# Patient Record
Sex: FEMALE | Race: WHITE | NOT HISPANIC OR LATINO | Employment: FULL TIME | URBAN - METROPOLITAN AREA
[De-identification: names, ages, dates, MRNs, and addresses within clinical notes are randomized per-mention and may not be internally consistent; named-entity substitution may affect disease eponyms.]

---

## 2023-05-30 PROBLEM — E66.811 CLASS 1 OBESITY WITH SERIOUS COMORBIDITY AND BODY MASS INDEX (BMI) OF 31.0 TO 31.9 IN ADULT: Status: ACTIVE | Noted: 2023-05-30

## 2024-08-06 ENCOUNTER — LAB VISIT (OUTPATIENT)
Dept: LAB | Facility: HOSPITAL | Age: 64
End: 2024-08-06
Attending: STUDENT IN AN ORGANIZED HEALTH CARE EDUCATION/TRAINING PROGRAM
Payer: COMMERCIAL

## 2024-08-06 ENCOUNTER — OFFICE VISIT (OUTPATIENT)
Dept: FAMILY MEDICINE | Facility: CLINIC | Age: 64
End: 2024-08-06
Payer: COMMERCIAL

## 2024-08-06 VITALS
TEMPERATURE: 97 F | WEIGHT: 209.19 LBS | OXYGEN SATURATION: 98 % | HEIGHT: 68 IN | SYSTOLIC BLOOD PRESSURE: 110 MMHG | BODY MASS INDEX: 31.71 KG/M2 | HEART RATE: 80 BPM | DIASTOLIC BLOOD PRESSURE: 72 MMHG

## 2024-08-06 DIAGNOSIS — F51.04 PSYCHOPHYSIOLOGICAL INSOMNIA: ICD-10-CM

## 2024-08-06 DIAGNOSIS — Z12.4 CERVICAL CANCER SCREENING: ICD-10-CM

## 2024-08-06 DIAGNOSIS — R07.89 ATYPICAL CHEST PAIN: ICD-10-CM

## 2024-08-06 DIAGNOSIS — Z00.00 WELLNESS EXAMINATION: Primary | ICD-10-CM

## 2024-08-06 DIAGNOSIS — M62.838 MUSCLE SPASM: ICD-10-CM

## 2024-08-06 DIAGNOSIS — I10 PRIMARY HYPERTENSION: ICD-10-CM

## 2024-08-06 DIAGNOSIS — Z00.00 WELLNESS EXAMINATION: ICD-10-CM

## 2024-08-06 DIAGNOSIS — E66.09 CLASS 1 OBESITY DUE TO EXCESS CALORIES WITH SERIOUS COMORBIDITY AND BODY MASS INDEX (BMI) OF 31.0 TO 31.9 IN ADULT: ICD-10-CM

## 2024-08-06 PROBLEM — R94.39 CARDIOVASCULAR STRESS TEST ABNORMAL: Status: RESOLVED | Noted: 2023-05-30 | Resolved: 2024-08-06

## 2024-08-06 LAB
ALBUMIN SERPL BCP-MCNC: 4.2 G/DL (ref 3.5–5.2)
ALP SERPL-CCNC: 70 U/L (ref 55–135)
ALT SERPL W/O P-5'-P-CCNC: 43 U/L (ref 10–44)
ANION GAP SERPL CALC-SCNC: 7 MMOL/L (ref 8–16)
AST SERPL-CCNC: 26 U/L (ref 10–40)
BASOPHILS # BLD AUTO: 0.03 K/UL (ref 0–0.2)
BASOPHILS NFR BLD: 0.6 % (ref 0–1.9)
BILIRUB SERPL-MCNC: 0.6 MG/DL (ref 0.1–1)
BUN SERPL-MCNC: 22 MG/DL (ref 8–23)
CALCIUM SERPL-MCNC: 10 MG/DL (ref 8.7–10.5)
CHLORIDE SERPL-SCNC: 104 MMOL/L (ref 95–110)
CHOLEST SERPL-MCNC: 188 MG/DL (ref 120–199)
CHOLEST/HDLC SERPL: 3.4 {RATIO} (ref 2–5)
CO2 SERPL-SCNC: 27 MMOL/L (ref 23–29)
CREAT SERPL-MCNC: 0.8 MG/DL (ref 0.5–1.4)
DIFFERENTIAL METHOD BLD: ABNORMAL
EOSINOPHIL # BLD AUTO: 0.1 K/UL (ref 0–0.5)
EOSINOPHIL NFR BLD: 2.1 % (ref 0–8)
ERYTHROCYTE [DISTWIDTH] IN BLOOD BY AUTOMATED COUNT: 13.4 % (ref 11.5–14.5)
EST. GFR  (NO RACE VARIABLE): >60 ML/MIN/1.73 M^2
ESTIMATED AVG GLUCOSE: 105 MG/DL (ref 68–131)
GLUCOSE SERPL-MCNC: 94 MG/DL (ref 70–110)
HBA1C MFR BLD: 5.3 % (ref 4–5.6)
HCT VFR BLD AUTO: 45.4 % (ref 37–48.5)
HCV AB SERPL QL IA: NORMAL
HDLC SERPL-MCNC: 56 MG/DL (ref 40–75)
HDLC SERPL: 29.8 % (ref 20–50)
HGB BLD-MCNC: 14.4 G/DL (ref 12–16)
HIV 1+2 AB+HIV1 P24 AG SERPL QL IA: NORMAL
IMM GRANULOCYTES # BLD AUTO: 0.01 K/UL (ref 0–0.04)
IMM GRANULOCYTES NFR BLD AUTO: 0.2 % (ref 0–0.5)
LDLC SERPL CALC-MCNC: 112.2 MG/DL (ref 63–159)
LYMPHOCYTES # BLD AUTO: 2.1 K/UL (ref 1–4.8)
LYMPHOCYTES NFR BLD: 40.2 % (ref 18–48)
MCH RBC QN AUTO: 29.8 PG (ref 27–31)
MCHC RBC AUTO-ENTMCNC: 31.7 G/DL (ref 32–36)
MCV RBC AUTO: 94 FL (ref 82–98)
MONOCYTES # BLD AUTO: 0.5 K/UL (ref 0.3–1)
MONOCYTES NFR BLD: 10.2 % (ref 4–15)
NEUTROPHILS # BLD AUTO: 2.5 K/UL (ref 1.8–7.7)
NEUTROPHILS NFR BLD: 46.7 % (ref 38–73)
NONHDLC SERPL-MCNC: 132 MG/DL
NRBC BLD-RTO: 0 /100 WBC
PLATELET # BLD AUTO: 274 K/UL (ref 150–450)
PMV BLD AUTO: 9.4 FL (ref 9.2–12.9)
POTASSIUM SERPL-SCNC: 3.9 MMOL/L (ref 3.5–5.1)
PROT SERPL-MCNC: 7.5 G/DL (ref 6–8.4)
RBC # BLD AUTO: 4.83 M/UL (ref 4–5.4)
SODIUM SERPL-SCNC: 138 MMOL/L (ref 136–145)
TRIGL SERPL-MCNC: 99 MG/DL (ref 30–150)
TSH SERPL DL<=0.005 MIU/L-ACNC: 2.85 UIU/ML (ref 0.4–4)
WBC # BLD AUTO: 5.3 K/UL (ref 3.9–12.7)

## 2024-08-06 PROCEDURE — 85025 COMPLETE CBC W/AUTO DIFF WBC: CPT | Performed by: STUDENT IN AN ORGANIZED HEALTH CARE EDUCATION/TRAINING PROGRAM

## 2024-08-06 PROCEDURE — 1160F RVW MEDS BY RX/DR IN RCRD: CPT | Mod: CPTII,S$GLB,, | Performed by: STUDENT IN AN ORGANIZED HEALTH CARE EDUCATION/TRAINING PROGRAM

## 2024-08-06 PROCEDURE — 86803 HEPATITIS C AB TEST: CPT | Performed by: STUDENT IN AN ORGANIZED HEALTH CARE EDUCATION/TRAINING PROGRAM

## 2024-08-06 PROCEDURE — 80053 COMPREHEN METABOLIC PANEL: CPT | Performed by: STUDENT IN AN ORGANIZED HEALTH CARE EDUCATION/TRAINING PROGRAM

## 2024-08-06 PROCEDURE — 83036 HEMOGLOBIN GLYCOSYLATED A1C: CPT | Performed by: STUDENT IN AN ORGANIZED HEALTH CARE EDUCATION/TRAINING PROGRAM

## 2024-08-06 PROCEDURE — 1159F MED LIST DOCD IN RCRD: CPT | Mod: CPTII,S$GLB,, | Performed by: STUDENT IN AN ORGANIZED HEALTH CARE EDUCATION/TRAINING PROGRAM

## 2024-08-06 PROCEDURE — 3074F SYST BP LT 130 MM HG: CPT | Mod: CPTII,S$GLB,, | Performed by: STUDENT IN AN ORGANIZED HEALTH CARE EDUCATION/TRAINING PROGRAM

## 2024-08-06 PROCEDURE — 3008F BODY MASS INDEX DOCD: CPT | Mod: CPTII,S$GLB,, | Performed by: STUDENT IN AN ORGANIZED HEALTH CARE EDUCATION/TRAINING PROGRAM

## 2024-08-06 PROCEDURE — 99396 PREV VISIT EST AGE 40-64: CPT | Mod: S$GLB,,, | Performed by: STUDENT IN AN ORGANIZED HEALTH CARE EDUCATION/TRAINING PROGRAM

## 2024-08-06 PROCEDURE — 87624 HPV HI-RISK TYP POOLED RSLT: CPT | Performed by: STUDENT IN AN ORGANIZED HEALTH CARE EDUCATION/TRAINING PROGRAM

## 2024-08-06 PROCEDURE — 36415 COLL VENOUS BLD VENIPUNCTURE: CPT | Mod: PO | Performed by: STUDENT IN AN ORGANIZED HEALTH CARE EDUCATION/TRAINING PROGRAM

## 2024-08-06 PROCEDURE — 3078F DIAST BP <80 MM HG: CPT | Mod: CPTII,S$GLB,, | Performed by: STUDENT IN AN ORGANIZED HEALTH CARE EDUCATION/TRAINING PROGRAM

## 2024-08-06 PROCEDURE — 84443 ASSAY THYROID STIM HORMONE: CPT | Performed by: STUDENT IN AN ORGANIZED HEALTH CARE EDUCATION/TRAINING PROGRAM

## 2024-08-06 PROCEDURE — 80061 LIPID PANEL: CPT | Performed by: STUDENT IN AN ORGANIZED HEALTH CARE EDUCATION/TRAINING PROGRAM

## 2024-08-06 PROCEDURE — 87389 HIV-1 AG W/HIV-1&-2 AB AG IA: CPT | Performed by: STUDENT IN AN ORGANIZED HEALTH CARE EDUCATION/TRAINING PROGRAM

## 2024-08-06 PROCEDURE — 88175 CYTOPATH C/V AUTO FLUID REDO: CPT | Performed by: STUDENT IN AN ORGANIZED HEALTH CARE EDUCATION/TRAINING PROGRAM

## 2024-08-06 PROCEDURE — 99999 PR PBB SHADOW E&M-EST. PATIENT-LVL IV: CPT | Mod: PBBFAC,,, | Performed by: STUDENT IN AN ORGANIZED HEALTH CARE EDUCATION/TRAINING PROGRAM

## 2024-08-06 RX ORDER — TRAZODONE HYDROCHLORIDE 50 MG/1
50 TABLET ORAL NIGHTLY
Qty: 30 TABLET | Refills: 5 | Status: SHIPPED | OUTPATIENT
Start: 2024-08-06 | End: 2024-08-06 | Stop reason: SDUPTHER

## 2024-08-06 RX ORDER — CYCLOBENZAPRINE HCL 5 MG
5 TABLET ORAL 3 TIMES DAILY PRN
Qty: 60 TABLET | Refills: 2 | Status: SHIPPED | OUTPATIENT
Start: 2024-08-06

## 2024-08-06 RX ORDER — CYCLOBENZAPRINE HCL 5 MG
5 TABLET ORAL 3 TIMES DAILY PRN
Qty: 60 TABLET | Refills: 2 | Status: SHIPPED | OUTPATIENT
Start: 2024-08-06 | End: 2024-08-06 | Stop reason: SDUPTHER

## 2024-08-06 RX ORDER — TRAZODONE HYDROCHLORIDE 50 MG/1
50 TABLET ORAL NIGHTLY
Qty: 30 TABLET | Refills: 5 | Status: SHIPPED | OUTPATIENT
Start: 2024-08-06

## 2024-08-09 LAB
FINAL PATHOLOGIC DIAGNOSIS: NORMAL
Lab: NORMAL

## 2024-08-09 PROCEDURE — 82274 ASSAY TEST FOR BLOOD FECAL: CPT | Performed by: STUDENT IN AN ORGANIZED HEALTH CARE EDUCATION/TRAINING PROGRAM

## 2024-08-13 ENCOUNTER — TELEPHONE (OUTPATIENT)
Dept: FAMILY MEDICINE | Facility: CLINIC | Age: 64
End: 2024-08-13
Payer: COMMERCIAL

## 2024-08-13 DIAGNOSIS — R19.5 POSITIVE FECAL OCCULT BLOOD TEST: Primary | ICD-10-CM

## 2024-08-16 ENCOUNTER — HOSPITAL ENCOUNTER (OUTPATIENT)
Dept: PREADMISSION TESTING | Facility: HOSPITAL | Age: 64
Discharge: HOME | End: 2024-08-16
Attending: INTERNAL MEDICINE
Payer: COMMERCIAL

## 2024-08-16 DIAGNOSIS — R19.5 POSITIVE FECAL OCCULT BLOOD TEST: Primary | ICD-10-CM

## 2024-08-16 DIAGNOSIS — R19.5 POSITIVE FECAL OCCULT BLOOD TEST: ICD-10-CM

## 2024-08-22 ENCOUNTER — HOSPITAL ENCOUNTER (OUTPATIENT)
Facility: HOSPITAL | Age: 64
Discharge: HOME | End: 2024-08-22
Attending: INTERNAL MEDICINE | Admitting: INTERNAL MEDICINE
Payer: COMMERCIAL

## 2024-08-22 ENCOUNTER — ANESTHESIA EVENT (OUTPATIENT)
Dept: ENDOSCOPY | Facility: HOSPITAL | Age: 64
End: 2024-08-22
Payer: COMMERCIAL

## 2024-08-22 ENCOUNTER — ANESTHESIA (OUTPATIENT)
Dept: ENDOSCOPY | Facility: HOSPITAL | Age: 64
End: 2024-08-22
Payer: COMMERCIAL

## 2024-08-22 DIAGNOSIS — R19.5 POSITIVE FIT (FECAL IMMUNOCHEMICAL TEST): ICD-10-CM

## 2024-08-22 PROBLEM — K64.8 INTERNAL HEMORRHOIDS: Status: ACTIVE | Noted: 2024-08-22

## 2024-08-22 PROCEDURE — 63600175 PHARM REV CODE 636 W HCPCS: Performed by: FAMILY MEDICINE

## 2024-08-22 PROCEDURE — 37000008 HC ANESTHESIA 1ST 15 MINUTES: Performed by: INTERNAL MEDICINE

## 2024-08-22 PROCEDURE — 37000009 HC ANESTHESIA EA ADD 15 MINS: Performed by: INTERNAL MEDICINE

## 2024-08-22 PROCEDURE — G0121 COLON CA SCRN NOT HI RSK IND: HCPCS | Mod: KX | Performed by: INTERNAL MEDICINE

## 2024-08-22 PROCEDURE — 25000003 PHARM REV CODE 250: Performed by: FAMILY MEDICINE

## 2024-08-22 PROCEDURE — G0121 COLON CA SCRN NOT HI RSK IND: HCPCS | Mod: KX,,, | Performed by: INTERNAL MEDICINE

## 2024-08-22 RX ORDER — LIDOCAINE HYDROCHLORIDE 20 MG/ML
INJECTION, SOLUTION EPIDURAL; INFILTRATION; INTRACAUDAL; PERINEURAL
Status: DISCONTINUED | OUTPATIENT
Start: 2024-08-22 | End: 2024-08-22

## 2024-08-22 RX ORDER — PROPOFOL 10 MG/ML
VIAL (ML) INTRAVENOUS
Status: DISCONTINUED | OUTPATIENT
Start: 2024-08-22 | End: 2024-08-22

## 2024-08-22 RX ADMIN — PROPOFOL 100 MG: 10 INJECTION, EMULSION INTRAVENOUS at 01:08

## 2024-08-22 RX ADMIN — SODIUM CHLORIDE, SODIUM LACTATE, POTASSIUM CHLORIDE, AND CALCIUM CHLORIDE: .6; .31; .03; .02 INJECTION, SOLUTION INTRAVENOUS at 01:08

## 2024-08-22 RX ADMIN — LIDOCAINE HYDROCHLORIDE 50 MG: 20 INJECTION, SOLUTION EPIDURAL; INFILTRATION; INTRACAUDAL; PERINEURAL at 01:08

## 2024-08-22 RX ADMIN — PROPOFOL 50 MG: 10 INJECTION, EMULSION INTRAVENOUS at 01:08

## 2024-08-22 NOTE — ANESTHESIA PREPROCEDURE EVALUATION
08/22/2024  Chel Thompson is a 63 y.o., female.      Pre-op Assessment    I have reviewed the Patient Summary Reports.     I have reviewed the Nursing Notes. I have reviewed the NPO Status.   I have reviewed the Medications.     Review of Systems  Anesthesia Hx:  No problems with previous Anesthesia                Hematology/Oncology:  Hematology Normal   Oncology Normal                                   EENT/Dental:  EENT/Dental Normal           Cardiovascular:     Hypertension                                        Pulmonary:  Pulmonary Normal                       Renal/:  Renal/ Normal                 Hepatic/GI:  Hepatic/GI Normal                 Musculoskeletal:  Musculoskeletal Normal                Neurological:    Neuromuscular Disease,                                   Endocrine:  Endocrine Normal          Obesity / BMI > 30  Dermatological:  Skin Normal    Psych:  Psychiatric Normal                    Physical Exam  General: Well nourished, Cooperative, Alert and Oriented    Airway:  Mallampati: II   Mouth Opening: Normal  TM Distance: Normal  Tongue: Normal  Neck ROM: Normal ROM    Dental:  Intact    Chest/Lungs:  Clear to auscultation    Heart:  Rhythm: Regular Rhythm  Sounds: Normal    Abdomen:  Normal        Anesthesia Plan  Type of Anesthesia, risks & benefits discussed:    Anesthesia Type: MAC  Intra-op Monitoring Plan: Standard ASA Monitors  Induction:  IV  Informed Consent: Informed consent signed with the Patient and all parties understand the risks and agree with anesthesia plan.  All questions answered.   ASA Score: 2  Day of Surgery Review of History & Physical: I have interviewed and examined the patient. I have reviewed the patient's H&P dated:     Ready For Surgery From Anesthesia Perspective.     .

## 2024-08-22 NOTE — PLAN OF CARE
Dr. Garcia at  to discuss findings. VSS. No  Pain, no GI bleeding. Pt to be discharged from unit.

## 2024-08-22 NOTE — H&P
PRE PROCEDURE H&P    Patient Name: Chel Thompson  MRN: [id number]  : 1960  Date of Procedure:  2024  Referring Physician: Yesica Burk MD  Primary Physician: Yesica Burk MD  Procedure Physician: Kya Garcia MD       Planned Procedure: Colonoscopy  Diagnosis: positive FIT      Chief Complaint: Same as above    HPI: Patient is an 63 y.o. female is here for the above. No previous colonoscopy. Referred for positive FIT. No ovet GI bleeding. No Fhx of CRC, no blood thinners.     Last colonoscopy: none  Family history: none  Anticoagulation: none    Past Medical History:   Past Medical History:   Diagnosis Date    Hypertension     Hypertensive heart disease without heart failure 2021    Pancreatitis     4 times (,)    UTI (urinary tract infection)         Past Surgical History:  Past Surgical History:   Procedure Laterality Date    DILATION AND CURETTAGE OF UTERUS      MULTIPLE TOOTH EXTRACTIONS      vaginal delivery      varicose veins          Home Medications:  Prior to Admission medications    Medication Sig Start Date End Date Taking? Authorizing Provider   cyclobenzaprine (FLEXERIL) 5 MG tablet Take 1 tablet (5 mg total) by mouth 3 (three) times daily as needed for Muscle spasms. 24  Yes Yesica Burk MD   traZODone (DESYREL) 50 MG tablet Take 1 tablet (50 mg total) by mouth every evening. 24  Yes Yesica Burk MD   valsartan-hydrochlorothiazide (DIOVAN-HCT) 160-12.5 mg per tablet Take 1 tablet by mouth every morning. 23  Yes Provider, Historical        Allergies:  Review of patient's allergies indicates:   Allergen Reactions    Dilaudid [hydromorphone] Itching     From IV    Nitrofurantoin Other (See Comments)     Cramping of legs        Social History:   Social History     Socioeconomic History    Marital status:      Spouse name: Shen Thompson    Number of children: 1   Tobacco Use    Smoking status: Never    Smokeless tobacco: Never   Substance and  "Sexual Activity    Alcohol use: Yes     Comment: occasionally    Drug use: No    Sexual activity: Yes     Partners: Male     Social Determinants of Health     Financial Resource Strain: Low Risk  (8/6/2024)    Overall Financial Resource Strain (CARDIA)     Difficulty of Paying Living Expenses: Not hard at all   Food Insecurity: No Food Insecurity (8/6/2024)    Hunger Vital Sign     Worried About Running Out of Food in the Last Year: Never true     Ran Out of Food in the Last Year: Never true   Transportation Needs: No Transportation Needs (5/30/2023)    PRAPARE - Transportation     Lack of Transportation (Medical): No     Lack of Transportation (Non-Medical): No   Physical Activity: Sufficiently Active (8/6/2024)    Exercise Vital Sign     Days of Exercise per Week: 5 days     Minutes of Exercise per Session: 30 min   Stress: Stress Concern Present (8/6/2024)    [place] [employer] - Occupational Stress Questionnaire     Feeling of Stress : To some extent   Housing Stability: Unknown (5/30/2023)    Housing Stability Vital Sign     Unable to Pay for Housing in the Last Year: No     Unstable Housing in the Last Year: No       Family History:  Family History   Problem Relation Name Age of Onset    Asthma Mother      Hypertension Mother      COPD Brother 3     Cancer Maternal Grandmother      Cancer Maternal Grandfather         ROS: No acute cardiac events, no acute respiratory complaints.     Physical Exam (all patients):    /89 (BP Location: Left arm, Patient Position: Lying)   Pulse 97   Temp 98.6 °F (37 °C)   Resp 19   Ht 5' 8" (1.727 m)   Wt 94.8 kg (209 lb)   LMP 05/16/2015   SpO2 96%   Breastfeeding No   BMI 31.78 kg/m²   Lungs: Clear to auscultation bilaterally, respirations unlabored  Heart: Regular rate and rhythm, S1 and S2 normal, no obvious murmurs  Abdomen:         Soft, non-tender, bowel sounds normal, no masses, no organomegaly    Lab Results   Component Value Date    " WBC 5.30 08/06/2024    MCV 94 08/06/2024    RDW 13.4 08/06/2024     08/06/2024    GLU 94 08/06/2024    HGBA1C 5.3 08/06/2024    BUN 22 08/06/2024     08/06/2024    K 3.9 08/06/2024     08/06/2024        SEDATION PLAN: per anesthesia      History reviewed, vital signs satisfactory, cardiopulmonary status satisfactory, sedation options, risks and plans have been discussed with the patient  All their questions were answered and the patient agrees to the sedation procedures as planned and the patient is deemed an appropriate candidate for the sedation as planned.    The risks, benefits and alternatives of the procedure were discussed with the patient in detail. This discussion was had in the presence of endoscopy staff. The risks include, risks of adverse reaction to sedation requiring the use of reversal agents, bleeding requiring blood transfusion, perforation requiring surgical intervention and technical failure. Other risks include aspiration leading to respiratory distress and respiratory failure resulting in endotracheal intubation and mechanical ventilation including death. If anesthesia is being utilized for this procedure, it is up to the anesthesiologist to determine airway safety including elective endotracheal intubation. Questions were answered, they agree to proceed. There was no language barriers.      Procedure explained to patient, informed consent obtained and placed in chart.    Kya Garcia  8/22/2024  1:31 PM

## 2024-08-22 NOTE — ANESTHESIA POSTPROCEDURE EVALUATION
Anesthesia Post Evaluation    Patient: Chel Thompson    Procedure(s) Performed: Procedure(s) (LRB):  COLONOSCOPY (N/A)    Final Anesthesia Type: MAC      Patient location during evaluation: GI PACU  Patient participation: Yes- Able to Participate  Level of consciousness: awake and alert  Post-procedure vital signs: reviewed and stable  Pain management: adequate  Airway patency: patent    PONV status at discharge: No PONV  Anesthetic complications: no      Cardiovascular status: stable  Respiratory status: unassisted and spontaneous ventilation  Hydration status: euvolemic  Follow-up not needed.              Vitals Value Taken Time   BP  08/22/24 1400   Temp  08/22/24 1400   Pulse  08/22/24 1400   Resp  08/22/24 1400   SpO2  08/22/24 1400         No case tracking events are documented in the log.      Pain/Meghan Score: No data recorded

## 2024-08-22 NOTE — TRANSFER OF CARE
"Anesthesia Transfer of Care Note    Patient: Chel Thompson    Procedure(s) Performed: Procedure(s) (LRB):  COLONOSCOPY (N/A)    Patient location: GI    Anesthesia Type: MAC    Transport from OR: Transported from OR on room air with adequate spontaneous ventilation    Post pain: adequate analgesia    Post assessment: no apparent anesthetic complications    Post vital signs: stable    Level of consciousness: awake and responds to stimulation    Nausea/Vomiting: no nausea/vomiting    Complications: none    Transfer of care protocol was followed      Last vitals: Visit Vitals  /89 (BP Location: Left arm, Patient Position: Lying)   Pulse 97   Temp 37 °C (98.6 °F)   Resp 19   Ht 5' 8" (1.727 m)   Wt 94.8 kg (209 lb)   LMP 05/16/2015   SpO2 96%   Breastfeeding No   BMI 31.78 kg/m²     "

## 2024-08-22 NOTE — PR[employer]ATION PATIENT INSTRUCTIONS
Discharge Summary/Instructions after an Endoscopic Procedure  Patient Name: Chel Thompson  Patient MRN: [id number]  Patient YOB: 1960 Thursday, August 22, 2024 Kya Garcia MD  Dear patient,  As a result of recent federal legislation (The Federal Cures Act), you may   receive lab or pathology results from your procedure in your [facility]   account before your physician is able to contact you. Your physician or   their representative will relay the results to you with their   recommendations at their soonest availability.  Thank you,  RESTRICTIONS:  During your procedure today, you received medications for sedation.  These   medications may affect your judgment, balance and coordination.  Therefore,   for 24 hours, you have the following restrictions:   - DO NOT drive a car, operate machinery, make legal/financial decisions,   sign important papers or drink alcohol.    ACTIVITY:  Today: no heavy lifting, straining or running due to procedural   sedation/anesthesia.  The following day: return to full activity including work.  DIET:  Eat and drink normally unless instructed otherwise.     TREATMENT FOR COMMON SIDE EFFECTS:  - Mild abdominal pain, nausea, belching, bloating or excessive gas:  rest,   eat lightly and use a heating pad.  - Sore Throat: treat with throat lozenges and/or gargle with warm salt   water.  - Because air was used during the procedure, expelling large amounts of air   from your rectum or belching is normal.  - If a bowel prep was taken, you may not have a bowel movement for 1-3 days.    This is normal.  SYMPTOMS TO WATCH FOR AND REPORT TO YOUR PHYSICIAN:  1. Abdominal pain or bloating, other than gas cramps.  2. Chest pain.  3. Back pain.  4. Signs of infection such as: chills or fever occurring within 24 hours   after the procedure.  5. Rectal bleeding, which would show as bright red, maroon, or black stools.   (A tablespoon of blood from the rectum is not serious, especially if    hemorrhoids are present.)  6. Vomiting.  7. Weakness or dizziness.  GO DIRECTLY TO THE NEAREST EMERGENCY ROOM IF YOU HAVE ANY OF THE FOLLOWING:      Difficulty breathing              Chills and/or fever over 101 F   Persistent vomiting and/or vomiting blood   Severe abdominal pain   Severe chest pain   Black, tarry stools   Bleeding- more than one tablespoon   Any other symptom or condition that you feel may need urgent attention  Your doctor recommends these additional instructions:  If any biopsies were taken, your doctors clinic will contact you in 1 to 2   weeks with any results.  - Discharge patient to home (with escort).   - Resume previous diet.   - Continue present medications.   - Repeat colonoscopy in 10 years for screening purposes.   - Return to primary care physician.  For questions, problems or results please call your physician Kya Garcia MD at Work:  [phone]  If you have any questions about the above instructions, call the GI   department at [phone] or call the endoscopy unit at [phone]   from 7am until 3 pm.  [facility] [place], EMERGENCY ROOM PHONE NUMBER:   [phone]  IF A COMPLICATION OR EMERGENCY SITUATION ARISES AND YOU ARE UNABLE TO REACH   YOUR PHYSICIAN - GO DIRECTLY TO THE EMERGENCY ROOM.  I have read or have had read to me these discharge instructions for my   procedure and have received a written copy.  I understand these   instructions and will follow-up with my physician if I have any questions.     __________________________________       _____________________________________  Nurse Signature                                          Patient/Designated   Responsible Party Signature  MD Kya Lanier MD  8/22/2024 1:59:42 PM  This report has been verified and signed electronically.  Dear patient,  As a result of recent federal legislation (The Federal Cures Act), you may   receive lab or pathology results from your  procedure in your [facility]   account before your physician is able to contact you. Your physician or   their representative will relay the results to you with their   recommendations at their soonest availability.  Thank you,  [employer]ATION

## 2024-08-23 VITALS
DIASTOLIC BLOOD PRESSURE: 65 MMHG | WEIGHT: 209 LBS | TEMPERATURE: 98 F | RESPIRATION RATE: 18 BRPM | HEIGHT: 68 IN | HEART RATE: 70 BPM | SYSTOLIC BLOOD PRESSURE: 116 MMHG | OXYGEN SATURATION: 97 % | BODY MASS INDEX: 31.67 KG/M2

## 2024-09-12 ENCOUNTER — PATIENT MESSAGE (OUTPATIENT)
Dept: FAMILY MEDICINE | Facility: CLINIC | Age: 64
End: 2024-09-12
Payer: COMMERCIAL

## 2024-09-13 ENCOUNTER — TELEPHONE (OUTPATIENT)
Dept: FAMILY MEDICINE | Facility: CLINIC | Age: 64
End: 2024-09-13
Payer: COMMERCIAL

## 2024-09-13 ENCOUNTER — OFFICE VISIT (OUTPATIENT)
Dept: URGENT CARE | Facility: CLINIC | Age: 64
End: 2024-09-13
Payer: COMMERCIAL

## 2024-09-13 VITALS
RESPIRATION RATE: 20 BRPM | BODY MASS INDEX: 31.67 KG/M2 | OXYGEN SATURATION: 98 % | HEART RATE: 83 BPM | DIASTOLIC BLOOD PRESSURE: 86 MMHG | SYSTOLIC BLOOD PRESSURE: 136 MMHG | HEIGHT: 68 IN | WEIGHT: 209 LBS | TEMPERATURE: 98 F

## 2024-09-13 DIAGNOSIS — M54.41 ACUTE RIGHT-SIDED LOW BACK PAIN WITH RIGHT-SIDED SCIATICA: Primary | ICD-10-CM

## 2024-09-13 RX ORDER — METHYLPREDNISOLONE 4 MG/1
TABLET ORAL
Qty: 1 EACH | Refills: 0 | Status: SHIPPED | OUTPATIENT
Start: 2024-09-13

## 2024-09-13 RX ORDER — MELOXICAM 15 MG/1
15 TABLET ORAL DAILY
Qty: 30 TABLET | Refills: 0 | Status: SHIPPED | OUTPATIENT
Start: 2024-09-13 | End: 2024-10-13

## 2024-09-13 RX ORDER — KETOROLAC TROMETHAMINE 30 MG/ML
30 INJECTION, SOLUTION INTRAMUSCULAR; INTRAVENOUS
Status: COMPLETED | OUTPATIENT
Start: 2024-09-13 | End: 2024-09-13

## 2024-09-13 RX ADMIN — KETOROLAC TROMETHAMINE 30 MG: 30 INJECTION, SOLUTION INTRAMUSCULAR; INTRAVENOUS at 12:09

## 2024-09-13 NOTE — PATIENT INSTRUCTIONS
Referred to back and spine clinic for follow up. If you have not heard back within 2-3 business days, call  to check on status of your referral.    You were given TORADOL injection here. You can start MELOXICAM tomorrow. Do not combine with other NSAIDS (ibuprofen, aleve, naproxen). You can start MEDROL dose fidel (steroid) today. Closely monitor BP while taking as it can elevate. Discontinue if blood pressure consistently > 160/90, or severe side effects - may cause insomnia, anxiety, palpitations and fluid retention.      You need more Urgent re-evaluation in the ER if severe pain, weakness in legs, trauma to back, bowel/bladder incontinence, saddle area numbness, associated abdominal or chest pain, or shortness of breath.

## 2024-09-13 NOTE — TELEPHONE ENCOUNTER
Spoke with pt in regards to making a same day appointment. Pt informed that Dr. Ocasio is book and Dr. Burk is on Maturity leave. Pt said she will go to urgent care.    16 transfusions between DIC and heart surgery/none

## 2024-09-13 NOTE — TELEPHONE ENCOUNTER
----- Message from Gopal Delacruz sent at 9/13/2024  8:07 AM CDT -----  Contact: [phone]  Type:  Same Day Appointment Request    Caller is requesting a same day appointment.  Caller declined first available appointment listed below.    Name of Caller:ADRIA KIRK [[id number]]  When is the first available appointment?need to be seen today....nothing populated in the system  Symptoms:sciatic nerve  Best Call Back Number: [phone]  Additional Information:mrn  [id number]... DR LAMBERT

## 2024-09-19 ENCOUNTER — HOSPITAL ENCOUNTER (OUTPATIENT)
Dept: RADIOLOGY | Facility: HOSPITAL | Age: 64
Discharge: HOME | End: 2024-09-19
Attending: FAMILY MEDICINE
Payer: COMMERCIAL

## 2024-09-19 DIAGNOSIS — Z12.31 BREAST CANCER SCREENING BY MAMMOGRAM: ICD-10-CM

## 2024-09-19 PROCEDURE — 77067 SCR MAMMO BI INCL CAD: CPT | Mod: 26,,, | Performed by: RADIOLOGY

## 2024-09-19 PROCEDURE — 77067 SCR MAMMO BI INCL CAD: CPT | Mod: TC

## 2024-09-19 PROCEDURE — 77063 BREAST TOMOSYNTHESIS BI: CPT | Mod: 26,,, | Performed by: RADIOLOGY

## 2024-09-24 ENCOUNTER — OFFICE VISIT (OUTPATIENT)
Dept: PAIN MEDICINE | Facility: CLINIC | Age: 64
End: 2024-09-24
Payer: COMMERCIAL

## 2024-09-24 ENCOUNTER — HOSPITAL ENCOUNTER (OUTPATIENT)
Dept: RADIOLOGY | Facility: HOSPITAL | Age: 64
Discharge: HOME | End: 2024-09-24
Attending: PHYSICIAN ASSISTANT
Payer: COMMERCIAL

## 2024-09-24 VITALS
BODY MASS INDEX: 32.16 KG/M2 | SYSTOLIC BLOOD PRESSURE: 129 MMHG | WEIGHT: 211.56 LBS | DIASTOLIC BLOOD PRESSURE: 78 MMHG | HEART RATE: 79 BPM

## 2024-09-24 DIAGNOSIS — M54.16 LUMBAR RADICULOPATHY: ICD-10-CM

## 2024-09-24 DIAGNOSIS — M54.50 LUMBAR BACK PAIN: ICD-10-CM

## 2024-09-24 DIAGNOSIS — M54.50 LUMBAR BACK PAIN: Primary | ICD-10-CM

## 2024-09-24 PROCEDURE — 72114 X-RAY EXAM L-S SPINE BENDING: CPT | Mod: TC,PO

## 2024-09-24 PROCEDURE — 3044F HG A1C LEVEL LT 7.0%: CPT | Mod: CPTII,S$GLB,, | Performed by: PHYSICIAN ASSISTANT

## 2024-09-24 PROCEDURE — 99203 OFFICE O/P NEW LOW 30 MIN: CPT | Mod: S$GLB,,, | Performed by: PHYSICIAN ASSISTANT

## 2024-09-24 PROCEDURE — 1160F RVW MEDS BY RX/DR IN RCRD: CPT | Mod: CPTII,S$GLB,, | Performed by: PHYSICIAN ASSISTANT

## 2024-09-24 PROCEDURE — 3074F SYST BP LT 130 MM HG: CPT | Mod: CPTII,S$GLB,, | Performed by: PHYSICIAN ASSISTANT

## 2024-09-24 PROCEDURE — 99999 PR PBB SHADOW E&M-EST. PATIENT-LVL IV: CPT | Mod: PBBFAC,,, | Performed by: PHYSICIAN ASSISTANT

## 2024-09-24 PROCEDURE — 72114 X-RAY EXAM L-S SPINE BENDING: CPT | Mod: 26,,, | Performed by: RADIOLOGY

## 2024-09-24 PROCEDURE — 3008F BODY MASS INDEX DOCD: CPT | Mod: CPTII,S$GLB,, | Performed by: PHYSICIAN ASSISTANT

## 2024-09-24 PROCEDURE — 3078F DIAST BP <80 MM HG: CPT | Mod: CPTII,S$GLB,, | Performed by: PHYSICIAN ASSISTANT

## 2024-09-24 PROCEDURE — 1159F MED LIST DOCD IN RCRD: CPT | Mod: CPTII,S$GLB,, | Performed by: PHYSICIAN ASSISTANT

## 2024-09-24 NOTE — PROGRESS NOTES
[facility] New Patient Evaluation      Referred by: Candace Mohamud    PCP:  Yesica Burk MD    CC:   Chief Complaint   Patient presents with    Back Pain          HPI:   Chel Thompson is a 63 y.o. year old female patient who has a past medical history of Hypertension, Hypertensive heart disease without heart failure, Pancreatitis, and UTI (urinary tract infection). She presents in referral from Candace Mohamud for lower back pain.  She has had pain for approximately 1 year.  Pain is increased over the last few weeks.  Pain is felt in the right lower back with radiation to the right posterolateral thigh stopping at the knee.  A few nights ago she also experienced left buttock and superior thigh pain.  She describes pain as burning, numb, sharp, stabbing.  Pain increases at night, while walking, while standing.  Pain improves with rest and elevating the legs.  She was seen in urgent care 09/13/2024 at which time she was treated with IM Toradol and given prescriptions for Mobic and Medrol.  She has completed the steroid without improvement.  She has not had any other treatments.    Denies bowel/ bladder incontinence.    Past and current medications:  Antineuropathics:  NSAIDs: aleve, meloxicam  Antidepressants:  Muscle relaxers:  Opioids:  Antiplatelets/Anticoagulants:  Others:  tried and completed medrol taper    Physical Therapy/ Chiropractic care:  none    Pain Intervention History:  none    Past Spine Surgical History:  none        History:    Current Outpatient Medications:     cyclobenzaprine (FLEXERIL) 5 MG tablet, Take 1 tablet (5 mg total) by mouth 3 (three) times daily as needed for Muscle spasms., Disp: 60 tablet, Rfl: 2    meloxicam (MOBIC) 15 MG tablet, Take 1 tablet (15 mg total) by mouth once daily., Disp: 30 tablet, Rfl: 0    methylPREDNISolone (MEDROL DOSEPACK) 4 mg tablet, use as directed, Disp: 1 each, Rfl: 0    traZODone (DESYREL) 50 MG tablet, Take 1 tablet (50  mg total) by mouth every evening., Disp: 30 tablet, Rfl: 5    valsartan-hydrochlorothiazide (DIOVAN-HCT) 160-12.5 mg per tablet, Take 1 tablet by mouth every morning., Disp: , Rfl:     Past Medical History:   Diagnosis Date    Hypertension     Hypertensive heart disease without heart failure 02/04/2021    Pancreatitis     4 times (2009,2013)    UTI (urinary tract infection)        Past Surgical History:   Procedure Laterality Date    COLONOSCOPY N/A 8/22/2024    Procedure: COLONOSCOPY;  Surgeon: Kya Garcia MD;  Location: [facility];  Service: Endoscopy;  Laterality: N/A;    DILATION AND CURETTAGE OF UTERUS      MULTIPLE TOOTH EXTRACTIONS      vaginal delivery      varicose veins         Family History   Problem Relation Name Age of Onset    Asthma Mother      Hypertension Mother      COPD Brother 3     Cancer Maternal Grandmother      Cancer Maternal Grandfather         Social History     Socioeconomic History    Marital status:      Spouse name: Shen Thompson    Number of children: 1   Tobacco Use    Smoking status: Never    Smokeless tobacco: Never   Substance and Sexual Activity    Alcohol use: Yes     Comment: occasionally    Drug use: No    Sexual activity: Yes     Partners: Male     Social Determinants of Health     Financial Resource Strain: Low Risk  (8/6/2024)    Overall Financial Resource Strain (CARDIA)     Difficulty of Paying Living Expenses: Not hard at all   Food Insecurity: No Food Insecurity (8/6/2024)    Hunger Vital Sign     Worried About Running Out of Food in the Last Year: Never true     Ran Out of Food in the Last Year: Never true   Transportation Needs: No Transportation Needs (5/30/2023)    PRAPARE - Transportation     Lack of Transportation (Medical): No     Lack of Transportation (Non-Medical): No   Physical Activity: Sufficiently Active (8/6/2024)    Exercise Vital Sign     Days of Exercise per Week: 5 days     Minutes of Exercise per Session: 30 min   Stress: Stress Concern  Present (8/6/2024)    Citizen of [place] [employer] - Occupational Stress Questionnaire     Feeling of Stress : To some extent   Housing Stability: Unknown (5/30/2023)    Housing Stability Vital Sign     Unable to Pay for Housing in the Last Year: No     Unstable Housing in the Last Year: No       Review of patient's allergies indicates:   Allergen Reactions    Dilaudid [hydromorphone] Itching     From IV    Nitrofurantoin Other (See Comments)     Cramping of legs       Labs:  Lab Results   Component Value Date    HGBA1C 5.3 08/06/2024       Lab Results   Component Value Date    WBC 5.30 08/06/2024    HGB 14.4 08/06/2024    HCT 45.4 08/06/2024    MCV 94 08/06/2024     08/06/2024           Review of Systems:  Low back pain.  Right thigh pain..  Balance of review of systems is negative.    Physical Exam:  Vitals:    09/24/24 1311   BP: 129/78   Pulse: 79   Weight: 95.9 kg (211 lb 8.5 oz)   PainSc:   5   PainLoc: Back     Body mass index is 32.16 kg/m².    Pain disability index:      9/24/2024     1:10 PM   Last 3 PDI Scores   Pain Disability Index (PDI) 48       Gen: NAD  Psych: mood appropriate for given condition  HEENT: eyes anicteric   CV: RRR, 2+ radial pulse  Respiratory: non-labored, no signs of respiratory distress  Abd: non-distended  Skin: warm, dry and intact.  Gait: Able to heel walk, toe walk. No antalgic gait.     Coordination:   Tandem walking coordination: normal    Cervical spine: ROM is full in flexion, extension and lateral rotation without increased pain.  Spurling's maneuver causes no neck pain to either side.  Myofascial exam: No Tenderness to palpation across cervical paraspinous region bilaterally.    Lumbar spine:  Lumbar spine: ROM is full with flexion extension and oblique extension with no increased pain.    Riley's test causes no increased pain on either side.    Supine straight leg raise is negative bilaterally.    Internal and external rotation of the hip causes no  increased pain on either side.  Myofascial exam: No tenderness to palpation across lumbar paraspinous muscles. No tenderness to palpation over the bilateral greater trochanters and bilateral SI joint    Sensory:  Intact and symmetrical to light touch in C4-T1 dermatomes bilaterally. Intact and symmetrical to light touch in L1-S1 dermatomes bilaterally.    Motor:    Right Left   C4 Shoulder Abduction  5  5   C5 Elbow Flexion    5  5   C6 Wrist Extension  5  5   C7 Elbow Extension   5  5   C8/T1 Hand Intrinsics   5  5        Right Left   L2/3 Iliacus Hip flexion  5  5   L3/4 Qudratus Femoris Knee Extension  5  5   L4/5 Tib Anterior Ankle Dorsiflexion   5  5   L5/S1 Extensor Hallicus Longus Great toe extension  5  5   S1/S2 Gastroc/Soleus Plantar Flexion  5  5      Right Left   Triceps DTR 2+ 2+   Biceps DTR 2+ 2+   Brachioradialis DTR 2+ 2+   Patellar DTR 2+ 2+   Achilles DTR 2+ 2+   Marroquin Absent  Absent   Clonus Absent Absent   Babinski Absent Absent       Imaging:  No spine imaging.       Assessment:   Chel Thompson is a 63 y.o. year old female patient who has a past medical history of Hypertension, Hypertensive heart disease without heart failure, Pancreatitis, and UTI (urinary tract infection). She presents in referral from Candace Mohamud for lower back and right thigh pain.    Right lower back and right posterior lateral thigh pain.  Differential includes myofascial/mechanical pain, versus early lumbar radiculopathy due to degenerative changes and/or disc hernia.  No focal neurological deficits.    Plan:  - to help with pain, we discussed role of home exercise, physical therapy, further medications, and obtaining imaging.  - she will try physical therapy.  - order placed for x-ray lumbar spine.  If no improvement with physical therapy or if indicated by x-ray, we will proceed with MRI to further assess    Problem List Items Addressed This Visit    None  Visit Diagnoses       Lumbar back pain     -  Primary    Relevant Orders    X-Ray Lumbar Complete Including Flex And Ext    Ambulatory referral/consult to Physical/Occupational Therapy    Lumbar radiculopathy        Relevant Orders    X-Ray Lumbar Complete Including Flex And Ext    Ambulatory referral/consult to Physical/Occupational Therapy            Follow Up: RTC in 6 weeks to monitor progress with PT    : Reviewed and consistent with medication use as prescribed.    Thank you for referring this interesting patient, and I look forward to continuing to collaborate in her care.        Shannan Tellez PA-C  [facility]

## 2024-09-27 ENCOUNTER — TELEPHONE (OUTPATIENT)
Dept: PAIN MEDICINE | Facility: CLINIC | Age: 64
End: 2024-09-27
Payer: COMMERCIAL

## 2024-09-27 NOTE — TELEPHONE ENCOUNTER
----- Message from Shannan Tellez PA-C sent at 9/26/2024  3:57 PM CDT -----  Results Reviewed.  Please call with below:    Xray of the lower back reviewed.  There is good alignment of the spine with no instability of the lower back spine bones.  There are some age appropriate degenerative changes throughout the lower back pain.  If PT worsens pain or does not help, we can get an MRI for further evaluate.

## 2024-09-30 ENCOUNTER — CLINICAL SUPPORT (OUTPATIENT)
Dept: REHABILITATION | Facility: HOSPITAL | Age: 64
End: 2024-09-30
Payer: COMMERCIAL

## 2024-09-30 DIAGNOSIS — R53.1 WEAKNESS: Primary | ICD-10-CM

## 2024-09-30 DIAGNOSIS — M54.50 LUMBAR BACK PAIN: ICD-10-CM

## 2024-09-30 DIAGNOSIS — M54.16 LUMBAR RADICULOPATHY: ICD-10-CM

## 2024-09-30 DIAGNOSIS — M25.69 DECREASED RANGE OF MOTION OF TRUNK AND BACK: ICD-10-CM

## 2024-09-30 PROCEDURE — 97530 THERAPEUTIC ACTIVITIES: CPT | Mod: PN

## 2024-09-30 PROCEDURE — 97161 PT EVAL LOW COMPLEX 20 MIN: CPT | Mod: PN

## 2024-09-30 NOTE — PLAN OF CARE
"[facility]   Physical Therapy Initial Evaluation      Name: Chel Thomas [place]  Clinic Number: [id number]    Therapy Diagnosis:   Encounter Diagnoses   Name Primary?    Lumbar back pain     Lumbar radiculopathy     Weakness Yes    Decreased range of motion of trunk and back         Physician: Shannan Tellez PA-C    Physician Orders: PT Eval and Treat   Medical Diagnosis from Referral:  Lumbar back pain [M54.50], Lumbar radiculopathy [M54.16]   Evaluation Date: 9/30/2024  Authorization Period Expiration: 12/31/2024  Plan of Care Expiration: 11/11/2024  Progress Note Due: 10/30/2024  Visit # / Visits authorized: 1/ 1   FOTO: 1/3    Precautions: Standard     Time In: 8:00 am   Time Out: 8:45 am   Total Appointment Time (timed & untimed codes): 45 minutes    Subjective     Date of onset: about a year ago    History of current condition - Chel reports: bilateral low back and R lateral thigh pain. Pt notes pain started about a year ago without known cause, as mainly right-sided hip and thigh pain. Pt reportedly works long hours on her feet daily and has been for some time; she also notes she has gained weight in the last year which may relate to pain onset. Patient states more recently pain is on both sides of the back and into the R thigh. Pt reports numbness and tingling type feelings are also present in the R lateral thigh and worsen significantly at night. Pt states pain is now affecting her sleep.     Imaging: Lumbar x-rays 9/24/2024: "Multilevel mild degenerative changes of the thoracolumbar spine without evidence of an acute abnormality."    Prior Therapy: none  Social History: lives with their spouse  Occupation: works the floor at [employer]   Prior Level of Function: Independent and pain free with all activities of daily living   Current Level of Function: Independent though pain limited with tolerance to work-related and household activities of daily living,as well as " driving    Pain:  Current 6/10, worst 9/10, best 6/10   Location: B low back and R thigh    Description: Aching and Sharp  Aggravating Factors: Standing, Bending, Walking, Night Time, Lifting, Getting out of bed/chair, and stairs/ladders   Easing Factors:  OTC medication     Patients goals: to decrease pain levels     Medical History:   Past Medical History:   Diagnosis Date    Hypertension     Hypertensive heart disease without heart failure 02/04/2021    Pancreatitis     4 times (2009,2013)    UTI (urinary tract infection)        Surgical History:   Chel Thompson  has a past surgical history that includes varicose veins; Multiple tooth extractions; vaginal delivery; Dilation and curettage of uterus; and Colonoscopy (N/A, 8/22/2024).    Medications:   Chel has a current medication list which includes the following prescription(s): cyclobenzaprine, meloxicam, methylprednisolone, trazodone, and valsartan-hydrochlorothiazide.    Allergies:   Review of patient's allergies indicates:   Allergen Reactions    Dilaudid [hydromorphone] Itching     From IV    Nitrofurantoin Other (See Comments)     Cramping of legs        Objective      RANGE OF MOTION:    Lumbar AROM   (% of norm ROM present)  Right  (spine) Left   Pain/Dysfunction with Movement Goal   Lumbar Flexion  75% --- Pain reproduction of R side 100%   Lumbar Extension  75% --- Pain reproduction in R side 100%   Lumbar Side Bending  75% 75% Pulling in L side with RSB,pain increase in R thigh with L SB Min to no pain B    Lumbar Rotation 75% 75% Relieving pop on R side with L rotation ---     STRENGTH:    L/E MMT Right Left Pain/Dysfunction with Movement Goal   Hip Flexion  4/5 4/5 --- 5/5 B   Hip Extension  4-/5 4-/5 Pain reproduction B  4+/5 B   Hip Abduction  4-/5 4-/5 Pain reproduction B  4+/5 B   Knee Extension 4+/5 4+/5 --- ---   Knee Flexion 4/5 4/5 Pain reproduction in lateral thighs B 5/5 B   Hip IR 4+/5 4+/5 --- ---   Hip ER 4/5 4/5 --- 5/5  B     MUSCLE LENGTH:     Muscle Tested  Right Left    Hamstrings  decreased decreased   Piriformis  decreased decreased     SPECIAL TESTS:     Right  (spine) Left    SLR Test Positive Negative     Palpation: Increased tone and tenderness noted with palpation of right PSIS, B glutes, hamstrings, and upper lumbar spinous processes    Posture:  Pt presents with postural abnormalities which include: forward head, rounded shoulders , and increased lumbar lordosis    Gait Analysis: The patient ambulated with the following gait abnormalities:  antalgic     FOTO Lumbar Survey    Therapist reviewed FOTO scores for Chel Thompson on 9/30/2024.   FOTO documents entered into [facility] - see Media section.    Score: 63       Treatment     Total Treatment time (time-based codes) separate from Evaluation: 15 minutes     Chel received the treatments listed below:        therapeutic activities to improve functional performance for 15  minutes, including:    HEP and plan of care education     Patient Education and Home Exercises     Education provided:   - justification and explanation of HEP and treatment   - plan of care   - anatomical and biomechanical mechanisms in involved regions     Written Home Exercises Provided: yes. Exercises were reviewed and Chel was able to demonstrate them prior to the end of the session.  Chel demonstrated good  understanding of the education provided. See EMR under Patient Instructions for exercises provided during therapy sessions.    Assessment     Chel is a 63 y.o. female referred to outpatient Physical Therapy with a medical diagnosis of lumbar back pain and lumbar radiculopathy. Patient presents with deficits in strength, range of motion, posture, flexibility, and gait. Pt is being limited with her tolerance to demands of her daily tasks with work and around home.     Patient prognosis is Good.   Patient will benefit from skilled outpatient Physical Therapy to address the deficits  stated above and in the chart below, provide patient /family education, and to maximize patientt's level of independence.     Plan of care discussed with patient: Yes  Patient's spiritual, cultural and educational needs considered and patient is agreeable to the plan of care and goals as stated below:     Anticipated Barriers for therapy: chronicity of condition     Medical Necessity is demonstrated by the following  History  Co-morbidities and personal factors that may impact the plan of care [x] LOW: no personal factors / co-morbidities  [] MODERATE: 1-2 personal factors / co-morbidities  [] HIGH: 3+ personal factors / co-morbidities    Moderate / High Support Documentation: n/a     Examination  Body Structures and Functions, activity limitations and participation restrictions that may impact the plan of care [x] LOW: addressing 1-2 elements  [] MODERATE: 3+ elements  [] HIGH: 4+ elements (please support below)    Moderate / High Support Documentation:   [] Head / Neck  [] Spine  [] Upper Quarter  [] Lower Quarter  [] Range of motion Deficits  [] Gross Symmetry Deficits  [] Strength Deficits  [] Balance Deficits  [] Gait Deficits  [] Unable to participate in daily activities  [] Unable to perform functional tasks  [] Unable to Care for Self or others  [] Community/ Social Life changes due to impairments     Clinical Presentation [x] LOW: stable  [] MODERATE: Evolving  [] HIGH: Unstable     Decision Making/ Complexity Score: low       Goals:    Short Term Goals:  3 weeks Progress   9/30/2024   Pain: Pt will demonstrate improved pain by reports of less than or equal to 6/10 worst pain on the verbal rating scale in order to progress toward maximal functional ability and improve QOL. PC   HEP: Patient will demonstrate independence with HEP in order to progress toward functional independence. PC     Long Term Goals:  6 weeks Progress  9/30/2024   Pain: Pt will demonstrate improved pain by reports of less than or equal  to 3/10 worst pain on the verbal rating scale in order to progress toward maximal functional ability and improve QOL.   PC   Function: Patient will demonstrate improved function as indicated by a functional score of greater than or equal to 75 out of 100 on FOTO. PC   Mobility: Patient will improve AROM to stated goals, listed in objective measures above, in order to return to maximal functional potential and improve quality of life. PC   Strength: Patient will improve strength to stated goals, listed in objective measures above, in order to improve functional independence and quality of life. PC   GOALS KEY:   PC= progressing/continue; PM= partially met;        DC= discontinue  Plan     Plan of care Certification: 9/30/2024 to 11/11/2024.    Outpatient Physical Therapy 2 times weekly for 6 weeks to include the following interventions: Cervical/Lumbar Traction, Electrical Stimulation with or without FDN, Gait Training, Manual Therapy, Moist Heat/ Ice, Neuromuscular Re-ed, Orthotic Management and Training, Patient Education, Self Care, Therapeutic Activities, Therapeutic Exercise, and Dry Needling .     Lainey Alonso, PT

## 2024-10-02 ENCOUNTER — CLINICAL SUPPORT (OUTPATIENT)
Dept: REHABILITATION | Facility: HOSPITAL | Age: 64
End: 2024-10-02
Payer: COMMERCIAL

## 2024-10-02 DIAGNOSIS — M25.69 DECREASED RANGE OF MOTION OF TRUNK AND BACK: ICD-10-CM

## 2024-10-02 DIAGNOSIS — R53.1 WEAKNESS: Primary | ICD-10-CM

## 2024-10-02 PROCEDURE — 97140 MANUAL THERAPY 1/> REGIONS: CPT | Mod: PN

## 2024-10-02 PROCEDURE — 97110 THERAPEUTIC EXERCISES: CPT | Mod: PN

## 2024-10-02 PROCEDURE — 97112 NEUROMUSCULAR REEDUCATION: CPT | Mod: PN

## 2024-10-02 NOTE — PROGRESS NOTES
"[facility]   Physical Therapy Treatment Note      Name: Chel Thomas [place]  Clinic Number: [id number]    Therapy Diagnosis:   Encounter Diagnoses   Name Primary?    Weakness Yes    Decreased range of motion of trunk and back      Physician: Shannan Tellez PA-C    Visit Date: 10/2/2024    Physician Orders: PT Eval and Treat   Medical Diagnosis from Referral:  Lumbar back pain [M54.50], Lumbar radiculopathy [M54.16]   Evaluation Date: 9/30/2024  Authorization Period Expiration: 12/31/2024  Plan of Care Expiration: 11/11/2024  Progress Note Due: 10/30/2024  Visit # / Visits authorized: 1/ 20  FOTO: 1/3     Precautions: Standard     PTA Visit #: -/5     Time In: 9:00 am   Time Out: 9:56 am   Total Billable Time: 56 minutes    Subjective     Pt reports: she is doing a little better feeling like the home exercises as starting to help.  She was compliant with home exercise program.  Response to previous treatment: n/a today  Functional change: n/a today     Pain: 5/10  Location: R hip/ low back       Objective      Objective Measures updated at progress report unless specified.     Treatment     Chel received the following treatments:    CPT Intervention  JointFocus Duration / Intensity  10/2/2024   TE Nustep  6 minutes    TE LTR X 10 5" holds B   TE Hamstring stretch long sit   5x 10" holds    TE  Piriformis stretch   5 x 10" holds    NMR  TA activation with diaphragmatic breathing   3 minutes    NMR  PPT  2 x 10     TE  DKTC c swiss ball   2 x 10    NMR  Bridges over swiss ball  X 10     NMR SL clamshells  X 10 5" holds    NMR SLR  2 x 10    TE Cat/ cow  X 8   NMR Mod plank to prayer stretch  5x   MT Release techniques to B glute medius and piriformis   Long axis distraction B LE  8'   PLAN         CPT Codes available for Billing:   (8) minutes of Manual therapy (MT) to improve pain and ROM.  (24) minutes of Therapeutic Exercise (TE) to develop strength, endurance, range of motion, " and flexibility.  (24) minutes of Neuromuscular Re-Education (NMR)  to improve: Balance, Coordination, Kinesthetic, Sense, Proprioception, and Posture.  (-) minutes of Therapeutic Activities (TA) to improve functional performance.  (-) minutes of Gait Training (GT) to improve functional mobility and safety  Unattended Electrical Stimulation (ES) for muscle performance or pain modulation.  Vasopneumatic Device Therapy () for management of swelling/edema. (51504)  BFR: Blood flow restriction applied during exercise  NP or (-): Not Performed  Patient Education and Home Exercises       Education provided:   - explanation and justification of treatments performed     Written Home Exercises Provided: Patient instructed to cont prior HEP. Exercises were reviewed and Chel was able to demonstrate them prior to the end of the session.  Chel demonstrated good  understanding of the education provided. See EMR under Patient Instructions for exercises provided during therapy sessions    Assessment   Treatment started focusing on neuromotor control redevelopment at trunk and lower extremities, along with stretching/ mobility work for the lower back and hips. Pt tolerates this well though she does note more symptoms felt in the L hip with activities, despite symptoms being more right sided normally. Manual was done at end of session to further address tissue tension that remains noteable at the hips, especially. Will continue to progress per tolerance.     Chel Is progressing well towards her goals.   Pt prognosis is Good.     Pt will continue to benefit from skilled outpatient physical therapy to address the deficits listed in the problem list box on initial evaluation, provide pt/family education and to maximize pt's level of independence in the home and community environment.     Pt's spiritual, cultural and educational needs considered and pt agreeable to plan of care and goals.     Anticipated barriers to physical  therapy:  chronicity of condition     Goals:   Short Term Goals:  3 weeks Progress   9/30/2024   Pain: Pt will demonstrate improved pain by reports of less than or equal to 6/10 worst pain on the verbal rating scale in order to progress toward maximal functional ability and improve QOL. PC   HEP: Patient will demonstrate independence with HEP in order to progress toward functional independence. PC      Long Term Goals:  6 weeks Progress  9/30/2024   Pain: Pt will demonstrate improved pain by reports of less than or equal to 3/10 worst pain on the verbal rating scale in order to progress toward maximal functional ability and improve QOL.   PC   Function: Patient will demonstrate improved function as indicated by a functional score of greater than or equal to 75 out of 100 on FOTO. PC   Mobility: Patient will improve AROM to stated goals, listed in objective measures above, in order to return to maximal functional potential and improve quality of life. PC   Strength: Patient will improve strength to stated goals, listed in objective measures above, in order to improve functional independence and quality of life. PC   GOALS KEY:   PC= progressing/continue;   PM= partially met;             DC= discontinue    Plan     Plan of care Certification: 9/30/2024 to 11/11/2024.     Outpatient Physical Therapy 2 times weekly for 6 weeks to include the following interventions: Cervical/Lumbar Traction, Electrical Stimulation with or without FDN, Gait Training, Manual Therapy, Moist Heat/ Ice, Neuromuscular Re-ed, Orthotic Management and Training, Patient Education, Self Care, Therapeutic Activities, Therapeutic Exercise, and Dry Needling .     Lainey Alonso, PT

## 2024-10-07 ENCOUNTER — CLINICAL SUPPORT (OUTPATIENT)
Dept: REHABILITATION | Facility: HOSPITAL | Age: 64
End: 2024-10-07
Payer: COMMERCIAL

## 2024-10-07 DIAGNOSIS — M25.69 DECREASED RANGE OF MOTION OF TRUNK AND BACK: ICD-10-CM

## 2024-10-07 DIAGNOSIS — R53.1 WEAKNESS: Primary | ICD-10-CM

## 2024-10-07 PROCEDURE — 97140 MANUAL THERAPY 1/> REGIONS: CPT | Mod: PN

## 2024-10-07 PROCEDURE — 97110 THERAPEUTIC EXERCISES: CPT | Mod: PN

## 2024-10-07 PROCEDURE — 97112 NEUROMUSCULAR REEDUCATION: CPT | Mod: PN

## 2024-10-07 NOTE — PROGRESS NOTES
"[facility]   Physical Therapy Treatment Note      Name: Chel Thomas [place]  Clinic Number: [id number]    Therapy Diagnosis:   Encounter Diagnoses   Name Primary?    Weakness Yes    Decreased range of motion of trunk and back      Physician: Shannan Tellez PA-C    Visit Date: 10/7/2024    Physician Orders: PT Eval and Treat   Medical Diagnosis from Referral:  Lumbar back pain [M54.50], Lumbar radiculopathy [M54.16]   Evaluation Date: 9/30/2024  Authorization Period Expiration: 12/31/2024  Plan of Care Expiration: 11/11/2024  Progress Note Due: 10/30/2024  Visit # / Visits authorized: 2/ 20  FOTO: 1/3     Precautions: Standard     PTA Visit #: -/5     Time In: 8:00 am   Time Out: 9:00 am   Total Billable Time: 60 minutes    Subjective     Pt reports: she was hurting significantly night after working and last session. She feels alright today with just some minor burning in the R thigh   She was compliant with home exercise program.  Response to previous treatment: increased pain night after   Functional change: n/a today     Pain: 3/10  Location: R thigh       Objective      Objective Measures updated at progress report unless specified.     Treatment     Chel received the following treatments:    CPT Intervention  JointFocus Duration / Intensity  10/7/2024 Duration / Intensity  10/2/2024   TE Nustep -  6 minutes    TE LTR X 15 5" holds B  X 10 5" holds B   TE Hamstring stretch long sit  5 x 10" holds   5x 10" holds    TE  Piriformis stretch  5 x 10" holds  5 x 10" holds    NMR  TA activation with diaphragmatic breathing  3 minutes + ball press   3 minutes    NMR  PPT X 25 5" holds   2 x 10     TE  DKTC c swiss ball  2  x 10   2 x 10    NMR  Bridges over swiss ball  X 20  X 10     NMR SL clamshells  X 15 5" holds B  X 10 5" holds    NMR SLR  2 x 10 B  2 x 10    TE Cat/ cow  X 10  X 8   NMR Mod plank to prayer stretch  X 6 8" plank holds  5x   TE Lumbar Extension Machine  44# 2 x 10 "      MT Release techniques to B glute medius and piriformis   Long axis distraction B LE  10' 10'   PLAN              CPT Codes available for Billing:   (10) minutes of Manual therapy (MT) to improve pain and ROM.  (26) minutes of Therapeutic Exercise (TE) to develop strength, endurance, range of motion, and flexibility.  (24) minutes of Neuromuscular Re-Education (NMR)  to improve: Balance, Coordination, Kinesthetic, Sense, Proprioception, and Posture.  (-) minutes of Therapeutic Activities (TA) to improve functional performance.  (-) minutes of Gait Training (GT) to improve functional mobility and safety  Unattended Electrical Stimulation (ES) for muscle performance or pain modulation.  Vasopneumatic Device Therapy () for management of swelling/edema. (24305)  BFR: Blood flow restriction applied during exercise  NP or (-): Not Performed  Patient Education and Home Exercises       Education provided:   - explanation and justification of treatments performed     Written Home Exercises Provided: Patient instructed to cont prior HEP. Exercises were reviewed and Chel was able to demonstrate them prior to the end of the session.  Chel demonstrated good  understanding of the education provided. See EMR under Patient Instructions for exercises provided during therapy sessions    Assessment   Treatment continued focusing on neuromotor control redevelopment at trunk and lower extremities, along with stretching/ mobility work for the lower back and hips. Pt has improved tolerance and response to exercises today, denying any cramping in B thighs onset or L sided hip symptoms. Manual was continued with patient reporting relief of burning pain with long axis distraction. Will continue to progress per tolerance.     Chel Is progressing well towards her goals.   Pt prognosis is Good.     Pt will continue to benefit from skilled outpatient physical therapy to address the deficits listed in the problem list box on initial  evaluation, provide pt/family education and to maximize pt's level of independence in the home and community environment.     Pt's spiritual, cultural and educational needs considered and pt agreeable to plan of care and goals.     Anticipated barriers to physical therapy:  chronicity of condition     Goals:   Short Term Goals:  3 weeks Progress   9/30/2024   Pain: Pt will demonstrate improved pain by reports of less than or equal to 6/10 worst pain on the verbal rating scale in order to progress toward maximal functional ability and improve QOL. PC   HEP: Patient will demonstrate independence with HEP in order to progress toward functional independence. PC      Long Term Goals:  6 weeks Progress  9/30/2024   Pain: Pt will demonstrate improved pain by reports of less than or equal to 3/10 worst pain on the verbal rating scale in order to progress toward maximal functional ability and improve QOL.   PC   Function: Patient will demonstrate improved function as indicated by a functional score of greater than or equal to 75 out of 100 on FOTO. PC   Mobility: Patient will improve AROM to stated goals, listed in objective measures above, in order to return to maximal functional potential and improve quality of life. PC   Strength: Patient will improve strength to stated goals, listed in objective measures above, in order to improve functional independence and quality of life. PC   GOALS KEY:   PC= progressing/continue;   PM= partially met;             DC= discontinue    Plan     Plan of care Certification: 9/30/2024 to 11/11/2024.     Outpatient Physical Therapy 2 times weekly for 6 weeks to include the following interventions: Cervical/Lumbar Traction, Electrical Stimulation with or without FDN, Gait Training, Manual Therapy, Moist Heat/ Ice, Neuromuscular Re-ed, Orthotic Management and Training, Patient Education, Self Care, Therapeutic Activities, Therapeutic Exercise, and Dry Needling .     Lainey Alonso, PT

## 2024-10-10 ENCOUNTER — CLINICAL SUPPORT (OUTPATIENT)
Dept: REHABILITATION | Facility: HOSPITAL | Age: 64
End: 2024-10-10
Payer: COMMERCIAL

## 2024-10-10 DIAGNOSIS — R53.1 WEAKNESS: Primary | ICD-10-CM

## 2024-10-10 DIAGNOSIS — M25.69 DECREASED RANGE OF MOTION OF TRUNK AND BACK: ICD-10-CM

## 2024-10-10 PROCEDURE — 97110 THERAPEUTIC EXERCISES: CPT | Mod: PN

## 2024-10-10 PROCEDURE — 97112 NEUROMUSCULAR REEDUCATION: CPT | Mod: PN

## 2024-10-10 PROCEDURE — 97140 MANUAL THERAPY 1/> REGIONS: CPT | Mod: PN

## 2024-10-10 NOTE — PROGRESS NOTES
"[facility]   Physical Therapy Treatment Note      Name: Chel Thomas [place]  Clinic Number: [id number]    Therapy Diagnosis:   Encounter Diagnoses   Name Primary?    Weakness Yes    Decreased range of motion of trunk and back      Physician: Shannan Tellez PA-C    Visit Date: 10/10/2024    Physician Orders: PT Eval and Treat   Medical Diagnosis from Referral:  Lumbar back pain [M54.50], Lumbar radiculopathy [M54.16]   Evaluation Date: 9/30/2024  Authorization Period Expiration: 12/31/2024  Plan of Care Expiration: 11/11/2024  Progress Note Due: 10/30/2024  Visit # / Visits authorized: 3/ 20  FOTO: 1/3     Precautions: Standard     PTA Visit #: -/5     Time In: 8:00 am   Time Out: 9:00 am   Total Billable Time: 60 minutes    Subjective     Pt reports: she is still having increased pain at night up to higher levels, but is left with just a minor burning in the R thigh this morning.   She was compliant with home exercise program.  Response to previous treatment: increased pain night after   Functional change: n/a today     Pain: 3/10  Location: R thigh       Objective      Objective Measures updated at progress report unless specified.     Treatment     Chel received the following treatments:    CPT Intervention  JointFocus Duration / Intensity  10/10/2024 Duration / Intensity  10/7/2024 Duration / Intensity  10/2/2024   TE Nustep 6 minutes  -  6 minutes    TE LTR X 15 5" holds  X 15 5" holds B  X 10 5" holds B   TE Hamstring stretch long sit  3 x 20"  5 x 10" holds   5x 10" holds    TE  Piriformis stretch  5 x 10" holds 5 x 10" holds  5 x 10" holds    NMR  TA activation with diaphragmatic breathing  3 minutes + ball press  3 minutes + ball press   3 minutes    NMR  PPT X 25 5" holds X 25 5" holds   2 x 10     TE  DKTC c swiss ball  X 25  2  x 10   2 x 10    NMR  Bridges over swiss ball  X 20  X 20  X 10     NMR SL clamshells  2 x 10 5" holds  X 15 5" holds B  X 10 5" holds    NMR " "SLR  2 x 10 B 2 x 10 B  2 x 10    TE Cat/ cow  X 10  X 10  X 8   NMR Mod plank to prayer stretch  X 6 8" plank holds  X 6 8" plank holds  5x   TE Lumbar Extension Machine  - 44# 2 x 10      MT STM to lumbar paraspinals, R glute medius and piriformis   Long axis distraction R LE  10' 10' 10'   PLAN              After exs., pt rec'd functional dry needling today as follows: FDN to R glute medius and piriformis using 100 mm fusiform needles and to lumbar paraspinals using 75 mm fusiform needles.  After placement of needles, needles were left in-situ for 8 minutes without manipulation.      Palpation Assessment to determine the necessity for Functional Dry Needling  .   Patient provided written and verbal consent to Functional Dry Needling   Written Handout on response to FDN provided: Yes    Chel demonstrated good  understanding of the education provided.   Patient demonstrated appropriate response to Functional Dry Needling.     CPT Codes available for Billing:   (10) minutes of Manual therapy (MT) to improve pain and ROM.  (26) minutes of Therapeutic Exercise (TE) to develop strength, endurance, range of motion, and flexibility.  (24) minutes of Neuromuscular Re-Education (NMR)  to improve: Balance, Coordination, Kinesthetic, Sense, Proprioception, and Posture.  (-) minutes of Therapeutic Activities (TA) to improve functional performance.  (-) minutes of Gait Training (GT) to improve functional mobility and safety  Unattended Electrical Stimulation (ES) for muscle performance or pain modulation.  Vasopneumatic Device Therapy () for management of swelling/edema. (09125)  BFR: Blood flow restriction applied during exercise  NP or (-): Not Performed  Patient Education and Home Exercises       Education provided:   - explanation and justification of treatments performed     Written Home Exercises Provided: Patient instructed to cont prior HEP. Exercises were reviewed and Chel was able to demonstrate them prior to the " end of the session.  Chel demonstrated good  understanding of the education provided. See EMR under Patient Instructions for exercises provided during therapy sessions    Assessment   Treatment continued focusing on neuromotor control redevelopment at trunk and lower extremities, along with stretching/ mobility work for the lower back and hips.  Some minor progressions were done, but added manual with FDN was also done. Pt tolerates this well without adverse effect. Pain is reported to be lower by end of session. Will continue to progress per tolerance.     Chel Is progressing well towards her goals.   Pt prognosis is Good.     Pt will continue to benefit from skilled outpatient physical therapy to address the deficits listed in the problem list box on initial evaluation, provide pt/family education and to maximize pt's level of independence in the home and community environment.     Pt's spiritual, cultural and educational needs considered and pt agreeable to plan of care and goals.     Anticipated barriers to physical therapy:  chronicity of condition     Goals:   Short Term Goals:  3 weeks Progress   9/30/2024   Pain: Pt will demonstrate improved pain by reports of less than or equal to 6/10 worst pain on the verbal rating scale in order to progress toward maximal functional ability and improve QOL. PC   HEP: Patient will demonstrate independence with HEP in order to progress toward functional independence. PC      Long Term Goals:  6 weeks Progress  9/30/2024   Pain: Pt will demonstrate improved pain by reports of less than or equal to 3/10 worst pain on the verbal rating scale in order to progress toward maximal functional ability and improve QOL.   PC   Function: Patient will demonstrate improved function as indicated by a functional score of greater than or equal to 75 out of 100 on FOTO. PC   Mobility: Patient will improve AROM to stated goals, listed in objective measures above, in order to return to  maximal functional potential and improve quality of life. PC   Strength: Patient will improve strength to stated goals, listed in objective measures above, in order to improve functional independence and quality of life. PC   GOALS KEY:   PC= progressing/continue;   PM= partially met;             DC= discontinue    Plan     Plan of care Certification: 9/30/2024 to 11/11/2024.     Outpatient Physical Therapy 2 times weekly for 6 weeks to include the following interventions: Cervical/Lumbar Traction, Electrical Stimulation with or without FDN, Gait Training, Manual Therapy, Moist Heat/ Ice, Neuromuscular Re-ed, Orthotic Management and Training, Patient Education, Self Care, Therapeutic Activities, Therapeutic Exercise, and Dry Needling .     Lainey Alonso, PT

## 2024-10-14 ENCOUNTER — CLINICAL SUPPORT (OUTPATIENT)
Dept: REHABILITATION | Facility: HOSPITAL | Age: 64
End: 2024-10-14
Payer: COMMERCIAL

## 2024-10-14 DIAGNOSIS — R53.1 WEAKNESS: Primary | ICD-10-CM

## 2024-10-14 DIAGNOSIS — M25.69 DECREASED RANGE OF MOTION OF TRUNK AND BACK: ICD-10-CM

## 2024-10-14 PROCEDURE — 97140 MANUAL THERAPY 1/> REGIONS: CPT | Mod: PN

## 2024-10-14 PROCEDURE — 97112 NEUROMUSCULAR REEDUCATION: CPT | Mod: PN

## 2024-10-14 PROCEDURE — 97110 THERAPEUTIC EXERCISES: CPT | Mod: PN

## 2024-10-14 NOTE — PROGRESS NOTES
"[facility]   Physical Therapy Treatment Note      Name: Chel Thomas [place]  Clinic Number: [id number]    Therapy Diagnosis:   Encounter Diagnoses   Name Primary?    Weakness Yes    Decreased range of motion of trunk and back        Physician: Shannan Tellez PA-C    Visit Date: 10/14/2024    Physician Orders: PT Eval and Treat   Medical Diagnosis from Referral:  Lumbar back pain [M54.50], Lumbar radiculopathy [M54.16]   Evaluation Date: 9/30/2024  Authorization Period Expiration: 12/31/2024  Plan of Care Expiration: 11/11/2024  Progress Note Due: 10/30/2024  Visit # / Visits authorized: 4/ 20  FOTO: 1/3 (FOTO NV)     Precautions: Standard     PTA Visit #: -/5     Time In: 2:30 pm   Time Out: 3:30 pm   Total Billable Time: 60 minutes (partially 1:1 with trained technician)     Subjective     Pt reports: she has had better nights. She did have about 5-6/10 pain this morning, but as she was working pain lowered a bit.   She was compliant with home exercise program.  Response to previous treatment: increased pain night after   Functional change: n/a today     Pain: 2/10  Location: R thigh/ hip     Objective      Objective Measures updated at progress report unless specified.     Treatment     Chel received the following treatments:     CPT Intervention  JointFocus Duration / Intensity  10/14/2024 Duration / Intensity  10/10/2024 Duration / Intensity  10/7/2024   TE Nustep 6 minutes 6 minutes  -   TE LTR X 15 5" holds  X 15 5" holds  X 15 5" holds B    TE Hamstring stretch long sit  3 x20" 3 x 20"  5 x 10" holds    TE  Piriformis stretch  5 x 10" holds  5 x 10" holds 5 x 10" holds   NMR  TA activation with diaphragmatic breathing  3 minutes + ball press  3 minutes + ball press  3 minutes + ball press    NMR  PPT X 25 5" holds X 25 5" holds X 25 5" holds     TE  DKTC c swiss ball  X 25  X 25  2  x 10    NMR  Bridges over swiss ball  X20 X 20  X 20    NMR SL clamshells  2 x 10 5" holds  " "2 x 10 5" holds  X 15 5" holds B    NMR SLR  2 x 10 B 2 x 10 B 2 x 10 B    TE Cat/ cow  X10 X 10  X 10    NMR Mod plank to prayer stretch  X 8 8" plank holds  X 6 8" plank holds  X 6 8" plank 4holds    NMR Pallof Press  X 15 30# B        TE Lumbar Extension Machine  44# 2 x 10  - 44# 2 x 10    MT STM to lumbar paraspinals, R glute medius and piriformis  8' 10' 10'   PLAN                After exs., pt rec'd functional dry needling today as follows: FDN to R glute medius and piriformis using 100 mm fusiform needles and to lumbar paraspinals using 75 mm fusiform needles.  After placement of needles, needles were left in-situ for 8 minutes without manipulation.       CPT Codes available for Billing:   (8) minutes of Manual therapy (MT) to improve pain and ROM.  (26) minutes of Therapeutic Exercise (TE) to develop strength, endurance, range of motion, and flexibility.  (26) minutes of Neuromuscular Re-Education (NMR)  to improve: Balance, Coordination, Kinesthetic, Sense, Proprioception, and Posture.  (-) minutes of Therapeutic Activities (TA) to improve functional performance.  (-) minutes of Gait Training (GT) to improve functional mobility and safety  Unattended Electrical Stimulation (ES) for muscle performance or pain modulation.  Vasopneumatic Device Therapy () for management of swelling/edema. (06166)  BFR: Blood flow restriction applied during exercise  NP or (-): Not Performed  Patient Education and Home Exercises       Education provided:   - explanation and justification of treatments performed     Written Home Exercises Provided: Patient instructed to cont prior HEP. Exercises were reviewed and Chel was able to demonstrate them prior to the end of the session.  Chel demonstrated good  understanding of the education provided. See EMR under Patient Instructions for exercises provided during therapy sessions    Assessment   Treatment continued focusing on neuromotor control redevelopment at trunk and lower " extremities, along with stretching/ mobility work for the lower back and hips. Progressions were continued, as symptoms improve. Pt tolerates this well with only some minor R hip discomfort by end of session. Continued with manual therapies to address this per patient request. Will continue to progress per tolerance.     Chel Is progressing well towards her goals.   Pt prognosis is Good.     Pt will continue to benefit from skilled outpatient physical therapy to address the deficits listed in the problem list box on initial evaluation, provide pt/family education and to maximize pt's level of independence in the home and community environment.     Pt's spiritual, cultural and educational needs considered and pt agreeable to plan of care and goals.     Anticipated barriers to physical therapy:  chronicity of condition     Goals:   Short Term Goals:  3 weeks Progress   9/30/2024   Pain: Pt will demonstrate improved pain by reports of less than or equal to 6/10 worst pain on the verbal rating scale in order to progress toward maximal functional ability and improve QOL. PC   HEP: Patient will demonstrate independence with HEP in order to progress toward functional independence. PC      Long Term Goals:  6 weeks Progress  9/30/2024   Pain: Pt will demonstrate improved pain by reports of less than or equal to 3/10 worst pain on the verbal rating scale in order to progress toward maximal functional ability and improve QOL.   PC   Function: Patient will demonstrate improved function as indicated by a functional score of greater than or equal to 75 out of 100 on FOTO. PC   Mobility: Patient will improve AROM to stated goals, listed in objective measures above, in order to return to maximal functional potential and improve quality of life. PC   Strength: Patient will improve strength to stated goals, listed in objective measures above, in order to improve functional independence and quality of life. PC   GOALS KEY:   PC=  progressing/continue;   PM= partially met;             DC= discontinue    Plan     Plan of care Certification: 9/30/2024 to 11/11/2024.     Outpatient Physical Therapy 2 times weekly for 6 weeks to include the following interventions: Cervical/Lumbar Traction, Electrical Stimulation with or without FDN, Gait Training, Manual Therapy, Moist Heat/ Ice, Neuromuscular Re-ed, Orthotic Management and Training, Patient Education, Self Care, Therapeutic Activities, Therapeutic Exercise, and Dry Needling .     Lainey Alonso, PT

## 2024-10-17 ENCOUNTER — CLINICAL SUPPORT (OUTPATIENT)
Dept: REHABILITATION | Facility: HOSPITAL | Age: 64
End: 2024-10-17
Payer: COMMERCIAL

## 2024-10-17 DIAGNOSIS — R53.1 WEAKNESS: Primary | ICD-10-CM

## 2024-10-17 DIAGNOSIS — M25.69 DECREASED RANGE OF MOTION OF TRUNK AND BACK: ICD-10-CM

## 2024-10-17 PROCEDURE — 97112 NEUROMUSCULAR REEDUCATION: CPT | Mod: PN

## 2024-10-17 PROCEDURE — 97110 THERAPEUTIC EXERCISES: CPT | Mod: PN

## 2024-10-17 PROCEDURE — 97140 MANUAL THERAPY 1/> REGIONS: CPT | Mod: PN

## 2024-10-17 NOTE — PROGRESS NOTES
"[facility]   Physical Therapy Treatment Note      Name: Chel Thomas [place]  Clinic Number: [id number]    Therapy Diagnosis:   Encounter Diagnoses   Name Primary?    Weakness Yes    Decreased range of motion of trunk and back        Physician: Shannan Tellez PA-C    Visit Date: 10/17/2024    Physician Orders: PT Eval and Treat   Medical Diagnosis from Referral:  Lumbar back pain [M54.50], Lumbar radiculopathy [M54.16]   Evaluation Date: 9/30/2024  Authorization Period Expiration: 12/31/2024  Plan of Care Expiration: 11/11/2024  Progress Note Due: 10/30/2024  Visit # / Visits authorized: 5/ 20  FOTO: 1/3 (FOTO NV)     Precautions: Standard     PTA Visit #: -/5     Time In: 9:00 am   Time Out: 10:00 am   Total Billable Time: 60 minutes (partially 1:1 with trained technician)     Subjective     Pt reports: she is still having some burning in the R hip/ thigh   She was compliant with home exercise program.  Response to previous treatment: increased pain night after   Functional change: n/a today     Pain: 3/10  Location: R thigh/ hip     Objective      Objective Measures updated at progress report unless specified.     Treatment     Chel received the following treatments:       CPT Intervention  JointFocus Duration / Intensity  10/17/2024 Duration / Intensity  10/14/2024 Duration / Intensity  10/10/2024   TE Nustep 6 minutes 6 minutes 6 minutes    TE LTR X15 5" holds X 15 5" holds  X 15 5" holds    TE Hamstring stretch long sit  3x 20" 3 x20" 3 x 20"    TE  Piriformis stretch  5 x 10" holds 5 x 10" holds  5 x 10" holds   NMR  TA activation with diaphragmatic breathing  3 minutes + ball press 3 minutes + ball press  3 minutes + ball press    NMR  PPT X25 5" holds X 25 5" holds X 25 5" holds    TE  DKTC c swiss ball  X25 X 25  X 25    NMR  Bridges over swiss ball  X20 X20 X 20    NMR SL clamshells  2 x 10 5" holds 2 x 10 5" holds  2 x 10 5" holds    NMR SLR  2 x 10 B 2 x 10 B 2 x 10 B " "  TE Cat/ cow  X 10 X10 X 10    NMR Mod plank to prayer stretch  X8 8" plak holds X8 8" plank holds  X 6 8" plank holds    NMR Pallof Press  X15 30# B X 15 30# B      TE Lumbar Extension Machine  46# 2x10 44# 2 x 10  -   MT STM and release techniques to lumbar paraspinals, R glute medius and piriformis   8' 8' 10'   PLAN                CPT Codes available for Billing:   (8) minutes of Manual therapy (MT) to improve pain and ROM.  (26) minutes of Therapeutic Exercise (TE) to develop strength, endurance, range of motion, and flexibility.  (26) minutes of Neuromuscular Re-Education (NMR)  to improve: Balance, Coordination, Kinesthetic, Sense, Proprioception, and Posture.  (-) minutes of Therapeutic Activities (TA) to improve functional performance.  (-) minutes of Gait Training (GT) to improve functional mobility and safety  Unattended Electrical Stimulation (ES) for muscle performance or pain modulation.  Vasopneumatic Device Therapy () for management of swelling/edema. (39934)  BFR: Blood flow restriction applied during exercise  NP or (-): Not Performed  Patient Education and Home Exercises       Education provided:   - explanation and justification of treatments performed     Written Home Exercises Provided: Patient instructed to cont prior HEP. Exercises were reviewed and Chel was able to demonstrate them prior to the end of the session.  Chel demonstrated good  understanding of the education provided. See EMR under Patient Instructions for exercises provided during therapy sessions    Assessment   Treatment continued focusing on neuromotor control redevelopment at trunk and lower extremities, along with stretching/ mobility work for the lower back and hips. Only minimal progressions were made as pain in thigh persists. Manual therapies were done at end of session to further address tissue tension developed in the R hip. Pt reports immediate relief with this. Will continue to progress per tolerance.     Chel" Is progressing well towards her goals.   Pt prognosis is Good.     Pt will continue to benefit from skilled outpatient physical therapy to address the deficits listed in the problem list box on initial evaluation, provide pt/family education and to maximize pt's level of independence in the home and community environment.     Pt's spiritual, cultural and educational needs considered and pt agreeable to plan of care and goals.     Anticipated barriers to physical therapy:  chronicity of condition     Goals:   Short Term Goals:  3 weeks Progress   9/30/2024   Pain: Pt will demonstrate improved pain by reports of less than or equal to 6/10 worst pain on the verbal rating scale in order to progress toward maximal functional ability and improve QOL. PC   HEP: Patient will demonstrate independence with HEP in order to progress toward functional independence. PC      Long Term Goals:  6 weeks Progress  9/30/2024   Pain: Pt will demonstrate improved pain by reports of less than or equal to 3/10 worst pain on the verbal rating scale in order to progress toward maximal functional ability and improve QOL.   PC   Function: Patient will demonstrate improved function as indicated by a functional score of greater than or equal to 75 out of 100 on FOTO. PC   Mobility: Patient will improve AROM to stated goals, listed in objective measures above, in order to return to maximal functional potential and improve quality of life. PC   Strength: Patient will improve strength to stated goals, listed in objective measures above, in order to improve functional independence and quality of life. PC   GOALS KEY:   PC= progressing/continue;   PM= partially met;             DC= discontinue    Plan     Plan of care Certification: 9/30/2024 to 11/11/2024.     Outpatient Physical Therapy 2 times weekly for 6 weeks to include the following interventions: Cervical/Lumbar Traction, Electrical Stimulation with or without FDN, Gait Training, Manual  Therapy, Moist Heat/ Ice, Neuromuscular Re-ed, Orthotic Management and Training, Patient Education, Self Care, Therapeutic Activities, Therapeutic Exercise, and Dry Needling .     Lainey Alonso, PT

## 2024-10-21 ENCOUNTER — CLINICAL SUPPORT (OUTPATIENT)
Dept: REHABILITATION | Facility: HOSPITAL | Age: 64
End: 2024-10-21
Payer: COMMERCIAL

## 2024-10-21 DIAGNOSIS — R53.1 WEAKNESS: Primary | ICD-10-CM

## 2024-10-21 DIAGNOSIS — M25.69 DECREASED RANGE OF MOTION OF TRUNK AND BACK: ICD-10-CM

## 2024-10-21 PROCEDURE — 97110 THERAPEUTIC EXERCISES: CPT | Mod: PN

## 2024-10-21 PROCEDURE — 97112 NEUROMUSCULAR REEDUCATION: CPT | Mod: PN

## 2024-10-21 NOTE — PROGRESS NOTES
"[facility]   Physical Therapy Treatment Note      Name: Chel [place]  Clinic Number: [id number]    Therapy Diagnosis:   Encounter Diagnoses   Name Primary?    Weakness Yes    Decreased range of motion of trunk and back        Physician: Shannan Tellez PA-C    Visit Date: 10/21/2024    Physician Orders: PT Eval and Treat   Medical Diagnosis from Referral:  Lumbar back pain [M54.50], Lumbar radiculopathy [M54.16]   Evaluation Date: 9/30/2024  Authorization Period Expiration: 12/31/2024  Plan of Care Expiration: 11/11/2024  Progress Note Due: 10/30/2024  Visit # / Visits authorized: 5/ 20  FOTO: 1/3 (FOTO NV)     Precautions: Standard     PTA Visit #: -/5     Time In: 3:30 pm   Time Out: 4:25 pm   Total Billable Time: 55 minutes (1:1 with trained technician)     Subjective     Pt reports: her burning pain in the R thigh has noticeably lessened up in the last couple days   She was compliant with home exercise program.  Response to previous treatment: increased pain night after   Functional change: n/a today     Pain: 2/10  Location: R thigh/ hip     Objective      Objective Measures updated at progress report unless specified.     Treatment     Chel received the following treatments:     CPT Intervention  JointFocus Duration / Intensity  10/21/2024 Duration / Intensity  10/17/2024 Duration / Intensity  10/14/2024   TE Nustep - 6 minutes 6 minutes   TE LTR X15 5" holds X15 5" holds X 15 5" holds    TE Hamstring stretch long sit  3x20" 3x 20" 3 x20"   TE  Piriformis stretch  5 x 10" holds 5 x 10" holds 5 x 10" holds    NMR  TA activation with diaphragmatic breathing  3 minutes + ball press 3 minutes + ball press 3 minutes + ball press    NMR  PPT X25 5"  X25 5" holds X 25 5" holds    TE  DKTC c swiss ball  X25 X25 X 25    NMR  Bridges over swiss ball  X20 X20 X20   NMR SL clamshells  2x10 5" RTB 2 x 10 5" holds 2 x 10 5" holds    NMR SLR  2x10 B 2 x 10 B 2 x 10 B   TE Cat/ cow  " "X10 X 10 X10   NMR Mod plank to prayer stretch  X8 10" plank hold X8 8" plak holds X8 8" plank holds    NMR Pallof Press  X15 30# B X15 30# B X 15 30# B    TE Lumbar Extension Machine  48# 2x10 46# 2x10 44# 2 x 10    MT STM and release techniques to lumbar paraspinals, R glute medius and piriformis   NP 8' 8'   PLAN              CPT Codes available for Billing:   (-) minutes of Manual therapy (MT) to improve pain and ROM.  (27) minutes of Therapeutic Exercise (TE) to develop strength, endurance, range of motion, and flexibility.  (28) minutes of Neuromuscular Re-Education (NMR)  to improve: Balance, Coordination, Kinesthetic, Sense, Proprioception, and Posture.  (-) minutes of Therapeutic Activities (TA) to improve functional performance.  (-) minutes of Gait Training (GT) to improve functional mobility and safety  Unattended Electrical Stimulation (ES) for muscle performance or pain modulation.  Vasopneumatic Device Therapy () for management of swelling/edema. (55264)  BFR: Blood flow restriction applied during exercise  NP or (-): Not Performed  Patient Education and Home Exercises       Education provided:   - explanation and justification of treatments performed     Written Home Exercises Provided: Patient instructed to cont prior HEP. Exercises were reviewed and Chel was able to demonstrate them prior to the end of the session.  Chel demonstrated good  understanding of the education provided. See EMR under Patient Instructions for exercises provided during therapy sessions    Assessment   Treatment continued focusing on neuromotor control redevelopment at trunk and lower extremities, along with stretching/ mobility work for the lower back and hips. Progressions in exercises were made to further challenge these aspects of her lower extremity and trunk musculature, as symptoms improve. Manual therapies held today to focus more on this. Pt continues to have increased difficulty with motor patterning for PPT " with need for max cues. Good independent PPT is only noted when patient is placed in quadruped, but she still struggles to isolate movement. Pt reports very minimal discomfort by end of session. Will continue to progress per tolerance.     Chel Is progressing well towards her goals.   Pt prognosis is Good.     Pt will continue to benefit from skilled outpatient physical therapy to address the deficits listed in the problem list box on initial evaluation, provide pt/family education and to maximize pt's level of independence in the home and community environment.     Pt's spiritual, cultural and educational needs considered and pt agreeable to plan of care and goals.     Anticipated barriers to physical therapy:  chronicity of condition     Goals:   Short Term Goals:  3 weeks Progress   9/30/2024   Pain: Pt will demonstrate improved pain by reports of less than or equal to 6/10 worst pain on the verbal rating scale in order to progress toward maximal functional ability and improve QOL. PC   HEP: Patient will demonstrate independence with HEP in order to progress toward functional independence. PC      Long Term Goals:  6 weeks Progress  9/30/2024   Pain: Pt will demonstrate improved pain by reports of less than or equal to 3/10 worst pain on the verbal rating scale in order to progress toward maximal functional ability and improve QOL.   PC   Function: Patient will demonstrate improved function as indicated by a functional score of greater than or equal to 75 out of 100 on FOTO. PC   Mobility: Patient will improve AROM to stated goals, listed in objective measures above, in order to return to maximal functional potential and improve quality of life. PC   Strength: Patient will improve strength to stated goals, listed in objective measures above, in order to improve functional independence and quality of life. PC   GOALS KEY:   PC= progressing/continue;   PM= partially met;             DC= discontinue    Plan      Plan of care Certification: 9/30/2024 to 11/11/2024.     Outpatient Physical Therapy 2 times weekly for 6 weeks to include the following interventions: Cervical/Lumbar Traction, Electrical Stimulation with or without FDN, Gait Training, Manual Therapy, Moist Heat/ Ice, Neuromuscular Re-ed, Orthotic Management and Training, Patient Education, Self Care, Therapeutic Activities, Therapeutic Exercise, and Dry Needling .     Lainey Alonso, PT

## 2024-10-29 ENCOUNTER — CLINICAL SUPPORT (OUTPATIENT)
Dept: REHABILITATION | Facility: HOSPITAL | Age: 64
End: 2024-10-29
Payer: COMMERCIAL

## 2024-10-29 DIAGNOSIS — R53.1 WEAKNESS: Primary | ICD-10-CM

## 2024-10-29 DIAGNOSIS — M25.69 DECREASED RANGE OF MOTION OF TRUNK AND BACK: ICD-10-CM

## 2024-10-29 PROCEDURE — 97140 MANUAL THERAPY 1/> REGIONS: CPT | Mod: PN

## 2024-10-29 PROCEDURE — 97110 THERAPEUTIC EXERCISES: CPT | Mod: PN

## 2024-10-29 PROCEDURE — 97112 NEUROMUSCULAR REEDUCATION: CPT | Mod: PN

## 2024-10-31 ENCOUNTER — CLINICAL SUPPORT (OUTPATIENT)
Dept: REHABILITATION | Facility: HOSPITAL | Age: 64
End: 2024-10-31
Payer: COMMERCIAL

## 2024-10-31 DIAGNOSIS — R53.1 WEAKNESS: Primary | ICD-10-CM

## 2024-10-31 DIAGNOSIS — M25.69 DECREASED RANGE OF MOTION OF TRUNK AND BACK: ICD-10-CM

## 2024-10-31 PROCEDURE — 97110 THERAPEUTIC EXERCISES: CPT | Mod: PN

## 2024-10-31 PROCEDURE — 97112 NEUROMUSCULAR REEDUCATION: CPT | Mod: PN

## 2024-11-04 ENCOUNTER — CLINICAL SUPPORT (OUTPATIENT)
Dept: REHABILITATION | Facility: HOSPITAL | Age: 64
End: 2024-11-04
Payer: COMMERCIAL

## 2024-11-04 DIAGNOSIS — M25.69 DECREASED RANGE OF MOTION OF TRUNK AND BACK: ICD-10-CM

## 2024-11-04 DIAGNOSIS — R53.1 WEAKNESS: Primary | ICD-10-CM

## 2024-11-04 PROCEDURE — 97110 THERAPEUTIC EXERCISES: CPT | Mod: PN

## 2024-11-04 PROCEDURE — 97112 NEUROMUSCULAR REEDUCATION: CPT | Mod: PN

## 2024-11-04 NOTE — PROGRESS NOTES
"[facility]   Physical Therapy Treatment Note      Name: Chel Thomas [place]  Clinic Number: [id number]    Therapy Diagnosis:   Encounter Diagnoses   Name Primary?    Weakness Yes    Decreased range of motion of trunk and back          Physician: Shannan Tellez PA-C    Visit Date: 11/4/2024    Physician Orders: PT Eval and Treat   Medical Diagnosis from Referral:  Lumbar back pain [M54.50], Lumbar radiculopathy [M54.16]   Evaluation Date: 9/30/2024  Authorization Period Expiration: 12/31/2024  Plan of Care Expiration: 11/11/2024  Progress Note Due: 10/30/2024  Visit # / Visits authorized: 9/ 20  FOTO: 2/3     Precautions: Standard     PTA Visit #: 1/5     Time In: 8:30 am   Time Out: 9:30 am   Total Billable Time: 60 minutes (partially 1:1 with trained technician)     Subjective     Pt reports:pain has become more come and go with the burning at night. She is just having some minor numbness/tingling type sensations on the R lateral thigh today.   She was compliant with home exercise program.  Response to previous treatment: improved symptoms   Functional change: n/a today     Pain: 1/10  Location: R thigh/ hip     Objective      Objective Measures updated at progress report unless specified.     Treatment     Chel received the following treatments:          CPT Intervention  JointFocus Duration / Intensity  11/4/2024 Duration / Intensity  10/31/2024 Duration / Intensity  10/29/2024   TE Nustep 6 minutes  6 minutes  6 minutes    NMR Sciatic Nerve glides  X 15        TE Hamstring stretch long sit  - 3 x 20"  -   TE  Piriformis stretch  rotation 5 x 10" holds + rotation 5 x 10" holds 5 x 10" holds   NMR Bird Dog  10 B        NMR Dead Bugs 3 x 20 3 x 20 -   NMR Table Top Holds  X5 10" holds  X5 10" holds       TE  DKTC c swiss ball  X 30 X 30  X 25   NMR  Bridges over swiss ball  X 30 X30 X 25   NMR SL clamshells  2 x 15 5" holds GTB 2 x 15 5" holds GTB 2 x 10 5" holds RTB   NMR SLR  " "Hip ABCs x 2 sets R  2 x 12 2x 10   TE Cat/ cow  20 X 15  X 10   NMR Mod plank to prayer stretch  - X10 10" plank holds  X 10 10" plank hold   NMR Pallof Press  X 15 30# B X 15 30# B  -   TE Lumbar Extension Machine  62# 2 x 10  62# 2 x 10  48# 2 x 10   TA Weighted fwd carry  10# ball 2 laps turf       MT STM to R glute medius and piriformis   NP NP 8'   PLAN                 CPT Codes available for Billing:   (-) minutes of Manual therapy (MT) to improve pain and ROM.  (24) minutes of Therapeutic Exercise (TE) to develop strength, endurance, range of motion, and flexibility.  (36) minutes of Neuromuscular Re-Education (NMR)  to improve: Balance, Coordination, Kinesthetic, Sense, Proprioception, and Posture.  (-) minutes of Therapeutic Activities (TA) to improve functional performance.  (-) minutes of Gait Training (GT) to improve functional mobility and safety  Unattended Electrical Stimulation (ES) for muscle performance or pain modulation.  Vasopneumatic Device Therapy () for management of swelling/edema. (20118)  BFR: Blood flow restriction applied during exercise  NP or (-): Not Performed  Patient Education and Home Exercises       Education provided:   - explanation and justification of treatments performed     Written Home Exercises Provided: Patient instructed to cont prior HEP. Exercises were reviewed and Chel was able to demonstrate them prior to the end of the session.  Chel demonstrated good  understanding of the education provided. See EMR under Patient Instructions for exercises provided during therapy sessions.    Assessment   Treatment continued focusing on neuromotor control redevelopment at trunk and lower extremities with some added nerve mobility work. Pt tolerates this well with no pain noted by midway through session. Pt did have some dizziness onset with attempt at mod planks today, so dead bugs were done instead. Dizziness was relieved once pt was settled with supine positioning during " following exercises. Pt notes she has noticed bad nights are after long days at work, so we will continue to focus on developing more functional core stability with dynamic tasks.     Chel Is progressing well towards her goals.   Pt prognosis is Good.     Pt will continue to benefit from skilled outpatient physical therapy to address the deficits listed in the problem list box on initial evaluation, provide pt/family education and to maximize pt's level of independence in the home and community environment.     Pt's spiritual, cultural and educational needs considered and pt agreeable to plan of care and goals.     Anticipated barriers to physical therapy:  chronicity of condition     Goals:   Short Term Goals:  3 weeks Progress   9/30/2024   Pain: Pt will demonstrate improved pain by reports of less than or equal to 6/10 worst pain on the verbal rating scale in order to progress toward maximal functional ability and improve QOL. PC   HEP: Patient will demonstrate independence with HEP in order to progress toward functional independence. PC      Long Term Goals:  6 weeks Progress  9/30/2024   Pain: Pt will demonstrate improved pain by reports of less than or equal to 3/10 worst pain on the verbal rating scale in order to progress toward maximal functional ability and improve QOL.   PC   Function: Patient will demonstrate improved function as indicated by a functional score of greater than or equal to 75 out of 100 on FOTO. PC   Mobility: Patient will improve AROM to stated goals, listed in objective measures above, in order to return to maximal functional potential and improve quality of life. PC   Strength: Patient will improve strength to stated goals, listed in objective measures above, in order to improve functional independence and quality of life. PC   GOALS KEY:   PC= progressing/continue;   PM= partially met;             DC= discontinue    Plan     Plan of care Certification: 9/30/2024 to 11/11/2024.      Outpatient Physical Therapy 2 times weekly for 6 weeks to include the following interventions: Cervical/Lumbar Traction, Electrical Stimulation with or without FDN, Gait Training, Manual Therapy, Moist Heat/ Ice, Neuromuscular Re-ed, Orthotic Management and Training, Patient Education, Self Care, Therapeutic Activities, Therapeutic Exercise, and Dry Needling .     Lainey Alonso, PT

## 2024-11-05 ENCOUNTER — OFFICE VISIT (OUTPATIENT)
Dept: PAIN MEDICINE | Facility: CLINIC | Age: 64
End: 2024-11-05
Payer: COMMERCIAL

## 2024-11-05 VITALS
WEIGHT: 213.31 LBS | SYSTOLIC BLOOD PRESSURE: 132 MMHG | HEART RATE: 94 BPM | BODY MASS INDEX: 32.43 KG/M2 | DIASTOLIC BLOOD PRESSURE: 75 MMHG

## 2024-11-05 DIAGNOSIS — M54.50 LUMBAR BACK PAIN: ICD-10-CM

## 2024-11-05 DIAGNOSIS — M54.16 LUMBAR RADICULOPATHY, CHRONIC: Primary | ICD-10-CM

## 2024-11-05 PROCEDURE — 3078F DIAST BP <80 MM HG: CPT | Mod: CPTII,S$GLB,, | Performed by: PHYSICIAN ASSISTANT

## 2024-11-05 PROCEDURE — 1160F RVW MEDS BY RX/DR IN RCRD: CPT | Mod: CPTII,S$GLB,, | Performed by: PHYSICIAN ASSISTANT

## 2024-11-05 PROCEDURE — 3044F HG A1C LEVEL LT 7.0%: CPT | Mod: CPTII,S$GLB,, | Performed by: PHYSICIAN ASSISTANT

## 2024-11-05 PROCEDURE — 3075F SYST BP GE 130 - 139MM HG: CPT | Mod: CPTII,S$GLB,, | Performed by: PHYSICIAN ASSISTANT

## 2024-11-05 PROCEDURE — 1159F MED LIST DOCD IN RCRD: CPT | Mod: CPTII,S$GLB,, | Performed by: PHYSICIAN ASSISTANT

## 2024-11-05 PROCEDURE — 99999 PR PBB SHADOW E&M-EST. PATIENT-LVL III: CPT | Mod: PBBFAC,,, | Performed by: PHYSICIAN ASSISTANT

## 2024-11-05 PROCEDURE — 99213 OFFICE O/P EST LOW 20 MIN: CPT | Mod: S$GLB,,, | Performed by: PHYSICIAN ASSISTANT

## 2024-11-05 PROCEDURE — 3008F BODY MASS INDEX DOCD: CPT | Mod: CPTII,S$GLB,, | Performed by: PHYSICIAN ASSISTANT

## 2024-11-05 NOTE — PROGRESS NOTES
[facility] Established Patient        PCP:  Yesica Burk MD    CC:   Chief Complaint   Patient presents with    Leg Pain          HPI:   Chel Thompson is a 63 y.o. year old female patient who has a past medical history of Hypertension, Hypertensive heart disease without heart failure, Pancreatitis, and UTI (urinary tract infection). She presents for follow up of lower back and right thigh pain.  She has been going to PT.  With PT and home exercise, lower back pain has resolved.  However, she has no improvement in and continues to experienced a burning numbness in the right right posterior/ lateral thigh.  Thigh sensation is mainly felt at night with layiing down to rest.  She did notice when she did work a second job one day, she did not have the pain that night. Pain is still bothersome enough that she wants to further evaluation.       HPI 9/24/24  She presents for lower back pain.  She has had pain for approximately 1 year.  Pain is increased over the last few weeks.  Pain is felt in the right lower back with radiation to the right posterolateral thigh stopping at the knee.  A few nights ago she also experienced left buttock and superior thigh pain.  She describes pain as burning, numb, sharp, stabbing.  Pain increases at night, while walking, while standing.  Pain improves with rest and elevating the legs.  She was seen in urgent care 09/13/2024 at which time she was treated with IM Toradol and given prescriptions for Mobic and Medrol.  She has completed the steroid without improvement.  She has not had any other treatments.    Denies bowel/ bladder incontinence.    Past and current medications:  Antineuropathics:  NSAIDs: aleve, meloxicam  Antidepressants:  Muscle relaxers:  Opioids:  Antiplatelets/Anticoagulants:  Others:  tried and completed medrol taper    Physical Therapy/ Chiropractic care:  PT  - undergoing; resolution of back pain.  Continued right thigh pain.    Pain  Intervention History:  none    Past Spine Surgical History:  none        History:    Current Outpatient Medications:     cyclobenzaprine (FLEXERIL) 5 MG tablet, Take 1 tablet (5 mg total) by mouth 3 (three) times daily as needed for Muscle spasms., Disp: 60 tablet, Rfl: 2    traZODone (DESYREL) 50 MG tablet, Take 1 tablet (50 mg total) by mouth every evening., Disp: 30 tablet, Rfl: 5    valsartan-hydrochlorothiazide (DIOVAN-HCT) 160-12.5 mg per tablet, Take 1 tablet by mouth every morning., Disp: , Rfl:     Past Medical History:   Diagnosis Date    Hypertension     Hypertensive heart disease without heart failure 02/04/2021    Pancreatitis     4 times (2009,2013)    UTI (urinary tract infection)        Past Surgical History:   Procedure Laterality Date    COLONOSCOPY N/A 8/22/2024    Procedure: COLONOSCOPY;  Surgeon: Kya Garcia MD;  Location: [facility];  Service: Endoscopy;  Laterality: N/A;    DILATION AND CURETTAGE OF UTERUS      MULTIPLE TOOTH EXTRACTIONS      vaginal delivery      varicose veins         Family History   Problem Relation Name Age of Onset    Asthma Mother      Hypertension Mother      COPD Brother 3     Cancer Maternal Grandmother      Cancer Maternal Grandfather         Social History     Socioeconomic History    Marital status:      Spouse name: Shen Thompson    Number of children: 1   Tobacco Use    Smoking status: Never    Smokeless tobacco: Never   Substance and Sexual Activity    Alcohol use: Yes     Comment: occasionally    Drug use: No    Sexual activity: Yes     Partners: Male     Social Drivers of Health     Financial Resource Strain: Low Risk  (8/6/2024)    Overall Financial Resource Strain (CARDIA)     Difficulty of Paying Living Expenses: Not hard at all   Food Insecurity: No Food Insecurity (8/6/2024)    Hunger Vital Sign     Worried About Running Out of Food in the Last Year: Never true     Ran Out of Food in the Last Year: Never true   Transportation Needs: No  Transportation Needs (5/30/2023)    PRAPARE - Transportation     Lack of Transportation (Medical): No     Lack of Transportation (Non-Medical): No   Physical Activity: Sufficiently Active (8/6/2024)    Exercise Vital Sign     Days of Exercise per Week: 5 days     Minutes of Exercise per Session: 30 min   Stress: Stress Concern Present (8/6/2024)    [employer] - Occupational Stress Questionnaire     Feeling of Stress : To some extent   Housing Stability: Unknown (5/30/2023)    Housing Stability Vital Sign     Unable to Pay for Housing in the Last Year: No     Unstable Housing in the Last Year: No       Review of patient's allergies indicates:   Allergen Reactions    Dilaudid [hydromorphone] Itching     From IV    Nitrofurantoin Other (See Comments)     Cramping of legs       Labs:  Lab Results   Component Value Date    HGBA1C 5.3 08/06/2024       Lab Results   Component Value Date    WBC 5.30 08/06/2024    HGB 14.4 08/06/2024    HCT 45.4 08/06/2024    MCV 94 08/06/2024     08/06/2024           Review of Systems:  Low back pain.  Right thigh pain..  Balance of review of systems is negative.    Physical Exam:  Vitals:    11/05/24 0819   BP: 132/75   Pulse: 94   Weight: 96.7 kg (213 lb 4.7 oz)   PainSc:   1   PainLoc: Leg     Body mass index is 32.43 kg/m².    Pain disability index:      11/5/2024     8:17 AM 9/24/2024     1:10 PM   Last 3 PDI Scores   Pain Disability Index (PDI) 39 48       Gen: NAD  Psych: mood appropriate for given condition  HEENT: eyes anicteric   CV: RRR, 2+ radial pulse  Respiratory: non-labored, no signs of respiratory distress  Abd: non-distended  Skin: warm, dry and intact.  Gait: Able to heel walk, toe walk. No antalgic gait.     Coordination:   Tandem walking coordination: normal    Cervical spine: ROM is full in flexion, extension and lateral rotation without increased pain.  Spurling's maneuver causes no neck pain to either side.  Myofascial exam: No  Tenderness to palpation across cervical paraspinous region bilaterally.    Lumbar spine:  Lumbar spine: ROM is full with flexion extension and oblique extension with no increased pain.    Riley's test causes no increased pain on either side.    Supine straight leg raise is negative bilaterally.    Internal and external rotation of the hip causes no increased pain on either side.  Myofascial exam: No tenderness to palpation across lumbar paraspinous muscles. No tenderness to palpation over the bilateral greater trochanters and bilateral SI joint    Sensory:  Intact and symmetrical to light touch in C4-T1 dermatomes bilaterally. Intact and symmetrical to light touch in L1-S1 dermatomes bilaterally.    Motor:    Right Left   C4 Shoulder Abduction  5  5   C5 Elbow Flexion    5  5   C6 Wrist Extension  5  5   C7 Elbow Extension   5  5   C8/T1 Hand Intrinsics   5  5        Right Left   L2/3 Iliacus Hip flexion  5  5   L3/4 Qudratus Femoris Knee Extension  5  5   L4/5 Tib Anterior Ankle Dorsiflexion   5  5   L5/S1 Extensor Hallicus Longus Great toe extension  5  5   S1/S2 Gastroc/Soleus Plantar Flexion  5  5      Right Left   Triceps DTR 2+ 2+   Biceps DTR 2+ 2+   Brachioradialis DTR 2+ 2+   Patellar DTR 2+ 2+   Achilles DTR 2+ 2+   Marroquin Absent  Absent   Clonus Absent Absent   Babinski Absent Absent       Imaging:    Xray lumbar spine 9-24-24:  Vertebral bodies are normal in height without evidence of fracture.  Normal sagittal alignment is preserved.  No spondylolisthesis.  No abnormal translation with flexion and extension.  Multilevel disc degeneration with mild intervertebral disc space narrowing degenerative marginal osteophyte formation, most pronounced at T11-12, T12-L1 and L3-4 through L5-S1.Multilevel facet arthropathy.  Impression:  Multilevel mild degenerative changes of the thoracolumbar spine without evidence of an acute abnormality.      Assessment:   Chel Thompson is a 63 y.o. year old  female patient who has a past medical history of Hypertension, Hypertensive heart disease without heart failure, Pancreatitis, and UTI (urinary tract infection). She presents for lower back and right thigh pain.    Right lower back resolved with PT.  Right posterior lateral thigh pain- differential includes myofascial/mechanical pain, versus early lumbar radiculopathy due to degenerative changes and/or disc hernia.  Degenerative changes noted on lumbar xray throughout. No focal neurological deficits.    Plan:  - to help with pain, continue with PT and home exercise  - to further assess, we will obtain MRI lumbar spine and follow up after  - can consider interventional procedures after review of MRI.     Problem List Items Addressed This Visit    None  Visit Diagnoses       Lumbar radiculopathy, chronic    -  Primary    Relevant Orders    MRI Lumbar Spine Without Contrast    Lumbar back pain                Follow Up: RTC after imaging    : Reviewed and consistent with medication use as prescribed.          Shannan Tellez PA-C  [facility]

## 2024-11-07 ENCOUNTER — CLINICAL SUPPORT (OUTPATIENT)
Dept: REHABILITATION | Facility: HOSPITAL | Age: 64
End: 2024-11-07
Payer: COMMERCIAL

## 2024-11-07 DIAGNOSIS — M25.69 DECREASED RANGE OF MOTION OF TRUNK AND BACK: ICD-10-CM

## 2024-11-07 DIAGNOSIS — R53.1 WEAKNESS: Primary | ICD-10-CM

## 2024-11-07 PROCEDURE — 97112 NEUROMUSCULAR REEDUCATION: CPT | Mod: PN

## 2024-11-07 PROCEDURE — 97140 MANUAL THERAPY 1/> REGIONS: CPT | Mod: PN

## 2024-11-07 PROCEDURE — 97110 THERAPEUTIC EXERCISES: CPT | Mod: PN

## 2024-11-07 NOTE — PROGRESS NOTES
"[facility]   Physical Therapy Treatment Note      Name: Chel Thomas [place]  Clinic Number: [id number]    Therapy Diagnosis:   Encounter Diagnoses   Name Primary?    Weakness Yes    Decreased range of motion of trunk and back          Physician: Shannan Tellez PA-C    Visit Date: 11/7/2024    Physician Orders: PT Eval and Treat   Medical Diagnosis from Referral:  Lumbar back pain [M54.50], Lumbar radiculopathy [M54.16]   Evaluation Date: 9/30/2024  Authorization Period Expiration: 12/31/2024  Plan of Care Expiration: 11/11/2024  Progress Note Due: 10/30/2024  Visit # / Visits authorized: 10/ 20  FOTO: 2/3     Precautions: Standard     PTA Visit #: 1/5     Time In: 9:00 am   Time Out: 10:00 am   Total Billable Time: 60 minutes (partially 1:1 with trained technician)     Subjective     Pt reports:she did have burning pain in R thigh last night after work and is planning to have MRI done.  She was compliant with home exercise program.  Response to previous treatment: improved symptoms   Functional change: n/a today     Pain: 1/10  Location: R thigh/ hip     Objective      Objective Measures updated at progress report unless specified.     Treatment     Chel received the following treatments:      CPT Intervention  JointFocus Duration / Intensity  11/7/2024 Duration / Intensity  11/4/2024 Duration / Intensity  10/31/2024   TE Nustep 6 minutes  6 minutes  6 minutes    NMR Sciatic Nerve glides  X 15  X 15      TE Hamstring stretch long sit  Supine into IT band stretch 5 x 10"  - 3 x 20"    TE  Piriformis stretch  5 x 10" holds  rotation 5 x 10" holds + rotation 5 x 10" holds   NMR BKFO with TA activation  X 20 R LE BTB       NMR Bird Dog  X 10 B  10 B      NMR Dead Bugs 3 x 20 3 x 20 3 x 20   NMR Table Top Holds  X 5 15" holds  X5 10" holds  X5 10" holds    NMR  Bridges over swiss ball  X 30  X 30 X30   NMR SL clamshells  - 2 x 15 5" holds GTB 2 x 15 5" holds GTB   NMR Mod side planks  " "4 x 10" holds       NMR SLR  Hip ABCs x 2 sets R  Hip ABCs x 2 sets R  2 x 12   TE Cat/ cow  X 20 20 X 15    NMR Mod planks 5 x 10" holds - X10 10" plank holds    NMR Pallof Press  X 15 30# B 8"  X 15 30# B X 15 30# B    TE Lumbar Extension Machine  - 62# 2 x 10  62# 2 x 10    TA Weighted fwd carry 15# ball 2 laps turf   10# ball 2 laps turf     MT STM to B lumbar paraspinals, R glute medius and piriformis and IT band 8' NP NP   PLAN              After exs., pt rec'd functional dry needling today as follows: FDN to R glute medius and piriformis using 100 mm fusiform needles.  After placement of needles, needles were left in-situ for 8 minutes without manipulation.     CPT Codes available for Billing:   (8) minutes of Manual therapy (MT) to improve pain and ROM.  (12) minutes of Therapeutic Exercise (TE) to develop strength, endurance, range of motion, and flexibility.  (40) minutes of Neuromuscular Re-Education (NMR)  to improve: Balance, Coordination, Kinesthetic, Sense, Proprioception, and Posture.  (-) minutes of Therapeutic Activities (TA) to improve functional performance.  (-) minutes of Gait Training (GT) to improve functional mobility and safety  Unattended Electrical Stimulation (ES) for muscle performance or pain modulation.  Vasopneumatic Device Therapy () for management of swelling/edema. (07712)  BFR: Blood flow restriction applied during exercise  NP or (-): Not Performed  Patient Education and Home Exercises       Education provided:   - explanation and justification of treatments performed     Written Home Exercises Provided: Patient instructed to cont prior HEP. Exercises were reviewed and Chel was able to demonstrate them prior to the end of the session.  Chel demonstrated good  understanding of the education provided. See EMR under Patient Instructions for exercises provided during therapy sessions.    Assessment   Treatment continued focusing on neuromotor control redevelopment at trunk and " lower extremities with progressions of stability work with both static and dynamic challenges. FDN needling and manual was done at start of session as reset prior to exercises. Pt tolerates treatments well following this. Dizziness did not onset at all during today session. Pt notes she has noticed bad nights are after long days at work, so we will continue to focus on developing more functional core stability with dynamic tasks.     Chel Is progressing well towards her goals.   Pt prognosis is Good.     Pt will continue to benefit from skilled outpatient physical therapy to address the deficits listed in the problem list box on initial evaluation, provide pt/family education and to maximize pt's level of independence in the home and community environment.     Pt's spiritual, cultural and educational needs considered and pt agreeable to plan of care and goals.     Anticipated barriers to physical therapy:  chronicity of condition     Goals:   Short Term Goals:  3 weeks Progress   9/30/2024   Pain: Pt will demonstrate improved pain by reports of less than or equal to 6/10 worst pain on the verbal rating scale in order to progress toward maximal functional ability and improve QOL. PC   HEP: Patient will demonstrate independence with HEP in order to progress toward functional independence. PC      Long Term Goals:  6 weeks Progress  9/30/2024   Pain: Pt will demonstrate improved pain by reports of less than or equal to 3/10 worst pain on the verbal rating scale in order to progress toward maximal functional ability and improve QOL.   PC   Function: Patient will demonstrate improved function as indicated by a functional score of greater than or equal to 75 out of 100 on FOTO. PC   Mobility: Patient will improve AROM to stated goals, listed in objective measures above, in order to return to maximal functional potential and improve quality of life. PC   Strength: Patient will improve strength to stated goals, listed in  objective measures above, in order to improve functional independence and quality of life. PC   GOALS KEY:   PC= progressing/continue;   PM= partially met;             DC= discontinue    Plan     Plan of care Certification: 9/30/2024 to 11/11/2024.     Outpatient Physical Therapy 2 times weekly for 6 weeks to include the following interventions: Cervical/Lumbar Traction, Electrical Stimulation with or without FDN, Gait Training, Manual Therapy, Moist Heat/ Ice, Neuromuscular Re-ed, Orthotic Management and Training, Patient Education, Self Care, Therapeutic Activities, Therapeutic Exercise, and Dry Needling .     Lainey Alonso, PT

## 2024-11-11 ENCOUNTER — CLINICAL SUPPORT (OUTPATIENT)
Dept: REHABILITATION | Facility: HOSPITAL | Age: 64
End: 2024-11-11
Payer: COMMERCIAL

## 2024-11-11 DIAGNOSIS — R53.1 WEAKNESS: Primary | ICD-10-CM

## 2024-11-11 DIAGNOSIS — M25.69 DECREASED RANGE OF MOTION OF TRUNK AND BACK: ICD-10-CM

## 2024-11-11 PROCEDURE — 97110 THERAPEUTIC EXERCISES: CPT | Mod: PN

## 2024-11-11 PROCEDURE — 97112 NEUROMUSCULAR REEDUCATION: CPT | Mod: PN

## 2024-11-11 NOTE — PROGRESS NOTES
"[facility]   Physical Therapy Treatment Note      Name: Chel Thomas [place]  Clinic Number: [id number]    Therapy Diagnosis:   Encounter Diagnoses   Name Primary?    Weakness Yes    Decreased range of motion of trunk and back          Physician: Shannan Tellez PA-C    Visit Date: 11/11/2024    Physician Orders: PT Eval and Treat   Medical Diagnosis from Referral:  Lumbar back pain [M54.50], Lumbar radiculopathy [M54.16]   Evaluation Date: 9/30/2024  Authorization Period Expiration: 12/31/2024  Plan of Care Expiration: 11/11/2024  Visit # / Visits authorized: 11/ 20  FOTO: 2/3     Precautions: Standard     PTA Visit #: 1/5     Time In: 9:00 am   Time Out: 10:00 am   Total Billable Time: 45 minutes billed of 1:1 treatment     Subjective     Pt reports:she continues to have significant pain up to 7/10 at night after working all day. She is feeling okay now though, but she remains with diminished sensation in the R lateral thigh.   She was compliant with home exercise program.  Response to previous treatment: improved symptoms   Functional change: n/a today     Pain: 2/10  Location: R thigh/ low back      Objective      Objective Measures updated at progress report unless specified.     Treatment     Chel received the following treatments:      CPT Intervention  JointFocus Duration / Intensity  11/11/2024 Duration / Intensity  11/7/2024 Duration / Intensity  11/4/2024   TE Nustep 6 minutes  6 minutes  6 minutes    NMR Sciatic Nerve glides  X 15  X 15  X 15    TE  Piriformis stretch  5 x 10" holds  5 x 10" holds  rotation 5 x 10" holds   NMR BKFO with TA activation  X 20 R LE BTB  X 20 R LE BTB     NMR Bird Dog  X 10 B  X 10 B  10 B    NMR Dead Bugs 3 x 30 3 x 20 3 x 20   NMR Table Top Holds  3 x 30" holds  X 5 15" holds  X5 10" holds    NMR  Bridges over swiss ball  X30 X 30  X 30   NMR Mod side planks  4 x 10" holds  4 x 10" holds     NMR SLR  Hip ABCs x 2 sets R  Hip ABCs x 2 sets " "R  Hip ABCs x 2 sets R    TE Cat/ cow  X 20 X 20 20   NMR SL hip abd holds  X 8 10" holds B        NMR Mod planks 5 x 10" holds 5 x 10" holds -   NMR Pallof Press  X 15 30# B 8"  X 15 30# B 8"  X 15 30# B   TE Lumbar Extension Machine  62# 2 x 10  - 62# 2 x 10    TA Weighted fwd carry 15# ball 2 laps turf  15# ball 2 laps turf   10# ball 2 laps turf   MT STM to B lumbar paraspinals, R glute medius and piriformis and IT band NP 8' NP   PLAN             CPT Codes available for Billing:   (-) minutes of Manual therapy (MT) to improve pain and ROM.  (15) minutes of Therapeutic Exercise (TE) to develop strength, endurance, range of motion, and flexibility.  (30) minutes of Neuromuscular Re-Education (NMR)  to improve: Balance, Coordination, Kinesthetic, Sense, Proprioception, and Posture.  (-) minutes of Therapeutic Activities (TA) to improve functional performance.  (-) minutes of Gait Training (GT) to improve functional mobility and safety  Unattended Electrical Stimulation (ES) for muscle performance or pain modulation.  Vasopneumatic Device Therapy () for management of swelling/edema. (37492)  BFR: Blood flow restriction applied during exercise  NP or (-): Not Performed  Patient Education and Home Exercises       Education provided:   - explanation and justification of treatments performed     Written Home Exercises Provided: Patient instructed to cont prior HEP. Exercises were reviewed and Chel was able to demonstrate them prior to the end of the session.  Chel demonstrated good  understanding of the education provided. See EMR under Patient Instructions for exercises provided during therapy sessions.    Assessment   Treatment continued focusing on neuromotor control redevelopment at trunk and lower extremities with progressions of stability work with both static and dynamic challenges. Pt tolerates treatments well, but remains with noted increased difficulty with prolonged holds causing shaking and fatigue. " Pain was not reproduced during session today. Plan to reassess next session.     Chel Is progressing well towards her goals.   Pt prognosis is Good.     Pt will continue to benefit from skilled outpatient physical therapy to address the deficits listed in the problem list box on initial evaluation, provide pt/family education and to maximize pt's level of independence in the home and community environment.     Pt's spiritual, cultural and educational needs considered and pt agreeable to plan of care and goals.     Anticipated barriers to physical therapy:  chronicity of condition     Goals:   Short Term Goals:  3 weeks Progress   9/30/2024   Pain: Pt will demonstrate improved pain by reports of less than or equal to 6/10 worst pain on the verbal rating scale in order to progress toward maximal functional ability and improve QOL. PC   HEP: Patient will demonstrate independence with HEP in order to progress toward functional independence. PC      Long Term Goals:  6 weeks Progress  9/30/2024   Pain: Pt will demonstrate improved pain by reports of less than or equal to 3/10 worst pain on the verbal rating scale in order to progress toward maximal functional ability and improve QOL.   PC   Function: Patient will demonstrate improved function as indicated by a functional score of greater than or equal to 75 out of 100 on FOTO. PC   Mobility: Patient will improve AROM to stated goals, listed in objective measures above, in order to return to maximal functional potential and improve quality of life. PC   Strength: Patient will improve strength to stated goals, listed in objective measures above, in order to improve functional independence and quality of life. PC   GOALS KEY:   PC= progressing/continue;   PM= partially met;             DC= discontinue    Plan     Plan of care Certification: 9/30/2024 to 11/11/2024.     Outpatient Physical Therapy 2 times weekly for 6 weeks to include the following interventions:  Cervical/Lumbar Traction, Electrical Stimulation with or without FDN, Gait Training, Manual Therapy, Moist Heat/ Ice, Neuromuscular Re-ed, Orthotic Management and Training, Patient Education, Self Care, Therapeutic Activities, Therapeutic Exercise, and Dry Needling .     Lainey Alonso, PT

## 2024-11-14 ENCOUNTER — CLINICAL SUPPORT (OUTPATIENT)
Dept: REHABILITATION | Facility: HOSPITAL | Age: 64
End: 2024-11-14
Payer: COMMERCIAL

## 2024-11-14 DIAGNOSIS — R53.1 WEAKNESS: Primary | ICD-10-CM

## 2024-11-14 DIAGNOSIS — M25.69 DECREASED RANGE OF MOTION OF TRUNK AND BACK: ICD-10-CM

## 2024-11-14 PROCEDURE — 97530 THERAPEUTIC ACTIVITIES: CPT | Mod: PN

## 2024-11-14 PROCEDURE — 97110 THERAPEUTIC EXERCISES: CPT | Mod: PN

## 2024-11-14 PROCEDURE — 97112 NEUROMUSCULAR REEDUCATION: CPT | Mod: PN

## 2024-11-14 NOTE — PLAN OF CARE
[facility]  Physical Therapy Discharge Note    Name: Chel Thomas [place]  Clinic Number: [id number]    Therapy Diagnosis:   Encounter Diagnoses   Name Primary?    Weakness Yes    Decreased range of motion of trunk and back      Physician: Shannan Tellez PA-C    Physician Orders: PT Eval and Treat   Medical Diagnosis from Referral:  Lumbar back pain [M54.50], Lumbar radiculopathy [M54.16]   Evaluation Date: 9/30/2024  Authorization Period Expiration: 12/31/2024  Plan of Care Expiration: 11/11/2024  Visit # / Visits authorized: 11/ 20  FOTO: 2/3      Precautions: Standard      PTA Visit #: 1/5      Time In: 9:00 am   Time Out: 9:55 am   Total Billable Time: 55 minutes  partially 1:1 with technician     Subjective      Pt reports: overall pt has noticed increased strength and stamina since starting therapy with more come and go nature of pain. However, patient still does get pain reaching up to 8/10 after a long day of work.   She was partially compliant with home exercise program.  Response to previous treatment: improved symptoms   Functional change: better stamina at work, no pain on days not working      Pain: 1/10  Location: R thigh/ low back       Objective       RANGE OF MOTION:     Lumbar AROM   (% of norm ROM present)  Right  (spine) Left    Pain/Dysfunction with Movement 11/14 Goal   Lumbar Flexion  75% --- Pain reproduction of R side 100% no p! 100%   Lumbar Extension  75% --- Pain reproduction in R side 7%% no pain 100%   Lumbar Side Bending  75% 75% Pulling in L side with RSB,pain increase in R thigh with L % no pain B  Min to no pain B       STRENGTH:     L/E MMT Right Left Pain/Dysfunction with Movement 11/14 Goal   Hip Flexion  4/5 4/5 --- 5/5 B 5/5 B   Hip Extension  4-/5 4-/5 Pain reproduction B  4+/5 B 4+/5 B   Hip Abduction  4-/5 4-/5 Pain reproduction B  5/5 B 4+/5 B   Knee Flexion 4/5 4/5 Pain reproduction in lateral thighs B 5/5 B  5/5 B   Hip ER 4/5 4/5  "--- 5/5 B 5/5 B      Treatment      Chel received the following treatments:      CPT Intervention  JointFocus Duration / Intensity  11/14/2024 Duration / Intensity  11/11/2024 Duration / Intensity  11/7/2024   TE Nustep 6 minutes 6 minutes  6 minutes    NMR Sciatic Nerve glides  X15 R X 15  X 15    NMR Dead Bugs 3 x 30 3 x 30 3 x 20   NMR Table Top Holds  3 x 30" holds  3 x 30" holds  X 5 15" holds    NMR  Bridges over swiss ball  X 30 X30 X 30    NMR Mod side planks  4  x 10" holds 4 x 10" holds  4 x 10" holds   NMR SLR  Hip ABCs x 2 sets R  Hip ABCs x 2 sets R  Hip ABCs x 2 sets R    TE Cat/ cow  X 20 X 20 X 20   NMR SL hip abd holds  X 8 10" holds B  X 8 10" holds B      NMR Mod planks 5x 10" holds 5 x 10" holds 5 x 10" holds   NMR Pallof Press  X15 40# B 8" X 15 30# B 8"  X 15 30# B 8"    TE Lumbar Extension Machine  64# 2x10 62# 2 x 10  -   TA Weighted fwd carry 15# ball 2lap 15# ball 2 laps turf  15# ball 2 laps turf    TA HEP education for self progression and modification at home 5'     PLAN              CPT Codes available for Billing:   (-) minutes of Manual therapy (MT) to improve pain and ROM.  (15) minutes of Therapeutic Exercise (TE) to develop strength, endurance, range of motion, and flexibility.  (32) minutes of Neuromuscular Re-Education (NMR)  to improve: Balance, Coordination, Kinesthetic, Sense, Proprioception, and Posture.  (8) minutes of Therapeutic Activities (TA) to improve functional performance.  (-) minutes of Gait Training (GT) to improve functional mobility and safety  Unattended Electrical Stimulation (ES) for muscle performance or pain modulation.  Vasopneumatic Device Therapy () for management of swelling/edema. (08920)  BFR: Blood flow restriction applied during exercise  NP or (-): Not Performed  Patient Education and Home Exercises        Education provided:   - explanation and justification of treatments performed      Written Home Exercises Provided: Patient instructed to " cont prior HEP. Exercises were reviewed and Chel was able to demonstrate them prior to the end of the session.  Chel demonstrated good  understanding of the education provided. See EMR under Patient Instructions for exercises provided during therapy sessions.    ASSESSMENT    Chel Thompson is a 63 y.o. female who has completed 12 sessions of formal outpatient physical therapy for treatment of Lumbar back pain and radiculopathy. Patient has since shown significant improvement in lower extremity strength and trunk mobility, allowing for better function with standing, walking, bending and twisting for activities of daily living.  FOTO scores assessed today show Chel Thompson's overall functional status has remained the same with physical therapy treatment. Despite minor improvements patient verbally noted in consistent presence of pain, strength and stamina. Residual limitations will continue to be addressed with home exercise program. Patient demonstrates independence with home program prior to discharge for safe transition to independent phase of care. Patient educated to follow up with MD and currently has MRI scheduled to address symptoms that remain.     Discharge reason: Patient has reached the maximum rehab potential for the present time    Discharge FOTO Score:     FOTO Lumbar Survey    Therapist reviewed FOTO scores for Chel Thompson on 11/14/2024.   FOTO documents entered into [employer] - see Media section.    Score: 60          Goals:   Short Term Goals:  3 weeks Progress   11/14/2024   Pain: Pt will demonstrate improved pain by reports of less than or equal to 6/10 worst pain on the verbal rating scale in order to progress toward maximal functional ability and improve QOL. Not Met   HEP: Patient will demonstrate independence with HEP in order to progress toward functional independence. MET      Long Term Goals:  6 weeks Progress  11/14/2024   Pain: Pt will demonstrate  improved pain by reports of less than or equal to 3/10 worst pain on the verbal rating scale in order to progress toward maximal functional ability and improve QOL.   Not Met   Function: Patient will demonstrate improved function as indicated by a functional score of greater than or equal to 75 out of 100 on FOTO. Not Met   Mobility: Patient will improve AROM to stated goals, listed in objective measures above, in order to return to maximal functional potential and improve quality of life. MET   Strength: Patient will improve strength to stated goals, listed in objective measures above, in order to improve functional independence and quality of life. MET   GOALS KEY:   PC= progressing/continue;   PM= partially met;             DC= discontinue    PLAN   This patient is discharged from Physical Therapy      Lainey Alonso, PT

## 2024-11-14 NOTE — PROGRESS NOTES
[facility]  Physical Therapy Discharge Note    Name: Chel Thomas [place]  Clinic Number: [id number]    Therapy Diagnosis:   Encounter Diagnoses   Name Primary?    Weakness Yes    Decreased range of motion of trunk and back      Physician: Shannan Tellez PA-C    Physician Orders: PT Eval and Treat   Medical Diagnosis from Referral:  Lumbar back pain [M54.50], Lumbar radiculopathy [M54.16]   Evaluation Date: 9/30/2024  Authorization Period Expiration: 12/31/2024  Plan of Care Expiration: 11/11/2024  Visit # / Visits authorized: 11/ 20  FOTO: 2/3      Precautions: Standard      PTA Visit #: 1/5      Time In: 9:00 am   Time Out: 9:55 am   Total Billable Time: 55 minutes  partially 1:1 with technician     Subjective      Pt reports: overall pt has noticed increased strength and stamina since starting therapy with more come and go nature of pain. However, patient still does get pain reaching up to 8/10 after a long day of work.   She was partially compliant with home exercise program.  Response to previous treatment: improved symptoms   Functional change: better stamina at work, no pain on days not working      Pain: 1/10  Location: R thigh/ low back       Objective       RANGE OF MOTION:     Lumbar AROM   (% of norm ROM present)  Right  (spine) Left    Pain/Dysfunction with Movement 11/14 Goal   Lumbar Flexion  75% --- Pain reproduction of R side 100% no p! 100%   Lumbar Extension  75% --- Pain reproduction in R side 7%% no pain 100%   Lumbar Side Bending  75% 75% Pulling in L side with RSB,pain increase in R thigh with L % no pain B  Min to no pain B       STRENGTH:     L/E MMT Right Left Pain/Dysfunction with Movement 11/14 Goal   Hip Flexion  4/5 4/5 --- 5/5 B 5/5 B   Hip Extension  4-/5 4-/5 Pain reproduction B  4+/5 B 4+/5 B   Hip Abduction  4-/5 4-/5 Pain reproduction B  5/5 B 4+/5 B   Knee Flexion 4/5 4/5 Pain reproduction in lateral thighs B 5/5 B  5/5 B   Hip ER 4/5 4/5  "--- 5/5 B 5/5 B      Treatment      Chel received the following treatments:      CPT Intervention  [employer] Duration / Intensity  11/14/2024 Duration / Intensity  11/11/2024 Duration / Intensity  11/7/2024   TE Nustep 6 minutes 6 minutes  6 minutes    NMR Sciatic Nerve glides  X15 R X 15  X 15    NMR Dead Bugs 3 x 30 3 x 30 3 x 20   NMR Table Top Holds  3 x 30" holds  3 x 30" holds  X 5 15" holds    NMR  Bridges over swiss ball  X 30 X30 X 30    NMR Mod side planks  4  x 10" holds 4 x 10" holds  4 x 10" holds   NMR SLR  Hip ABCs x 2 sets R  Hip ABCs x 2 sets R  Hip ABCs x 2 sets R    TE Cat/ cow  X 20 X 20 X 20   NMR SL hip abd holds  X 8 10" holds B  X 8 10" holds B      NMR Mod planks 5x 10" holds 5 x 10" holds 5 x 10" holds   NMR Pallof Press  X15 40# B 8" X 15 30# B 8"  X 15 30# B 8"    TE Lumbar Extension Machine  64# 2x10 62# 2 x 10  -   TA Weighted fwd carry 15# ball 2lap 15# ball 2 laps turf  15# ball 2 laps turf    TA HEP education for self progression and modification at home 5'     PLAN              CPT Codes available for Billing:   (-) minutes of Manual therapy (MT) to improve pain and ROM.  (15) minutes of Therapeutic Exercise (TE) to develop strength, endurance, range of motion, and flexibility.  (32) minutes of Neuromuscular Re-Education (NMR)  to improve: Balance, Coordination, Kinesthetic, Sense, Proprioception, and Posture.  (8) minutes of Therapeutic Activities (TA) to improve functional performance.  (-) minutes of Gait Training (GT) to improve functional mobility and safety  Unattended Electrical Stimulation (ES) for muscle performance or pain modulation.  Vasopneumatic Device Therapy () for management of swelling/edema. (68263)  BFR: Blood flow restriction applied during exercise  NP or (-): Not Performed  Patient Education and Home Exercises        Education provided:   - explanation and justification of treatments performed      Written Home Exercises Provided: Patient instructed to " cont prior HEP. Exercises were reviewed and Chel was able to demonstrate them prior to the end of the session.  Chel demonstrated good  understanding of the education provided. See EMR under Patient Instructions for exercises provided during therapy sessions.    ASSESSMENT    Chel Thompson is a 63 y.o. female who has completed 12 sessions of formal outpatient physical therapy for treatment of Lumbar back pain and radiculopathy. Patient has since shown significant improvement in lower extremity strength and trunk mobility, allowing for better function with standing, walking, bending and twisting for activities of daily living.  FOTO scores assessed today show Chel Thompson's overall functional status has remained the same with physical therapy treatment. Despite minor improvements patient verbally noted in consistent presence of pain, strength and stamina. Residual limitations will continue to be addressed with home exercise program. Patient demonstrates independence with home program prior to discharge for safe transition to independent phase of care. Patient educated to follow up with MD and currently has MRI scheduled to address symptoms that remain.     Discharge reason: Patient has reached the maximum rehab potential for the present time    Discharge FOTO Score:     FOTO Lumbar Survey    Therapist reviewed FOTO scores for Chel Thompson on 11/14/2024.   FOTO documents entered into [employer] - see Media section.    Score: 60          Goals:   Short Term Goals:  3 weeks Progress   11/14/2024   Pain: Pt will demonstrate improved pain by reports of less than or equal to 6/10 worst pain on the verbal rating scale in order to progress toward maximal functional ability and improve QOL. Not Met   HEP: Patient will demonstrate independence with HEP in order to progress toward functional independence. MET      Long Term Goals:  6 weeks Progress  11/14/2024   Pain: Pt will demonstrate  improved pain by reports of less than or equal to 3/10 worst pain on the verbal rating scale in order to progress toward maximal functional ability and improve QOL.   Not Met   Function: Patient will demonstrate improved function as indicated by a functional score of greater than or equal to 75 out of 100 on FOTO. Not Met   Mobility: Patient will improve AROM to stated goals, listed in objective measures above, in order to return to maximal functional potential and improve quality of life. MET   Strength: Patient will improve strength to stated goals, listed in objective measures above, in order to improve functional independence and quality of life. MET   GOALS KEY:   PC= progressing/continue;   PM= partially met;             DC= discontinue    PLAN   This patient is discharged from Physical Therapy      Lainey Alonso, PT

## 2024-11-14 NOTE — PROGRESS NOTES
[facility]  Physical Therapy Discharge Note    Name: Chel Thomas [place]  Clinic Number: [id number]    Therapy Diagnosis:   Encounter Diagnoses   Name Primary?    Weakness Yes    Decreased range of motion of trunk and back      Physician: Shannan Tellez PA-C    Physician Orders: PT Eval and Treat   Medical Diagnosis from Referral:  Lumbar back pain [M54.50], Lumbar radiculopathy [M54.16]   Evaluation Date: 9/30/2024  Authorization Period Expiration: 12/31/2024  Plan of Care Expiration: 11/11/2024  Visit # / Visits authorized: 11/ 20  FOTO: 2/3      Precautions: Standard      PTA Visit #: 1/5      Time In: 9:00 am   Time Out: 9:55 am   Total Billable Time: 55 minutes  partially 1:1 with technician     Subjective      Pt reports: overall pt has noticed increased strength and stamina since starting therapy with more come and go nature of pain. However, patient still does get pain reaching up to 8/10 after a long day of work.   She was partially compliant with home exercise program.  Response to previous treatment: improved symptoms   Functional change: better stamina at work, no pain on days not working      Pain: 1/10  Location: R thigh/ low back       Objective       RANGE OF MOTION:     Lumbar AROM   (% of norm ROM present)  Right  (spine) Left    Pain/Dysfunction with Movement 11/14 Goal   Lumbar Flexion  75% --- Pain reproduction of R side 100% no p! 100%   Lumbar Extension  75% --- Pain reproduction in R side 7%% no pain 100%   Lumbar Side Bending  75% 75% Pulling in L side with RSB,pain increase in R thigh with L % no pain B  Min to no pain B       STRENGTH:     L/E MMT Right Left Pain/Dysfunction with Movement 11/14 Goal   Hip Flexion  4/5 4/5 --- 5/5 B 5/5 B   Hip Extension  4-/5 4-/5 Pain reproduction B  4+/5 B 4+/5 B   Hip Abduction  4-/5 4-/5 Pain reproduction B  5/5 B 4+/5 B   Knee Flexion 4/5 4/5 Pain reproduction in lateral thighs B 5/5 B  5/5 B   Hip ER 4/5 4/5  "--- 5/5 B 5/5 B      Treatment      Chel received the following treatments:      CPT Intervention  JointFocus Duration / Intensity  11/14/2024 Duration / Intensity  11/11/2024 Duration / Intensity  11/7/2024   TE Nustep 6 minutes 6 minutes  6 minutes    NMR Sciatic Nerve glides  X15 R X 15  X 15    NMR Dead Bugs 3 x 30 3 x 30 3 x 20   NMR Table Top Holds  3 x 30" holds  3 x 30" holds  X 5 15" holds    NMR  Bridges over swiss ball  X 30 X30 X 30    NMR Mod side planks  4  x 10" holds 4 x 10" holds  4 x 10" holds   NMR SLR  Hip ABCs x 2 sets R  Hip ABCs x 2 sets R  Hip ABCs x 2 sets R    TE Cat/ cow  X 20 X 20 X 20   NMR SL hip abd holds  X 8 10" holds B  X 8 10" holds B      NMR Mod planks 5x 10" holds 5 x 10" holds 5 x 10" holds   NMR Pallof Press  X15 40# B 8" X 15 30# B 8"  X 15 30# B 8"    TE Lumbar Extension Machine  64# 2x10 62# 2 x 10  -   TA Weighted fwd carry 15# ball 2lap 15# ball 2 laps turf  15# ball 2 laps turf    TA HEP education for self progression and modification at home 5'     PLAN              CPT Codes available for Billing:   (-) minutes of Manual therapy (MT) to improve pain and ROM.  (15) minutes of Therapeutic Exercise (TE) to develop strength, endurance, range of motion, and flexibility.  (32) minutes of Neuromuscular Re-Education (NMR)  to improve: Balance, Coordination, Kinesthetic, Sense, Proprioception, and Posture.  (8) minutes of Therapeutic Activities (TA) to improve functional performance.  (-) minutes of Gait Training (GT) to improve functional mobility and safety  Unattended Electrical Stimulation (ES) for muscle performance or pain modulation.  Vasopneumatic Device Therapy () for management of swelling/edema. (26417)  BFR: Blood flow restriction applied during exercise  NP or (-): Not Performed  Patient Education and Home Exercises        Education provided:   - explanation and justification of treatments performed      Written Home Exercises Provided: Patient instructed to " cont prior HEP. Exercises were reviewed and Chel was able to demonstrate them prior to the end of the session.  Chel demonstrated good  understanding of the education provided. See EMR under Patient Instructions for exercises provided during therapy sessions.    ASSESSMENT    Chel Thompson is a 63 y.o. female who has completed 12 sessions of formal outpatient physical therapy for treatment of Lumbar back pain and radiculopathy. Patient has since shown significant improvement in lower extremity strength and trunk mobility, allowing for better function with standing, walking, bending and twisting for activities of daily living.  FOTO scores assessed today show Chel Thompson's overall functional status has remained the same with physical therapy treatment. Despite minor improvements patient verbally noted in consistent presence of pain, strength and stamina. Residual limitations will continue to be addressed with home exercise program. Patient demonstrates independence with home program prior to discharge for safe transition to independent phase of care. Patient educated to follow up with MD and currently has MRI scheduled to address symptoms that remain.     Discharge reason: Patient has reached the maximum rehab potential for the present time    Discharge FOTO Score:     FOTO Lumbar Survey    Therapist reviewed FOTO scores for Chel Thompson on 11/14/2024.   FOTO documents entered into [employer] - see Media section.    Score: 60          Goals:   Short Term Goals:  3 weeks Progress   11/14/2024   Pain: Pt will demonstrate improved pain by reports of less than or equal to 6/10 worst pain on the verbal rating scale in order to progress toward maximal functional ability and improve QOL. Not Met   HEP: Patient will demonstrate independence with HEP in order to progress toward functional independence. MET      Long Term Goals:  6 weeks Progress  11/14/2024   Pain: Pt will demonstrate  improved pain by reports of less than or equal to 3/10 worst pain on the verbal rating scale in order to progress toward maximal functional ability and improve QOL.   Not Met   Function: Patient will demonstrate improved function as indicated by a functional score of greater than or equal to 75 out of 100 on FOTO. Not Met   Mobility: Patient will improve AROM to stated goals, listed in objective measures above, in order to return to maximal functional potential and improve quality of life. MET   Strength: Patient will improve strength to stated goals, listed in objective measures above, in order to improve functional independence and quality of life. MET   GOALS KEY:   PC= progressing/continue;   PM= partially met;             DC= discontinue    PLAN   This patient is discharged from Physical Therapy      Lainey Alonso, PT

## 2024-11-18 ENCOUNTER — PATIENT MESSAGE (OUTPATIENT)
Dept: RADIOLOGY | Facility: HOSPITAL | Age: 64
End: 2024-11-18
Payer: COMMERCIAL

## 2024-11-19 ENCOUNTER — PATIENT MESSAGE (OUTPATIENT)
Dept: NEUROLOGY | Facility: CLINIC | Age: 64
End: 2024-11-19
Payer: COMMERCIAL

## 2024-11-19 ENCOUNTER — HOSPITAL ENCOUNTER (OUTPATIENT)
Dept: RADIOLOGY | Facility: HOSPITAL | Age: 64
Discharge: HOME | End: 2024-11-19
Attending: PHYSICIAN ASSISTANT
Payer: COMMERCIAL

## 2024-11-19 DIAGNOSIS — M54.16 LUMBAR RADICULOPATHY, CHRONIC: ICD-10-CM

## 2024-11-19 PROCEDURE — 72148 MRI LUMBAR SPINE W/O DYE: CPT | Mod: 26,,, | Performed by: RADIOLOGY

## 2024-11-19 PROCEDURE — 72148 MRI LUMBAR SPINE W/O DYE: CPT | Mod: TC,PO

## 2024-11-22 ENCOUNTER — OFFICE VISIT (OUTPATIENT)
Dept: PAIN MEDICINE | Facility: CLINIC | Age: 64
End: 2024-11-22
Payer: COMMERCIAL

## 2024-11-22 ENCOUNTER — TELEPHONE (OUTPATIENT)
Dept: FAMILY MEDICINE | Facility: CLINIC | Age: 64
End: 2024-11-22
Payer: COMMERCIAL

## 2024-11-22 VITALS
DIASTOLIC BLOOD PRESSURE: 105 MMHG | WEIGHT: 209.19 LBS | HEART RATE: 88 BPM | BODY MASS INDEX: 31.71 KG/M2 | HEIGHT: 68 IN | SYSTOLIC BLOOD PRESSURE: 147 MMHG

## 2024-11-22 DIAGNOSIS — K86.89 PANCREATIC MASS: Primary | ICD-10-CM

## 2024-11-22 DIAGNOSIS — M54.16 LUMBAR RADICULOPATHY, CHRONIC: Primary | ICD-10-CM

## 2024-11-22 DIAGNOSIS — M47.816 LUMBAR SPONDYLOSIS: ICD-10-CM

## 2024-11-22 PROCEDURE — 99999 PR PBB SHADOW E&M-EST. PATIENT-LVL III: CPT | Mod: PBBFAC,,, | Performed by: PHYSICIAN ASSISTANT

## 2024-11-22 NOTE — TELEPHONE ENCOUNTER
Dr. 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  Radiology rec MRI-please order or let me know what exactly to order

## 2024-11-22 NOTE — PROGRESS NOTES
[facility] Established Patient        PCP:  Yesica Burk MD    CC:   Chief Complaint   Patient presents with    Low-back Pain          HPI:   Chel Thompson is a 64 y.o. year old female patient who has a past medical history of Hypertension, Hypertensive heart disease without heart failure, Pancreatitis, and UTI (urinary tract infection). Ms. Milligan returns for follow up of lower back and right leg pain.  She has had recent MRI.  Recall, PT has helped lower back pain.  She continues with a burning numbness in the right right posterior/ lateral thigh.  Thigh sensation is mainly felt at night with layiing down to rest.  No changes from prior visit.    HPI 11/5/24:  She presents for follow up of lower back and right thigh pain.  She has been going to PT.  With PT and home exercise, lower back pain has resolved.  However, she has no improvement in and continues to experienced a burning numbness in the right right posterior/ lateral thigh.  Thigh sensation is mainly felt at night with layiing down to rest.  She did notice when she did work a second job one day, she did not have the pain that night. Pain is still bothersome enough that she wants to further evaluation.       HPI 9/24/24  She presents for lower back pain.  She has had pain for approximately 1 year.  Pain is increased over the last few weeks.  Pain is felt in the right lower back with radiation to the right posterolateral thigh stopping at the knee.  A few nights ago she also experienced left buttock and superior thigh pain.  She describes pain as burning, numb, sharp, stabbing.  Pain increases at night, while walking, while standing.  Pain improves with rest and elevating the legs.  She was seen in urgent care 09/13/2024 at which time she was treated with IM Toradol and given prescriptions for Mobic and Medrol.  She has completed the steroid without improvement.  She has not had any other treatments.    Denies bowel/ bladder  incontinence.    Past and current medications:  Antineuropathics:  NSAIDs: aleve, meloxicam  Antidepressants:  Muscle relaxers:  Opioids:  Antiplatelets/Anticoagulants:  Others:  tried and completed medrol taper    Physical Therapy/ Chiropractic care:  PT  - undergoing; resolution of back pain.  Continued right thigh pain.    Pain Intervention History:  none    Past Spine Surgical History:  none        History:    Current Outpatient Medications:     cyclobenzaprine (FLEXERIL) 5 MG tablet, Take 1 tablet (5 mg total) by mouth 3 (three) times daily as needed for Muscle spasms., Disp: 60 tablet, Rfl: 2    traZODone (DESYREL) 50 MG tablet, Take 1 tablet (50 mg total) by mouth every evening., Disp: 30 tablet, Rfl: 5    valsartan-hydrochlorothiazide (DIOVAN-HCT) 160-12.5 mg per tablet, Take 1 tablet by mouth every morning., Disp: , Rfl:     Past Medical History:   Diagnosis Date    Hypertension     Hypertensive heart disease without heart failure 02/04/2021    Pancreatitis     4 times (2009,2013)    UTI (urinary tract infection)        Past Surgical History:   Procedure Laterality Date    COLONOSCOPY N/A 8/22/2024    Procedure: COLONOSCOPY;  Surgeon: Kya Garcia MD;  Location: [facility];  Service: Endoscopy;  Laterality: N/A;    DILATION AND CURETTAGE OF UTERUS      MULTIPLE TOOTH EXTRACTIONS      vaginal delivery      varicose veins         Family History   Problem Relation Name Age of Onset    Asthma Mother      Hypertension Mother      COPD Brother 3     Cancer Maternal Grandmother      Cancer Maternal Grandfather         Social History     Socioeconomic History    Marital status:      Spouse name: Shen Thompson    Number of children: 1   Tobacco Use    Smoking status: Never    Smokeless tobacco: Never   Substance and Sexual Activity    Alcohol use: Yes     Comment: occasionally    Drug use: No    Sexual activity: Yes     Partners: Male     Social Drivers of Health     Financial Resource Strain: Low Risk   "(8/6/2024)    Overall Financial Resource Strain (CARDIA)     Difficulty of Paying Living Expenses: Not hard at all   Food Insecurity: No Food Insecurity (8/6/2024)    Hunger Vital Sign     Worried About Running Out of Food in the Last Year: Never true     Ran Out of Food in the Last Year: Never true   Transportation Needs: No Transportation Needs (5/30/2023)    PRAPARE - Transportation     Lack of Transportation (Medical): No     Lack of Transportation (Non-Medical): No   Physical Activity: Sufficiently Active (8/6/2024)    Exercise Vital Sign     Days of Exercise per Week: 5 days     Minutes of Exercise per Session: 30 min   Stress: Stress Concern Present (8/6/2024)    [employer] - Occupational Stress Questionnaire     Feeling of Stress : To some extent   Housing Stability: Unknown (5/30/2023)    Housing Stability Vital Sign     Unable to Pay for Housing in the Last Year: No     Unstable Housing in the Last Year: No       Review of patient's allergies indicates:   Allergen Reactions    Dilaudid [hydromorphone] Itching     From IV    Nitrofurantoin Other (See Comments)     Cramping of legs       Labs:  Lab Results   Component Value Date    HGBA1C 5.3 08/06/2024       Lab Results   Component Value Date    WBC 5.30 08/06/2024    HGB 14.4 08/06/2024    HCT 45.4 08/06/2024    MCV 94 08/06/2024     08/06/2024           Review of Systems:  Low back pain.  Right thigh pain..  Balance of review of systems is negative.    Physical Exam:  Vitals:    11/22/24 0913   BP: (!) 147/105   Pulse: 88   Weight: 94.9 kg (209 lb 3.5 oz)   Height: 5' 8" (1.727 m)   PainSc: 10-Worst pain ever   PainLoc: Back     Body mass index is 31.81 kg/m².    Pain disability index:      11/22/2024     9:12 AM 11/5/2024     8:17 AM 9/24/2024     1:10 PM   Last 3 PDI Scores   Pain Disability Index (PDI) 20 39 48       Gen: NAD  Psych: mood appropriate for given condition  HEENT: eyes anicteric   CV: RRR, 2+ radial " pulse  Respiratory: non-labored, no signs of respiratory distress  Abd: non-distended  Skin: warm, dry and intact.  Gait: Able to heel walk, toe walk. No antalgic gait.     Coordination:   Tandem walking coordination: normal    Cervical spine: ROM is full in flexion, extension and lateral rotation without increased pain.  Spurling's maneuver causes no neck pain to either side.  Myofascial exam: No Tenderness to palpation across cervical paraspinous region bilaterally.    Lumbar spine:  Lumbar spine: ROM is full with flexion extension and oblique extension with no increased pain.    Riley's test causes no increased pain on either side.    Supine straight leg raise is negative bilaterally.    Internal and external rotation of the hip causes no increased pain on either side.  Myofascial exam: No tenderness to palpation across lumbar paraspinous muscles. No tenderness to palpation over the bilateral greater trochanters and bilateral SI joint    Sensory:  Intact and symmetrical to light touch in C4-T1 dermatomes bilaterally. Intact and symmetrical to light touch in L1-S1 dermatomes bilaterally.    Motor:    Right Left   C4 Shoulder Abduction  5  5   C5 Elbow Flexion    5  5   C6 Wrist Extension  5  5   C7 Elbow Extension   5  5   C8/T1 Hand Intrinsics   5  5        Right Left   L2/3 Iliacus Hip flexion  5  5   L3/4 Qudratus Femoris Knee Extension  5  5   L4/5 Tib Anterior Ankle Dorsiflexion   5  5   L5/S1 Extensor Hallicus Longus Great toe extension  5  5   S1/S2 Gastroc/Soleus Plantar Flexion  5  5      Right Left   Triceps DTR 2+ 2+   Biceps DTR 2+ 2+   Brachioradialis DTR 2+ 2+   Patellar DTR 2+ 2+   Achilles DTR 2+ 2+   Marroquin Absent  Absent   Clonus Absent Absent   Babinski Absent Absent       Imaging:    Xray lumbar spine 9-24-24:  Vertebral bodies are normal in height without evidence of fracture.  Normal sagittal alignment is preserved.  No spondylolisthesis.  No abnormal translation with flexion and  extension.  Multilevel disc degeneration with mild intervertebral disc space narrowing degenerative marginal osteophyte formation, most pronounced at T11-12, T12-L1 and L3-4 through L5-S1.Multilevel facet arthropathy.  Impression:  Multilevel mild degenerative changes of the thoracolumbar spine without evidence of an acute abnormality.    MRI lumbar spine 11-19-24:  COMPARISON:  Lumbar spine radiographs 09/24/2024, CT abdomen and pelvis 09/24/2013     FINDINGS:  Alignment: Normal.  Vertebral column: Vertebral body heights are maintained. No acute fracture is identified; however, if trauma is suspected, a CT scan would be a more sensitive examination for fractures. Diffusely heterogeneous bone marrow signal, nonspecific but may be seen with red marrow reconversion associated with chronic anemic states, osteoporosis, hypoxia, or smoking.  Multilevel mild-to-moderate intervertebral disc space narrowing, disc desiccation, mixed degenerative endplate change and anterior marginal osteophyte formation at every thoracolumbar level.  Moderate disc space narrowing most pronounced anteriorly with Modic type 2 endplate change and anterior marginal osteophyte formation at T11-12 and T12-L1.  Mild Modic type 1 endplate changes at L4-5.     Cord: Normal.  Conus terminates at L2.  Small incidental sacral Tarlov cysts at the S2-3 level  Degenerative findings:  T12-L1: Mild right asymmetric circumferential disc bulge.  Mild bilateral facet arthropathy and ligamentum flavum thickening.  No significant neural foraminal or spinal canal stenosis.  L1-L2: Right asymmetric mild circumferential disc bulge with superimposed right foraminal/extraforaminal disc protrusion.  Mild bilateral facet arthropathy and ligamentum flavum thickening.  Mild right neural foraminal stenosis.  No spinal canal stenosis.  L2-L3: Circumferential disc bulge.  Mild bilateral facet arthropathy and ligamentum flavum thickening.  Mild bilateral neural foraminal  stenosis.  Mild spinal canal stenosis.  L3-L4: Circumferential disc bulge.  Mild bilateral facet arthropathy and ligamentum flavum thickening.  Mild right neural foraminal stenosis.  Mild spinal canal stenosis.  L4-L5: Circumferential disc bulge.  Moderate bilateral facet arthropathy.  Ligamentum flavum thickening.  Mild bilateral neural foraminal stenosis.  Mild spinal canal stenosis.  L5-S1: Mild circumferential disc bulge with central annular fissure.  Severe right and moderate left facet arthropathy.  Mild left neural foraminal stenosis.  No spinal canal stenosis.     Paraspinal muscles & soft tissues: Multilocular cystic mass at the head of the pancreas measuring on the order of 4.4 cm, incompletely characterized.     Impression:  1. Multilevel degenerative changes of the lumbar spine, as detailed above, resulting in mild neural foraminal and spinal canal stenosis at L2-3 through L4-5.  2. Multilocular cystic mass at the head of the pancreas, measuring on the order of 4.1 cm, incompletely characterized.  Recommend further evaluation with MRI/MRCP with and without contrast.  This report was flagged in Epic as abnormal.    Assessment:   Chel Thompson is a 64 y.o. year old female patient who has a past medical history of Hypertension, Hypertensive heart disease without heart failure, Pancreatitis, and UTI (urinary tract infection). She presents for lower back and right thigh pain.    Right lower back resolved with PT.  Right posterior lateral thigh pain- differential includes myofascial/mechanical pain, versus early lumbar radiculopathy due to degenerative changes and/or disc hernia.  MRI shows lower lumbar degenerative chagne at L3/4, L4/5, L5/S1 with foraminal narrowing.  The greatest chagne was L5/S1 with disk bulge and facet arthropathy causing bilateral foraminlal narrowing.  Possible right L4, L5 atypical radiculopathy from degenerative chagnes.  No improvement in leg pain with medications or  PT.  There was a finding of a pancreatic cyst needing further evaluation per radiology as well.      Plan:  - recommend follow with pcp regarding pancreas cyst and further testing as that is not my area of expertise.  I have sent a message to PCP as well with imaging results and recommendation by radiologist to have MRI and MRCP with and without contrast to further evaluate.  After imaging is complete, she can be referred to appropriate specialist if needed.  - for right leg pain - with medications and PT not helping, we discussed trial of L5/S1 IL ELIANA to help with thigh pain.  She will consider and call back if she wants to proceed.  For now, she wants further testing for the pancreas. She can call me if she is having any difficulty reaching out to PCP.    Problem List Items Addressed This Visit    None  Visit Diagnoses       Lumbar radiculopathy, chronic    -  Primary    Lumbar spondylosis                  Follow Up: RTC as needed.     : Reviewed and consistent with medication use as prescribed.          Shannan Tellez PA-C  [facility]

## 2024-11-22 NOTE — TELEPHONE ENCOUNTER
----- Message from Carmelo sent at 11/22/2024  9:41 AM CST -----  Regarding: Patient Callback - Referral Request for CTScan  Contact: Pt [phone]  Patient would like to get a referral.  Referral to what specialty:  CT Scan  Does the patient want the referral with a specific physician: Lab   [facility] the [facility]  Reason (be specific):  CT Scan on pancreas   Does the patient already have the specialty clinic appointment scheduled:  No  If yes, what date is the appointment scheduled:   ASAP  Is the insurance listed in Epic correct? (this is important for a referral):  Yes  Advised patient that once provider approves this either a nurse or  will return their call?: Yes  Would the patient like a call back, or a response through their [facility] portal?:   Call  Comments:   Patient was told by her doctor to see if order for CT scan could be placed soon. They found a cyst on her pancreas. Please call back to discuss with patient ASAP.

## 2024-11-24 NOTE — TELEPHONE ENCOUNTER
An order for MRI MRCP has been placed for evaluation of the pancreatic mass, this needs to be scheduled ASAP.  Please make sure the patient is notified of the findings and the need for follow-up.

## 2024-11-25 NOTE — TELEPHONE ENCOUNTER
Called pt and scheduled MRI MRCP. Advised follow up is recommended after procedure. Verbalized understanding.

## 2024-11-26 ENCOUNTER — HOSPITAL ENCOUNTER (OUTPATIENT)
Dept: RADIOLOGY | Facility: HOSPITAL | Age: 64
Discharge: HOME | End: 2024-11-26
Attending: FAMILY MEDICINE
Payer: COMMERCIAL

## 2024-11-26 ENCOUNTER — TELEPHONE (OUTPATIENT)
Dept: FAMILY MEDICINE | Facility: CLINIC | Age: 64
End: 2024-11-26
Payer: COMMERCIAL

## 2024-11-26 ENCOUNTER — PATIENT MESSAGE (OUTPATIENT)
Dept: FAMILY MEDICINE | Facility: CLINIC | Age: 64
End: 2024-11-26
Payer: COMMERCIAL

## 2024-11-26 DIAGNOSIS — K83.8 COMMON BILE DUCT DILATATION: Primary | ICD-10-CM

## 2024-11-26 DIAGNOSIS — K86.89 PANCREATIC MASS: ICD-10-CM

## 2024-11-26 PROCEDURE — 25500020 PHARM REV CODE 255: Performed by: FAMILY MEDICINE

## 2024-11-26 PROCEDURE — 76376 3D RENDER W/INTRP POSTPROCES: CPT | Mod: TC

## 2024-11-26 PROCEDURE — A9585 GADOBUTROL INJECTION: HCPCS | Performed by: FAMILY MEDICINE

## 2024-11-26 RX ORDER — GADOBUTROL 604.72 MG/ML
10 INJECTION INTRAVENOUS
Status: COMPLETED | OUTPATIENT
Start: 2024-11-26 | End: 2024-11-26

## 2024-11-26 RX ADMIN — GADOBUTROL 10 ML: 604.72 INJECTION INTRAVENOUS at 10:11

## 2024-11-26 NOTE — TELEPHONE ENCOUNTER
There is dilatation of the main pancreatic duct which appears to have worsened.  It is recommended that patient see Gastroenterology and a possible consideration for ERCP.    There is also enlargement of the liver and fat buildup in the liver.  We can discuss that in the clinic.    Please refer to GI

## 2024-11-26 NOTE — TELEPHONE ENCOUNTER
----- Message from Breanna sent at 11/26/2024  1:19 PM CST -----  Type:  Patient Return Call    Who Called:Chel  Who Left Message for Patient:  Does the patient know what this is regarding?:MRI results  Would the patient rather a call back or a response via [facility]? Callback  Best Call Back Number:[phone]  Additional Information: Dr Burk patient wanting results of MRI

## 2024-11-27 ENCOUNTER — PATIENT MESSAGE (OUTPATIENT)
Dept: FAMILY MEDICINE | Facility: CLINIC | Age: 64
End: 2024-11-27
Payer: COMMERCIAL

## 2025-02-11 ENCOUNTER — OFFICE VISIT (OUTPATIENT)
Dept: GASTROENTEROLOGY | Facility: CLINIC | Age: 65
End: 2025-02-11
Payer: COMMERCIAL

## 2025-02-11 ENCOUNTER — OFFICE VISIT (OUTPATIENT)
Dept: FAMILY MEDICINE | Facility: CLINIC | Age: 65
End: 2025-02-11
Payer: COMMERCIAL

## 2025-02-11 ENCOUNTER — TELEPHONE (OUTPATIENT)
Dept: GASTROENTEROLOGY | Facility: CLINIC | Age: 65
End: 2025-02-11
Payer: COMMERCIAL

## 2025-02-11 ENCOUNTER — PATIENT MESSAGE (OUTPATIENT)
Dept: GASTROENTEROLOGY | Facility: CLINIC | Age: 65
End: 2025-02-11
Payer: COMMERCIAL

## 2025-02-11 ENCOUNTER — LAB VISIT (OUTPATIENT)
Dept: LAB | Facility: HOSPITAL | Age: 65
End: 2025-02-11
Attending: STUDENT IN AN ORGANIZED HEALTH CARE EDUCATION/TRAINING PROGRAM
Payer: COMMERCIAL

## 2025-02-11 VITALS
TEMPERATURE: 97 F | HEIGHT: 68 IN | SYSTOLIC BLOOD PRESSURE: 114 MMHG | BODY MASS INDEX: 31.94 KG/M2 | DIASTOLIC BLOOD PRESSURE: 70 MMHG | HEART RATE: 78 BPM | OXYGEN SATURATION: 98 % | WEIGHT: 210.75 LBS

## 2025-02-11 DIAGNOSIS — K83.8 COMMON BILE DUCT DILATATION: ICD-10-CM

## 2025-02-11 DIAGNOSIS — E66.09 CLASS 1 OBESITY DUE TO EXCESS CALORIES WITH SERIOUS COMORBIDITY AND BODY MASS INDEX (BMI) OF 31.0 TO 31.9 IN ADULT: ICD-10-CM

## 2025-02-11 DIAGNOSIS — K86.89 PANCREATIC MASS: ICD-10-CM

## 2025-02-11 DIAGNOSIS — I10 PRIMARY HYPERTENSION: ICD-10-CM

## 2025-02-11 DIAGNOSIS — E66.811 CLASS 1 OBESITY DUE TO EXCESS CALORIES WITH SERIOUS COMORBIDITY AND BODY MASS INDEX (BMI) OF 31.0 TO 31.9 IN ADULT: ICD-10-CM

## 2025-02-11 DIAGNOSIS — K86.89 DILATED PANCREATIC DUCT: Primary | ICD-10-CM

## 2025-02-11 DIAGNOSIS — G57.11 MERALGIA PARAESTHETICA, RIGHT: ICD-10-CM

## 2025-02-11 DIAGNOSIS — Z00.00 WELLNESS EXAMINATION: ICD-10-CM

## 2025-02-11 DIAGNOSIS — I10 PRIMARY HYPERTENSION: Primary | ICD-10-CM

## 2025-02-11 LAB
ALBUMIN SERPL BCP-MCNC: 4.1 G/DL (ref 3.5–5.2)
ALP SERPL-CCNC: 60 U/L (ref 40–150)
ALT SERPL W/O P-5'-P-CCNC: 26 U/L (ref 10–44)
ANION GAP SERPL CALC-SCNC: 8 MMOL/L (ref 8–16)
AST SERPL-CCNC: 15 U/L (ref 10–40)
BILIRUB SERPL-MCNC: 0.5 MG/DL (ref 0.1–1)
BUN SERPL-MCNC: 33 MG/DL (ref 8–23)
CALCIUM SERPL-MCNC: 9.9 MG/DL (ref 8.7–10.5)
CHLORIDE SERPL-SCNC: 104 MMOL/L (ref 95–110)
CO2 SERPL-SCNC: 26 MMOL/L (ref 23–29)
CREAT SERPL-MCNC: 0.8 MG/DL (ref 0.5–1.4)
EST. GFR  (NO RACE VARIABLE): >60 ML/MIN/1.73 M^2
GLUCOSE SERPL-MCNC: 90 MG/DL (ref 70–110)
POTASSIUM SERPL-SCNC: 4.5 MMOL/L (ref 3.5–5.1)
PROT SERPL-MCNC: 7.4 G/DL (ref 6–8.4)
SODIUM SERPL-SCNC: 138 MMOL/L (ref 136–145)

## 2025-02-11 PROCEDURE — 80053 COMPREHEN METABOLIC PANEL: CPT | Performed by: STUDENT IN AN ORGANIZED HEALTH CARE EDUCATION/TRAINING PROGRAM

## 2025-02-11 PROCEDURE — 99214 OFFICE O/P EST MOD 30 MIN: CPT | Mod: S$GLB,,, | Performed by: STUDENT IN AN ORGANIZED HEALTH CARE EDUCATION/TRAINING PROGRAM

## 2025-02-11 PROCEDURE — 3008F BODY MASS INDEX DOCD: CPT | Mod: CPTII,S$GLB,, | Performed by: STUDENT IN AN ORGANIZED HEALTH CARE EDUCATION/TRAINING PROGRAM

## 2025-02-11 PROCEDURE — 99999 PR PBB SHADOW E&M-EST. PATIENT-LVL IV: CPT | Mod: PBBFAC,,, | Performed by: STUDENT IN AN ORGANIZED HEALTH CARE EDUCATION/TRAINING PROGRAM

## 2025-02-11 PROCEDURE — 3078F DIAST BP <80 MM HG: CPT | Mod: CPTII,S$GLB,, | Performed by: STUDENT IN AN ORGANIZED HEALTH CARE EDUCATION/TRAINING PROGRAM

## 2025-02-11 PROCEDURE — 1159F MED LIST DOCD IN RCRD: CPT | Mod: CPTII,S$GLB,, | Performed by: STUDENT IN AN ORGANIZED HEALTH CARE EDUCATION/TRAINING PROGRAM

## 2025-02-11 PROCEDURE — 3074F SYST BP LT 130 MM HG: CPT | Mod: CPTII,S$GLB,, | Performed by: STUDENT IN AN ORGANIZED HEALTH CARE EDUCATION/TRAINING PROGRAM

## 2025-02-11 PROCEDURE — G2211 COMPLEX E/M VISIT ADD ON: HCPCS | Mod: S$GLB,,, | Performed by: STUDENT IN AN ORGANIZED HEALTH CARE EDUCATION/TRAINING PROGRAM

## 2025-02-11 PROCEDURE — 36415 COLL VENOUS BLD VENIPUNCTURE: CPT | Mod: PN | Performed by: STUDENT IN AN ORGANIZED HEALTH CARE EDUCATION/TRAINING PROGRAM

## 2025-02-11 RX ORDER — GABAPENTIN 100 MG/1
100 CAPSULE ORAL NIGHTLY
Qty: 30 CAPSULE | Refills: 2 | Status: SHIPPED | OUTPATIENT
Start: 2025-02-11 | End: 2025-05-12

## 2025-02-11 NOTE — TELEPHONE ENCOUNTER
----- Message from Satish Bai MD sent at 2/11/2025  1:52 PM CST -----  Good afternoon:     This patient needs an EUS/ERCP for dilated pancreatic duct.       Thanks in advance     Dr Bai

## 2025-02-11 NOTE — PROGRESS NOTES
"Patient ID: Chel Thompson is a 64 y.o. female.    Chief Complaint: 6mth follow up    History of Present Illness    CHIEF COMPLAINT:  Ms. Thompson presents today for follow up.    PAIN MANAGEMENT:  She experiences burning sensation in right thigh described as feeling "on fire." Pain occurs approximately once weekly, wakes her from sleep, and is exacerbated by prolonged standing. She completed 6 weeks of physical therapy without improvement in symptoms.    MEDICAL HISTORY:  She has a history of degenerative disc disease. She experienced pancreatitis 3 times in 2010 and once in 2013. A recent MRI incidentally revealed a pancreatic abnormality, which is currently being evaluated by specialists. Colonoscopy in August showed hemorrhoids.    MEDICATIONS:  She continues Valsartan/hydrochlorothiazide combination pill for blood pressure management.    FAMILY HISTORY:  Her father recently passed away. Her brother requires open heart surgery for an aortic valve aneurysm.    SOCIAL HISTORY:  She is employed at [employer].      ROS:  General: -fever, -chills, -fatigue, -weight gain, -weight loss  Eyes: -vision changes, -redness, -discharge  ENT: -ear pain, -nasal congestion, -sore throat  Cardiovascular: -chest pain, -palpitations, -lower extremity edema  Respiratory: -cough, -shortness of breath  Gastrointestinal: -abdominal pain, -nausea, -vomiting, -diarrhea, -constipation, -blood in stool  Genitourinary: -dysuria, -hematuria, -frequency  Musculoskeletal: -joint pain, +muscle pain  Skin: -rash, -lesion  Neurological: -headache, -dizziness, -numbness, -tingling  Psychiatric: -anxiety, -depression, +sleep difficulty         Pmh, Psh, Family Hx, Social Hx updated in Epic Tabs today.       2/11/2025     8:13 AM 8/6/2024     7:28 AM 5/30/2023    10:27 AM 2/8/2022     4:55 PM   Depression Patient Health Questionnaire   Over the last two weeks how often have you been bothered by little interest or pleasure in doing things Not " at all Not at all Not at all Not at all   Over the last two weeks how often have you been bothered by feeling down, depressed or hopeless Not at all Not at all Not at all Not at all   PHQ-2 Total Score 0 0 0 0       Active Problem List with Overview Notes    Diagnosis Date Noted    Weakness 09/30/2024    Decreased range of motion of trunk and back 09/30/2024    Positive FIT (fecal immunochemical test) 08/22/2024    Internal hemorrhoids 08/22/2024    Hypertension 05/30/2023     Controlled on Valsartan /12.5         Class 1 obesity with serious comorbidity and body mass index (BMI) of 31.0 to 31.9 in adult 05/30/2023     The patient is asked to make an attempt to improve diet and exercise patterns to aid in medical management of this problem.  Cut out white carbs: bread, rice, pasta, potatoes. Exercise/walk 5x/week for at least 30 minutes  .        Atypical chest pain 02/08/2021     Stress test with DR. Crockett was positive and repeat stress test here with Dr Read was negative  Following cardiology  Now taking prilosec       Postmenopausal bleeding 05/16/2018    Pneumonitis 05/04/2017    Neuropathy involving both lower extremities 05/04/2017    Viral warts 05/04/2017       Past Medical History:   Diagnosis Date    Hypertension     Hypertensive heart disease without heart failure 02/04/2021    Pancreatitis     4 times (2009,2013)    UTI (urinary tract infection)        Past Surgical History:   Procedure Laterality Date    COLONOSCOPY N/A 8/22/2024    Procedure: COLONOSCOPY;  Surgeon: Kya Garcia MD;  Location: [facility];  Service: Endoscopy;  Laterality: N/A;    DILATION AND CURETTAGE OF UTERUS      MULTIPLE TOOTH EXTRACTIONS      vaginal delivery      varicose veins         Family History   Problem Relation Name Age of Onset    Asthma Mother      Hypertension Mother      COPD Brother 3     Cancer Maternal Grandmother      Cancer Maternal Grandfather         Social History     Socioeconomic History     Marital status:      Spouse name: Shen Thompson    Number of children: 1   Tobacco Use    Smoking status: Never    Smokeless tobacco: Never   Substance and Sexual Activity    Alcohol use: Yes     Comment: occasionally    Drug use: No    Sexual activity: Yes     Partners: Male     Social Drivers of Health     Financial Resource Strain: Low Risk  (8/6/2024)    Overall Financial Resource Strain (CARDIA)     Difficulty of Paying Living Expenses: Not hard at all   Food Insecurity: No Food Insecurity (8/6/2024)    Hunger Vital Sign     Worried About Running Out of Food in the Last Year: Never true     Ran Out of Food in the Last Year: Never true   Transportation Needs: No Transportation Needs (5/30/2023)    PRAPARE - Transportation     Lack of Transportation (Medical): No     Lack of Transportation (Non-Medical): No   Physical Activity: Sufficiently Active (8/6/2024)    Exercise Vital Sign     Days of Exercise per Week: 5 days     Minutes of Exercise per Session: 30 min   Stress: Stress Concern Present (8/6/2024)    [employer] [place] [employer] - Occupational Stress Questionnaire     Feeling of Stress : To some extent   Housing Stability: Unknown (5/30/2023)    Housing Stability Vital Sign     Unable to Pay for Housing in the Last Year: No     Unstable Housing in the Last Year: No       Current Outpatient Medications on File Prior to Visit   Medication Sig Dispense Refill    cyclobenzaprine (FLEXERIL) 5 MG tablet Take 1 tablet (5 mg total) by mouth 3 (three) times daily as needed for Muscle spasms. 60 tablet 2    traZODone (DESYREL) 50 MG tablet Take 1 tablet (50 mg total) by mouth every evening. 30 tablet 5    valsartan-hydrochlorothiazide (DIOVAN-HCT) 160-12.5 mg per tablet Take 1 tablet by mouth every morning.       No current facility-administered medications on file prior to visit.       Review of patient's allergies indicates:   Allergen Reactions    Dilaudid [hydromorphone] Itching     From IV     Nitrofurantoin Other (See Comments)     Cramping of legs         Review of Systems    General - Well developed, alert and oriented in NAD  HEENT - normocephalic, no evidence of trauma, sclera white, EOMI  Neck - full range of motion  COR - regular rate and rhythm without murmurs or gallops  Lungs - Clear  Abdomen - soft, non-tender  Ext - no cyanosis or edema    Physical Exam     Assessment:     1. Pancreatic mass    2. Primary hypertension    3. Class 1 obesity due to excess calories with serious comorbidity and body mass index (BMI) of 31.0 to 31.9 in adult    4. Wellness examination        LABS:   Lab Results   Component Value Date    HGBA1C 5.3 08/06/2024    HGBA1C 5.3 02/07/2022    HGBA1C 5.2 01/19/2021      Lab Results   Component Value Date    CHOL 188 08/06/2024    CHOL 183 05/30/2023    CHOL 174 02/07/2022     Lab Results   Component Value Date    LDLCALC 112.2 08/06/2024    LDLCALC 100.8 02/07/2022    LDLCALC 98.6 01/19/2021     Lab Results   Component Value Date    WBC 5.30 08/06/2024    HGB 14.4 08/06/2024    HCT 45.4 08/06/2024     08/06/2024    CHOL 188 08/06/2024    TRIG 99 08/06/2024    HDL 56 08/06/2024    LDLDIRECT 110 05/30/2023    ALT 43 08/06/2024    AST 26 08/06/2024     08/06/2024    K 3.9 08/06/2024     08/06/2024    CREATININE 0.8 08/06/2024    BUN 22 08/06/2024    CO2 27 08/06/2024    TSH 2.847 08/06/2024    HGBA1C 5.3 08/06/2024       Plan:   Chel was seen today for 6mth follow up.    Diagnoses and all orders for this visit:    Pancreatic mass    Primary hypertension  -     Comprehensive Metabolic Panel; Future    Class 1 obesity due to excess calories with serious comorbidity and body mass index (BMI) of 31.0 to 31.9 in adult    Wellness examination  -     Comprehensive Metabolic Panel; Future  -     Hemoglobin A1C; Future  -     CBC Auto Differential; Future  -     Lipid Panel; Future  -     TSH; Future    Other orders  -     gabapentin (NEURONTIN) 100 MG capsule;  Take 1 capsule (100 mg total) by mouth every evening.        Assessment & Plan    IMPRESSION:  - Assessed blood pressure, which appears well-controlled on current medication regimen  - Reviewed recent colonoscopy results, which were largely normal with minor hemorrhoids noted  - Evaluated ongoing pancreatic concerns, noting recent specialist assessment suggesting scar tissue from previous pancreatitis episodes rather than new pathology  - Considered reported burning sensation in thigh, clinically consistent with meralgia paresthetica  - Performed physical exam to rule out other potential causes of thigh pain  - Will initiate low-dose Gabapentin trial for management of neuropathic pain symptoms    HYPERTENSION:  - Continued Valsartan/hydrochlorothiazide combination pill at current dose.  - Blood pressure is currently well-controlled.    PANCREATITIS:  - Noted patient's history of pancreatitis, with 3 episodes in 2010 and another in 2013.  - Reviewed MRI results, which showed an abnormality on the pancreas, likely scar tissue from previous pancreatitis episodes.  - Advised the patient to keep scheduled appointments for pancreas follow-up.  - Confirmed follow-up visit scheduled in June in [place].    HEMORRHOIDS:  - Noted patient underwent colonoscopy approximately 6-7 months ago in August, which revealed hemorrhoids.  - Recommend repeat colonoscopy in 10 years.    DEGENERATIVE DISC DISEASE:  - Ms. Thompson reports burning sensation in lower back, especially at night and after prolonged standing.  - Diagnosed with degenerative disc in the back.  - Noted patient completed 6 weeks of physical therapy, which improved flexibility.  - Advised the patient to continue physical therapy exercises.    MERALGIA PARESTHETICA:  - Explained meralgia paresthetica, including common causes and conservative management strategies.  - Discussed potential benefits of Gabapentin for neuropathic pain.  - Prescribed Gabapentin 100 mg  orally at bedtime daily.  - Instructed the patient to contact the office in 2-3 weeks via text message to report on effectiveness of Gabapentin.  - Recommend avoiding tight clothing and suggested ibuprofen or acetaminophen for mild symptoms.  - Provided the patient with information about meralgia paresthetica.  - Ms. Thompson reports burning sensation and numbness in the thigh, occurring about once a week.  - Ms. Thompson to avoid wearing tight clothing, particularly around the waist and hips.  - Ms. Thompson to continue previously prescribed physical therapy exercises for flexibility and pain management.    LABS:  - Ordered labs today and scheduled labs for 6 months.  - CBC, CMP ordered to be completed today.  - Lab tests scheduled for 6 months from now, to be completed 1 week prior to next appointment.    FOLLOW UP:  - Scheduled follow-up visit in 6 months.  - Performed physical exam to assess the patient's condition.  - Follow up in 6 months.           Face to Face time with patient:  8:00 AM CST -      Each patient to whom he or she provides medical services by telemedicine is:  (1) informed of the relationship between the physician and patient and the respective role of any other health care provider with respect to management of the patient; and (2) notified that he or she may decline to receive medical services by telemedicine and may withdraw from such care at any time.    I spent a total of   32    minutes face to face and non-face to face on the date of this visit.This includes time preparing to see the patient (eg, review of tests, notes), obtaining and/or reviewing additional history from an independent historian and/or outside medical records, documenting clinical information in the electronic health record, independently interpreting results and/or communicating results to the patient/family/caregiver, or care coordinator.  Visit today included increased complexity associated with the care of the episodic  problem addressed and managing the longitudinal care of the patient due to the serious and/or complex managed problem(s).    There are no Patient Instructions on file for this visit.    No follow-ups on file.  The 10-year ASCVD risk score (Jessica CANALES, et al., 2019) is: 4.6%    Values used to calculate the score:      Age: 64 years      Sex: Female      Is Non- : No      Diabetic: No      Tobacco smoker: No      Systolic Blood Pressure: 114 mmHg      Is BP treated: Yes      HDL Cholesterol: 56 mg/dL      Total Cholesterol: 188 mg/dL    This note was generated with the assistance of ambient listening technology. Verbal consent was obtained by the patient and accompanying visitor(s) for the recording of patient appointment to facilitate this note. I attest to having reviewed and edited the generated note for accuracy, though some syntax or spelling errors may persist. Please contact the author of this note for any clarification.

## 2025-02-11 NOTE — PROGRESS NOTES
Clinic Consult:  [facility] Consultation Note    Reason for Consult:  The primary encounter diagnosis was Dilated pancreatic duct. A diagnosis of Common bile duct dilatation was also pertinent to this visit.    PCP: Yesica Burk   [id number] [id number] / [facility] [address]    30 minutes of total time spent on the encounter, which includes face to face time and non-face to face time preparing to see the patient (eg, review of tests), Obtaining and/or reviewing separately obtained history, Documenting clinical information in the electronic or other health record, Independently interpreting results (not separately reported) and communicating results to the patient/family/caregiver, or Care coordination (not separately reported).   Each patient to whom he or she provides medical services by telemedicine is: (1) informed of the relationship between the physician and patient and the respective role of any other health care provider with respect to management of the patient; and (2) notified that he or she may decline to receive medical services by telemedicine and may withdraw from such care at any time.      HPI:  This is a 64 y.o. female here for evaluation of abnormal MRI/MRCP.   Had a MRCP in 11/2024 that revealed Dilatation of the main pancreatic duct in the pancreatic head and proximal body measuring up to 1.9 cm in diameter, increased compared to 2013. There is smooth tapered narrowing to the level of the major papilla. Non-masslike enhancement in the pancreatic head.   Patient denies nausea, vomiting, abdominal pain or unintentional weight loss.   She does have a history of acute pancreatitis. She has three episodes in 2010 and one episode in 2013.   She has not had any more episodes of pancreatitis since then.   She denies alcohol use.   She was told the episodes were idiopathic.   Most recent CMP in chart was normal.       ROS:  As per HPI      Medical History:   Past Medical History:    Diagnosis Date    Hypertension     Hypertensive heart disease without heart failure 02/04/2021    Pancreatitis     4 times (2009,2013)    UTI (urinary tract infection)        Surgical History:  Past Surgical History:   Procedure Laterality Date    COLONOSCOPY N/A 8/22/2024    Procedure: COLONOSCOPY;  Surgeon: Kya Garcia MD;  Location: [facility];  Service: Endoscopy;  Laterality: N/A;    DILATION AND CURETTAGE OF UTERUS      MULTIPLE TOOTH EXTRACTIONS      vaginal delivery      varicose veins         Family History:   Family History   Problem Relation Name Age of Onset    Asthma Mother      Hypertension Mother      COPD Brother 3     Cancer Maternal Grandmother      Cancer Maternal Grandfather         Social History:   Social History     Tobacco Use    Smoking status: Never    Smokeless tobacco: Never   Substance Use Topics    Alcohol use: Yes     Comment: occasionally    Drug use: No       Allergies: Reviewed    Home Medications:   Medication List with Changes/Refills   Current Medications    CYCLOBENZAPRINE (FLEXERIL) 5 MG TABLET    Take 1 tablet (5 mg total) by mouth 3 (three) times daily as needed for Muscle spasms.    GABAPENTIN (NEURONTIN) 100 MG CAPSULE    Take 1 capsule (100 mg total) by mouth every evening.    TRAZODONE (DESYREL) 50 MG TABLET    Take 1 tablet (50 mg total) by mouth every evening.    VALSARTAN-HYDROCHLOROTHIAZIDE (DIOVAN-HCT) 160-12.5 MG PER TABLET    Take 1 tablet by mouth every morning.         Physical Exam:  Vital Signs:  LMP 05/16/2015   There is no height or weight on file to calculate BMI.    Physical Exam  Vitals reviewed: virtual visit.          Labs: Pertinent labs reviewed.      Assessment:  Problem List Items Addressed This Visit          GI    Dilated pancreatic duct - Primary    Current Assessment & Plan     Plan:   -Will reach out to the AES service at [facility] for an EUS +/- ERCP          Other Visit Diagnoses                   Dilated pancreatic duct                     Follow up if symptoms worsen or fail to improve.    Order summary:  No orders of the defined types were placed in this encounter.      Thank you so much for allowing me to participate in the care of Chel Bai MD  Gastroenterology and Hepatology  [facility]

## 2025-02-13 ENCOUNTER — TELEPHONE (OUTPATIENT)
Dept: ENDOSCOPY | Facility: HOSPITAL | Age: 65
End: 2025-02-13
Payer: COMMERCIAL

## 2025-02-13 ENCOUNTER — TELEPHONE (OUTPATIENT)
Dept: GASTROENTEROLOGY | Facility: CLINIC | Age: 65
End: 2025-02-13
Payer: COMMERCIAL

## 2025-02-13 NOTE — TELEPHONE ENCOUNTER
Telephone patient to schedule EUS with no answer. Direct contact given to call back to schedule procedure.

## 2025-02-13 NOTE — TELEPHONE ENCOUNTER
Per Dr. Dubose review, Clinic visit can be with Philippe or any of us and virtual in next 1-2 weeks. EUS with anyone in next 2-3 weeks, either site should be ok.

## 2025-02-13 NOTE — TELEPHONE ENCOUNTER
----- Message from Lashanda sent at 2/13/2025  3:23 PM CST -----  Type:  Patient Returning Call    Who Called:PT  Who Left Message for Patient:NURSE  Does the patient know what this is regarding?:Pt returning missed call  Would the patient rather a call back or a response via [facility]? CALL  Best Call Back Number:Telephone Information:  Mobile          [phone]      Additional Information: Please advise thank you

## 2025-02-21 ENCOUNTER — TELEPHONE (OUTPATIENT)
Dept: ENDOSCOPY | Facility: HOSPITAL | Age: 65
End: 2025-02-21
Payer: COMMERCIAL

## 2025-02-21 DIAGNOSIS — K85.90 ACUTE PANCREATITIS, UNSPECIFIED COMPLICATION STATUS, UNSPECIFIED PANCREATITIS TYPE: Primary | ICD-10-CM

## 2025-02-21 NOTE — TELEPHONE ENCOUNTER
Spoke to pt. Patient stated okay with direct EUS and AES clinic appointment no longer needed. AES clinic appointment  cxl per pt request.

## 2025-02-21 NOTE — TELEPHONE ENCOUNTER
Spoke to patient to schedule procedure(s) Upper Endoscopy Ultrasound (EUS)       Physician to perform procedure(s) Dr. IVANA Mtz  Date of Procedure (s) 2/26/2025  Arrival Time 9:30 AM  Time of Procedure(s) 10:30 AM   Location of Procedure(s) [facility] [address] [facility]  Type of Rx Prep sent to patient: Other  Instructions provided to patient via [facility]    Patient was informed on the following information and verbalized understanding. Screening questionnaire reviewed with patient and complete. If procedure requires anesthesia, a responsible adult needs to be present to accompany the patient home, patient cannot drive after receiving anesthesia. Appointment details are tentative, especially check-in time. Patient will receive a prep-op call 7 days prior to confirm check-in time for procedure. If applicable the patient should contact their pharmacy to verify Rx for procedure prep is ready for pick-up. Patient was advised to call the scheduling department at [phone] if pharmacy states no Rx is available. Patient was advised to call the endoscopy scheduling department if any questions or concerns arise.      [facility] Scheduling Department

## 2025-02-26 ENCOUNTER — ANESTHESIA EVENT (OUTPATIENT)
Dept: ENDOSCOPY | Facility: HOSPITAL | Age: 65
End: 2025-02-26
Payer: COMMERCIAL

## 2025-02-26 ENCOUNTER — ANESTHESIA (OUTPATIENT)
Dept: ENDOSCOPY | Facility: HOSPITAL | Age: 65
End: 2025-02-26
Payer: COMMERCIAL

## 2025-02-26 ENCOUNTER — HOSPITAL ENCOUNTER (OUTPATIENT)
Facility: HOSPITAL | Age: 65
Discharge: HOME | End: 2025-02-26
Attending: INTERNAL MEDICINE | Admitting: INTERNAL MEDICINE
Payer: COMMERCIAL

## 2025-02-26 ENCOUNTER — TELEPHONE (OUTPATIENT)
Dept: SURGICAL ONCOLOGY | Facility: CLINIC | Age: 65
End: 2025-02-26
Payer: COMMERCIAL

## 2025-02-26 VITALS
HEART RATE: 81 BPM | HEIGHT: 68 IN | RESPIRATION RATE: 12 BRPM | TEMPERATURE: 98 F | OXYGEN SATURATION: 100 % | BODY MASS INDEX: 31.83 KG/M2 | SYSTOLIC BLOOD PRESSURE: 113 MMHG | WEIGHT: 210 LBS | DIASTOLIC BLOOD PRESSURE: 75 MMHG

## 2025-02-26 DIAGNOSIS — R93.5 ABNORMAL MRI OF ABDOMEN: ICD-10-CM

## 2025-02-26 PROCEDURE — 37000008 HC ANESTHESIA 1ST 15 MINUTES: Performed by: INTERNAL MEDICINE

## 2025-02-26 PROCEDURE — 43259 EGD US EXAM DUODENUM/JEJUNUM: CPT | Mod: ,,, | Performed by: INTERNAL MEDICINE

## 2025-02-26 PROCEDURE — 37000009 HC ANESTHESIA EA ADD 15 MINS: Performed by: INTERNAL MEDICINE

## 2025-02-26 PROCEDURE — 25000003 PHARM REV CODE 250: Performed by: NURSE ANESTHETIST, CERTIFIED REGISTERED

## 2025-02-26 PROCEDURE — 43259 EGD US EXAM DUODENUM/JEJUNUM: CPT | Performed by: INTERNAL MEDICINE

## 2025-02-26 PROCEDURE — 63600175 PHARM REV CODE 636 W HCPCS: Performed by: NURSE ANESTHETIST, CERTIFIED REGISTERED

## 2025-02-26 RX ORDER — SODIUM CHLORIDE 0.9 % (FLUSH) 0.9 %
10 SYRINGE (ML) INJECTION
Status: DISCONTINUED | OUTPATIENT
Start: 2025-02-26 | End: 2025-02-26 | Stop reason: HOSPADM

## 2025-02-26 RX ORDER — LIDOCAINE HYDROCHLORIDE 20 MG/ML
INJECTION INTRAVENOUS
Status: DISCONTINUED | OUTPATIENT
Start: 2025-02-26 | End: 2025-02-26

## 2025-02-26 RX ORDER — PROPOFOL 10 MG/ML
VIAL (ML) INTRAVENOUS CONTINUOUS PRN
Status: DISCONTINUED | OUTPATIENT
Start: 2025-02-26 | End: 2025-02-26

## 2025-02-26 RX ORDER — PROPOFOL 10 MG/ML
VIAL (ML) INTRAVENOUS
Status: DISCONTINUED | OUTPATIENT
Start: 2025-02-26 | End: 2025-02-26

## 2025-02-26 RX ADMIN — SODIUM CHLORIDE: 0.9 INJECTION, SOLUTION INTRAVENOUS at 09:02

## 2025-02-26 RX ADMIN — LIDOCAINE HYDROCHLORIDE 100 MG: 20 INJECTION, SOLUTION INTRAVENOUS at 10:02

## 2025-02-26 RX ADMIN — PROPOFOL 80 MG: 10 INJECTION, EMULSION INTRAVENOUS at 10:02

## 2025-02-26 RX ADMIN — PROPOFOL 150 MCG/KG/MIN: 10 INJECTION, EMULSION INTRAVENOUS at 10:02

## 2025-02-26 NOTE — [employer] PATIENT INSTRUCTIONS
Discharge Summary/Instructions after an Endoscopic Procedure  Patient Name: Chel Thompson  Patient MRN: [id number]  Patient YOB: 1960 Wednesday, February 26, 2025  Abdi Mtz MD  Dear patient,  As a result of recent federal legislation (The Federal Cures Act), you may   receive lab or pathology results from your procedure in your [facility]   account before your physician is able to contact you. Your physician or   their representative will relay the results to you with their   recommendations at their soonest availability.  Thank you,  Your health is very important to us during the Covid Crisis. Following your   procedure today, you will receive a daily text for 2 weeks asking about   signs or symptoms of Covid 19.  Please respond to this text when you   receive it so we can follow up and keep you as safe as possible.   RESTRICTIONS:  During your procedure today, you received medications for sedation.  These   medications may affect your judgment, balance and coordination.  Therefore,   for 24 hours, you have the following restrictions:   - DO NOT drive a car, operate machinery, make legal/financial decisions,   sign important papers or drink alcohol.    ACTIVITY:  Today: no heavy lifting, straining or running due to procedural   sedation/anesthesia.  The following day: return to full activity including work.  DIET:  Eat and drink normally unless instructed otherwise.     TREATMENT FOR COMMON SIDE EFFECTS:  - Mild abdominal pain, nausea, belching, bloating or excessive gas:  rest,   eat lightly and use a heating pad.  - Sore Throat: treat with throat lozenges and/or gargle with warm salt   water.  - Because air was used during the procedure, expelling large amounts of air   from your rectum or belching is normal.  - If a bowel prep was taken, you may not have a bowel movement for 1-3 days.    This is normal.  SYMPTOMS TO WATCH FOR AND REPORT TO YOUR PHYSICIAN:  1. Abdominal pain or bloating, other than  gas cramps.  2. Chest pain.  3. Back pain.  4. Signs of infection such as: chills or fever occurring within 24 hours   after the procedure.  5. Rectal bleeding, which would show as bright red, maroon, or black stools.   (A tablespoon of blood from the rectum is not serious, especially if   hemorrhoids are present.)  6. Vomiting.  7. Weakness or dizziness.  GO DIRECTLY TO THE NEAREST EMERGENCY ROOM IF YOU HAVE ANY OF THE FOLLOWING:      Difficulty breathing              Chills and/or fever over 101 F   Persistent vomiting and/or vomiting blood   Severe abdominal pain   Severe chest pain   Black, tarry stools   Bleeding- more than one tablespoon   Any other symptom or condition that you feel may need urgent attention  Your doctor recommends these additional instructions:  If any biopsies were taken, your doctors clinic will contact you in 1 to 2   weeks with any results.  - Discharge patient to home (ambulatory).   - Patient has a contact number available for emergencies.  The signs and   symptoms of potential delayed complications were discussed with the   patient.  Return to normal activities tomorrow.  Written discharge   instructions were provided to the patient.   - Resume previous diet.   - Continue present medications.   - Return to primary care physician as previously scheduled.   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 Will arrange for pancreatic surgery   referral.  For questions, problems or results please call your physician - Abdi Mtz MD.  EMERGENCY PHONE NUMBER: [phone],  LAB RESULTS: [phone]  IF A COMPLICATION OR EMERGENCY SITUATION ARISES AND YOU ARE UNABLE TO REACH   YOUR PHYSICIAN - GO DIRECTLY TO THE EMERGENCY ROOM.  Abdi Mtz MD  2/26/2025 10:32:04 AM  This report has been verified and signed electronically.  Dear patient,  As a result of recent federal legislation (The Federal Cures Act), you may    receive lab or pathology results from your procedure in your [facility]   account before your physician is able to contact you. Your physician or   their representative will relay the results to you with their   recommendations at their soonest availability.  Thank you,  PROVATION

## 2025-02-26 NOTE — ANESTHESIA PREPROCEDURE EVALUATION
[facility]  Anesthesia Pre-Operative Evaluation         Patient Name: Chel Thompson  YOB: 1960  MRN: [id number]    SUBJECTIVE:     02/26/2025    Procedure(s) (LRB):  ULTRASOUND, UPPER GI TRACT, ENDOSCOPIC (N/A)    Chel Thompson is a 64 y.o. female here for Procedure(s) (LRB):  ULTRASOUND, UPPER GI TRACT, ENDOSCOPIC (N/A)    Drips:     Problem List[1]    Review of patient's allergies indicates:   Allergen Reactions    Dilaudid [hydromorphone] Itching     From IV    Nitrofurantoin Other (See Comments)     Cramping of legs       Medications Ordered Prior to Encounter[2]    Past Surgical History:   Procedure Laterality Date    COLONOSCOPY N/A 8/22/2024    Procedure: COLONOSCOPY;  Surgeon: Kya Garcia MD;  Location: [facility];  Service: Endoscopy;  Laterality: N/A;    DILATION AND CURETTAGE OF UTERUS      MULTIPLE TOOTH EXTRACTIONS      vaginal delivery      varicose veins         Social History[3]      OBJECTIVE:     Vital Signs Range (Last 24H):       Significant Labs:  Lab Results   Component Value Date    WBC 5.30 08/06/2024    HGB 14.4 08/06/2024    HCT 45.4 08/06/2024     08/06/2024    CHOL 188 08/06/2024    TRIG 99 08/06/2024    HDL 56 08/06/2024    LDLDIRECT 110 05/30/2023    ALT 26 02/11/2025    AST 15 02/11/2025     02/11/2025    K 4.5 02/11/2025     02/11/2025    CREATININE 0.8 02/11/2025    BUN 33 (H) 02/11/2025    CO2 26 02/11/2025    TSH 2.847 08/06/2024    HGBA1C 5.3 08/06/2024       Diagnostic Studies:    EKG:   Results for orders placed or performed during the hospital encounter of 12/11/22   EKG 12-lead    Collection Time: 12/11/22  6:22 AM    Narrative    Test Reason : R07.9    Vent. Rate : 067 BPM     Atrial Rate : 067 BPM     P-R Int : 154 ms          QRS Dur : 090 ms      QT Int : 430 ms       P-R-T Axes : 057 019  034 degrees     QTc Int : 454 ms    Normal sinus rhythm with sinus arrhythmia  Possible Left atrial enlargement  Borderline Abnormal ECG  When compared with ECG of 19-JAN-2021 11:02,  T wave inversion now evident in Anterior leads  Confirmed by Collins RUSSELL MD ([id number]) on 12/11/2022 7:37:41 AM    Referred By: DELMY   SELF           Confirmed By:Collins RUSSELL MD             Pre-op Assessment    I have reviewed the Patient Summary Reports.     I have reviewed the Nursing Notes. I have reviewed the NPO Status.   I have reviewed the Medications.     Review of Systems  Anesthesia Hx:  No problems with previous Anesthesia   History of prior surgery of interest to airway management or planning:            Denies Personal Hx of Anesthesia complications.                    Social:  Non-Smoker, Social Alcohol Use       Cardiovascular:     Hypertension                                    Hypertension         Pulmonary:  Pulmonary Normal   Denies COPD.  Denies Asthma.     Denies Sleep Apnea.                Hepatic/GI:      Denies GERD.   Dilatation of the main pancreatic duct             Neurological:    Neuromuscular Disease,                                 Neuromuscular Disease   Endocrine:  Denies Diabetes. Denies Hypothyroidism.  Denies Hyperthyroidism.       Obesity / BMI > 30      Physical Exam  General: Well nourished, Cooperative, Oriented and Alert    Airway:  Mallampati: III   Mouth Opening: Normal  TM Distance: Normal  Tongue: Normal  Neck ROM: Normal ROM    Dental:  Intact        Anesthesia Plan  Type of Anesthesia, risks & benefits discussed:    Anesthesia Type: Gen Natural Airway  Intra-op Monitoring Plan: Standard ASA Monitors  Post Op Pain Control Plan: multimodal analgesia  Induction:  IV  Informed Consent: Informed consent signed with the Patient and all parties understand the risks and agree with anesthesia plan.  All questions answered.   ASA Score: 2  Day of Surgery Review of History & Physical: H&P Update  referred to the surgeon/provider.    Ready For Surgery From Anesthesia Perspective.     .           [1]   Patient Active Problem List  Diagnosis    Pneumonitis    Neuropathy involving both lower extremities    Viral warts    Postmenopausal bleeding    Atypical chest pain    Hypertension    Class 1 obesity with serious comorbidity and body mass index (BMI) of 31.0 to 31.9 in adult    Positive FIT (fecal immunochemical test)    Internal hemorrhoids    Weakness    Decreased range of motion of trunk and back    Dilated pancreatic duct   [2]   No current facility-administered medications on file prior to encounter.     Current Outpatient Medications on File Prior to Encounter   Medication Sig Dispense Refill    cyclobenzaprine (FLEXERIL) 5 MG tablet Take 1 tablet (5 mg total) by mouth 3 (three) times daily as needed for Muscle spasms. 60 tablet 2    gabapentin (NEURONTIN) 100 MG capsule Take 1 capsule (100 mg total) by mouth every evening. 30 capsule 2    traZODone (DESYREL) 50 MG tablet Take 1 tablet (50 mg total) by mouth every evening. 30 tablet 5    valsartan-hydrochlorothiazide (DIOVAN-HCT) 160-12.5 mg per tablet Take 1 tablet by mouth every morning.     [3]   Social History  Socioeconomic History    Marital status:      Spouse name: Shen Thompson    Number of children: 1   Tobacco Use    Smoking status: Never    Smokeless tobacco: Never   Substance and Sexual Activity    Alcohol use: Yes     Comment: occasionally    Drug use: No    Sexual activity: Yes     Partners: Male     Social Drivers of Health     Financial Resource Strain: Low Risk  (8/6/2024)    Overall Financial Resource Strain (CARDIA)     Difficulty of Paying Living Expenses: Not hard at all   Food Insecurity: No Food Insecurity (8/6/2024)    Hunger Vital Sign     Worried About Running Out of Food in the Last Year: Never true     Ran Out of Food in the Last Year: Never true   Transportation Needs: No Transportation Needs (5/30/2023)    PRAPARE -  Transportation     Lack of Transportation (Medical): No     Lack of Transportation (Non-Medical): No   Physical Activity: Sufficiently Active (8/6/2024)    Exercise Vital Sign     Days of Exercise per Week: 5 days     Minutes of Exercise per Session: 30 min   Stress: Stress Concern Present (8/6/2024)    [employer] - Occupational Stress Questionnaire     Feeling of Stress : To some extent   Housing Stability: Unknown (5/30/2023)    Housing Stability Vital Sign     Unable to Pay for Housing in the Last Year: No     Unstable Housing in the Last Year: No

## 2025-02-26 NOTE — H&P
Short Stay Endoscopy History and Physical    PCP - Yesica Burk MD  Referring Physician - Abdi Mtz MD  [address] [place] [address]    Procedure - EUS  ASA - per anesthesia  Mallampati - per anesthesia  History of Anesthesia problems - no  Family history Anesthesia problems -  no   Plan of anesthesia - General    HPI:  This is a 64 y.o. female here for evaluation of: dilated pancreatic duct    Reflux - no  Dysphagia - no  Abdominal pain - no  Diarrhea - no    ROS:  Constitutional: No fevers, chills, No weight loss  CV: No chest pain  Pulm: No cough, No shortness of breath  GI: see HPI    Medical History:  has a past medical history of Hypertension, Hypertensive heart disease without heart failure (02/04/2021), Pancreatitis, and UTI (urinary tract infection).    Surgical History:  has a past surgical history that includes varicose veins; Multiple tooth extractions; vaginal delivery; Dilation and curettage of uterus; and Colonoscopy (N/A, 8/22/2024).    Family History: family history includes Asthma in her mother; COPD in her brother; Cancer in her maternal grandfather and maternal grandmother; Hypertension in her mother..    Social History:  reports that she has never smoked. She has never used smokeless tobacco. She reports current alcohol use. She reports that she does not use drugs.    Review of patient's allergies indicates:   Allergen Reactions    Dilaudid [hydromorphone] Itching     From IV    Nitrofurantoin Other (See Comments)     Cramping of legs       Medications:   Prescriptions Prior to Admission[1]    Physical Exam:    Vital Signs: There were no vitals filed for this visit.    General Appearance: Well appearing in no acute distress  Lungs: no labored breathing  CVS:  regular rate  Abdomen: non tender    Labs:  Lab Results   Component Value Date    WBC 5.30 08/06/2024    HGB 14.4 08/06/2024    HCT 45.4 08/06/2024     08/06/2024    CHOL 188 08/06/2024    TRIG 99 08/06/2024     HDL 56 08/06/2024    LDLDIRECT 110 05/30/2023    ALT 26 02/11/2025    AST 15 02/11/2025     02/11/2025    K 4.5 02/11/2025     02/11/2025    CREATININE 0.8 02/11/2025    BUN 33 (H) 02/11/2025    CO2 26 02/11/2025    TSH 2.847 08/06/2024    HGBA1C 5.3 08/06/2024       I have explained the risks and benefits of this endoscopic procedure to the patient including but not limited to bleeding, inflammation, infection, perforation, and death.      Abdi Mtz MD        [1]   Medications Prior to Admission   Medication Sig Dispense Refill Last Dose/Taking    cyclobenzaprine (FLEXERIL) 5 MG tablet Take 1 tablet (5 mg total) by mouth 3 (three) times daily as needed for Muscle spasms. 60 tablet 2     gabapentin (NEURONTIN) 100 MG capsule Take 1 capsule (100 mg total) by mouth every evening. 30 capsule 2     traZODone (DESYREL) 50 MG tablet Take 1 tablet (50 mg total) by mouth every evening. 30 tablet 5     valsartan-hydrochlorothiazide (DIOVAN-HCT) 160-12.5 mg per tablet Take 1 tablet by mouth every morning.

## 2025-02-26 NOTE — TRANSFER OF CARE
"Anesthesia Transfer of Care Note    Patient: Chel Thompson    Procedure(s) Performed: Procedure(s) (LRB):  ULTRASOUND, UPPER GI TRACT, ENDOSCOPIC (N/A)    Patient location: GI    Anesthesia Type: general    Transport from OR: Transported from OR on room air with adequate spontaneous ventilation    Post pain: adequate analgesia    Post assessment: no apparent anesthetic complications    Post vital signs: stable    Level of consciousness: awake, alert and oriented    Nausea/Vomiting: no nausea/vomiting    Complications: none    Transfer of care protocol was followed      Last vitals: Visit Vitals  /75 (BP Location: Left arm, Patient Position: Lying)   Pulse 81   Temp 36.8 °C (98.2 °F) (Temporal)   Resp 18   Ht 5' 8" (1.727 m)   Wt 95.3 kg (210 lb)   LMP 05/16/2015   SpO2 99%   Breastfeeding No   BMI 31.93 kg/m²     "

## 2025-02-26 NOTE — ANESTHESIA POSTPROCEDURE EVALUATION
Anesthesia Post Evaluation    Patient: Chel Thompson    Procedure(s) Performed: Procedure(s) (LRB):  ULTRASOUND, UPPER GI TRACT, ENDOSCOPIC (N/A)    Final Anesthesia Type: general      Patient location during evaluation: PACU  Patient participation: Yes- Able to Participate  Level of consciousness: awake  Post-procedure vital signs: reviewed and stable  Pain management: adequate  Airway patency: patent    PONV status at discharge: No PONV  Anesthetic complications: no      Cardiovascular status: blood pressure returned to baseline  Respiratory status: unassisted  Hydration status: euvolemic  Follow-up not needed.              Vitals Value Taken Time   /75 02/26/25 10:50   Temp 36.4 °C (97.5 °F) 02/26/25 10:20   Pulse 81 02/26/25 10:50   Resp 12 02/26/25 10:50   SpO2 100 % 02/26/25 10:50         Event Time   Out of Recovery 10:50:00         Pain/Meghan Score: Meghan Score: 10 (2/26/2025 10:50 AM)

## 2025-03-04 ENCOUNTER — TELEPHONE (OUTPATIENT)
Dept: SURGICAL ONCOLOGY | Facility: CLINIC | Age: 65
End: 2025-03-04
Payer: COMMERCIAL

## 2025-03-04 ENCOUNTER — OFFICE VISIT (OUTPATIENT)
Dept: SURGICAL ONCOLOGY | Facility: CLINIC | Age: 65
End: 2025-03-04
Payer: COMMERCIAL

## 2025-03-04 VITALS
SYSTOLIC BLOOD PRESSURE: 108 MMHG | TEMPERATURE: 98 F | HEIGHT: 68 IN | DIASTOLIC BLOOD PRESSURE: 72 MMHG | BODY MASS INDEX: 32.38 KG/M2 | WEIGHT: 213.63 LBS | HEART RATE: 52 BPM

## 2025-03-04 DIAGNOSIS — D49.0 INTRADUCTAL PAPILLARY MUCINOUS NEOPLASM OF PANCREAS: Primary | ICD-10-CM

## 2025-03-04 PROCEDURE — 99205 OFFICE O/P NEW HI 60 MIN: CPT | Mod: S$GLB,,, | Performed by: SURGERY

## 2025-03-04 PROCEDURE — 3074F SYST BP LT 130 MM HG: CPT | Mod: CPTII,S$GLB,, | Performed by: SURGERY

## 2025-03-04 PROCEDURE — 3078F DIAST BP <80 MM HG: CPT | Mod: CPTII,S$GLB,, | Performed by: SURGERY

## 2025-03-04 PROCEDURE — 99999 PR PBB SHADOW E&M-EST. PATIENT-LVL III: CPT | Mod: PBBFAC,,, | Performed by: SURGERY

## 2025-03-04 PROCEDURE — 3008F BODY MASS INDEX DOCD: CPT | Mod: CPTII,S$GLB,, | Performed by: SURGERY

## 2025-03-04 PROCEDURE — 1159F MED LIST DOCD IN RCRD: CPT | Mod: CPTII,S$GLB,, | Performed by: SURGERY

## 2025-03-04 NOTE — PROGRESS NOTES
Surgical Oncology Clinic Note      Referring Provider: Dr. Abdi Mtz   PCP: Yesica Burk MD    Reason For Visit: Pancreas: cyst (IPMN)    HISTORY OF PRESENT ILLNESS     Chel Thompson is a 64 y.o. female with history of HTN who presents today for evaluation and management of main duct- IPMN.      She has a remote history of pancreatitis with 3 bouts of pancreatitis back in 2010.  She states that she was not drinking at that time and that nobody was ever able to tell her why she was having pancreatitis.  It sounds like it was shocked up has been idiopathic.  Then about 3 years later in 2013 she states she was on the way to work and suddenly had sudden onset of abdominal/chest pain was taken to the hospital and an extensive workup was done which ultimately revealed an additional episode of pancreatitis.  Around that time she had imaging done in the hospital either a CT or an MRI however is uncertain what it showed.  That was done at [facility].  She unfortunately does not have any records of that either.  Following that bout of pancreatitis she has never had any additional episodes or issues.    She was in her usual state of good health up until a few months ago when she started having right lower extremity pain for which she went to the [facility] in [place].  MRI of the lumbar spine was ordered which showed an evidence of degenerative changes but also showed what was concerning for a multilocular cystic mass in the head of the pancreas measuring approximately 4.4 cm which was incompletely characterized.  A subsequent MRI/MRCP of the abdomen was ordered and done on 11/26/2024.  This showed dilation of the main pancreatic duct in the pancreatic head and proximal body measuring up to 1.9 cm, increased compared to 2013.  There was also non masslike enhancement of the pancreatic head.  She was then referred to GI who she saw on 02/11/2025.  They referred her to  advanced endoscopy in [place] for an EUS.  That was done on 02/26/2025 by Dr. Mtz and revealed gaping ampulla with visible extrusion of mucin as well as dilated pancreatic duct in the pancreatic head in January of the pancreas measuring up to 20 mm in diameter, with tapering of and of the ductal dilatation in the body/tail.  There were no intraductal nodule seen on EUS in no pancreatic mass seen.    She is otherwise healthy.  She is a nonsmoker.  She drinks alcohol on rare occasions.  Family history is significant for prostate cancer in her brother as well as lung cancer in her maternal grandfather.  Otherwise no significant family history aside from what is noted below    Brother- prostate cancer, CAD   Materna grandfather=- lung cancer  Maternal grandmother- tumor in the eye  Maternal family is from [place] so limited information on them       Past Medical History:   Diagnosis Date    Hypertension     Hypertensive heart disease without heart failure 02/04/2021    Pancreatitis     4 times (2009,2013)    UTI (urinary tract infection)        Past Surgical History:   Procedure Laterality Date    COLONOSCOPY N/A 8/22/2024    Procedure: COLONOSCOPY;  Surgeon: Kya Garcia MD;  [facility] ENDO;  Service: Endoscopy;  Laterality: N/A;    DILATION AND CURETTAGE OF UTERUS      ENDOSCOPIC ULTRASOUND OF UPPER GASTROINTESTINAL TRACT N/A 2/26/2025    Procedure: ULTRASOUND, UPPER GI TRACT, ENDOSCOPIC;  Surgeon: Abdi Mtz MD;  Location: [facility] ENDO;  Service: Endoscopy;  Laterality: N/A;  2/21 portal-EUS with anyone in next 2-3 weeks, either site should be ok. [email or web address]    MULTIPLE TOOTH EXTRACTIONS      vaginal delivery      varicose veins         Family History   Problem Relation Name Age of Onset    Asthma Mother      Hypertension Mother      COPD Brother 3     Cancer Maternal Grandmother      Cancer Maternal Grandfather         Social History[1]       Medication List            Accurate as of March 4, 2025   "2:10 PM. If you have any questions, ask your nurse or doctor.                CONTINUE taking these medications      cyclobenzaprine 5 MG tablet  Commonly known as: FLEXERIL  Take 1 tablet (5 mg total) by mouth 3 (three) times daily as needed for Muscle spasms.     gabapentin 100 MG capsule  Commonly known as: NEURONTIN  Take 1 capsule (100 mg total) by mouth every evening.     traZODone 50 MG tablet  Commonly known as: DESYREL  Take 1 tablet (50 mg total) by mouth every evening.     valsartan-hydrochlorothiazide 160-12.5 mg per tablet  Commonly known as: DIOVAN-HCT              Review of patient's allergies indicates:   Allergen Reactions    Dilaudid [hydromorphone] Itching     From IV    Nitrofurantoin Other (See Comments)     Cramping of legs       ROS      PHYSICAL EXAM     Vitals:    03/04/25 0748   BP: 108/72   Pulse: (!) 52   Temp: 98.2 °F (36.8 °C)     Body mass index is 32.48 kg/m².  ECOG SCORE    0 - Fully active-able to carry on all pre-disease performance without restriction         Physical Exam     DATA REVIEW:  I personally reviewed the following records for this visit: lab work from prior visit, notes from other physicians, magnetic resonance/MR imaging, surgical pathology results, endoscopy results, radiographic study evaluation, and laboratory results done by primary care physician    LABS       Lab Results   Component Value Date    WBC 5.30 08/06/2024    HGB 14.4 08/06/2024    HCT 45.4 08/06/2024     08/06/2024     Lab Results   Component Value Date    GLU 90 02/11/2025    CALCIUM 9.9 02/11/2025    ALBUMIN 4.1 02/11/2025    PROT 7.4 02/11/2025     02/11/2025    K 4.5 02/11/2025    CO2 26 02/11/2025     02/11/2025    BUN 33 (H) 02/11/2025    CREATININE 0.8 02/11/2025    ALKPHOS 60 02/11/2025    ALT 26 02/11/2025    AST 15 02/11/2025    BILITOT 0.5 02/11/2025       Nutritional:  No results found for: "PREALBUMIN"    Tumor Markers:  No results found for: "CEA", "AMYLASE", " ""ASPIRATE", "TCW252", "", "MG6105", "AFP", "AFPTM"  No results found for: ""    PATHOLOGY     None    IMAGING     11/19/2024:  MRI Lumbar Spine  Impression:  1. Multilevel degenerative changes of the lumbar spine, as detailed above, resulting in mild neural foraminal and spinal canal stenosis at L2-3 through L4-5.  2. Multilocular cystic mass at the head of the pancreas, measuring on the order of 4.1 cm, incompletely characterized.  Recommend further evaluation with MRI/MRCP with and without contrast.  This report was flagged in Epic as abnormal.    11/26/2024:  MRI/ MRCP Abdomen  Impression:  1. Dilatation main pancreatic duct which appears increased to 2013.  Differential considerations include upstream dilatation the setting of scarring prior episodes of pancreatitis and main branch IPMN.  Recommend GI consultation and consideration for ERCP.  2. Hepatomegaly and hepatic steatosis.    02/26/2025:  EUS (Dr. Mtz)  No sign of significant and a sonographic abnormality in the common bile duct.  The pancreatic duct had a dilated and a sonographic parents in the pancreatic head in January of the pancreas.  The duct measured up to 20 mm in diameter, with tapering of the ductal dilatation in the body/tail.  There were no intraductal nodule seen on EUS.  No pancreatic mass was seen.  A gaping ampulla with visible extrusion of mucin was seen intra procedurally.    ASSESSMENT & PLAN     1. Intraductal papillary mucinous neoplasm of pancreas       Chel Thompson is a 64 y.o. female with newly discovered main duct IPMN.    We reviewed the diagnosis, natural history, and treatment of main duct IPMN.  I discussed with her that there are several different types of IPMN which may involve either the main pancreatic duct, the branch ducts, or both.  We discussed that branch duct IPMNs have a lower risk for developing a cancer (approximately 20% at 10 years), where as patients with IPMN involving the main " doctor much higher risk (approximately 70%).  We reviewed the imaging from her EUS as well as her MRCP showing dilation of the main duct up to 2 cm.  I also discussed with her the findings on EUS of the gaping ampulla with mucin extruding from it which is pathognomonic for a main duct IPMN.  At this time I see no evidence of a esau malignancy however she is young and otherwise healthy and certainly is high-risk over the next 10 years to develop a pancreatic malignancy.  I discussed with her that there was no good data to suggest at which point in time main duct IPMN can transition or how long they can be safely watched when they are greater than 10 mm.  We did discuss that the standard of care for main duct IPMN just to proceed with surgical resection.    We discussed the planned surgical intervention which would be a Whipple procedure which would involve removal of the duodenum, the head of the pancreas, and part of the end of the stomach and bile ducts was reconstruction of 3 different anastomosis including a pancreaticojejunostomy, hepaticojejunostomy, and gastrojejunostomy.  Do this anatomy out for her as well.  I did discuss with her that in general I expect that it will take her about 6 months to fully recover from this operation although by 6-8 weeks she should be feeling much more like herself again.  We discussed that this is a an open procedure in my hands she would expect anywhere from 7-14 days in the hospital depending on how she does.  She understands that approximately a 3rd of patients who did not have diabetes previously we will develop diabetes following the surgery, and about a 3rd who were on oral medications for diabetes would require insulin.  At this time she has no evidence of diabetes but understands that she may in the future require some medications for either into insulin management and or pancreatic enzyme replacement.      I also reviewed with her that the belief is that these IPMN to  represent a field defect within the pancreas and that even if she undergoes a Whipple she will still require continued surveillance after this.  In addition there is always a chance that after surgery they would find cancer in the final specimen she would ultimately require chemotherapy or other treatments.  My index of suspicion for this is low based on her current imaging however I can not say that with certainty until it has been evaluated pathologically.  I did discuss that I suspect that it will most likely show either low-grade or high-grade dysplasia.  We also discussed the need for evaluation of the margins at the time of surgery and the potential need for a total pancreatectomy if she has persistent high-grade dysplasia noted at the margins.  We discussed that a total pancreatectomy would remove the risk of pancreas cancer as well as pancreatic leak but would render her brittle diabetic with need for insulin management as well as lifetime supplementation with a pancreatic enzymes.    She understandably is a little overwhelmed by all of this.  She and her brother share responsibility for taking care of their aging mother.  Her brother is scheduled to undergo open heart surgery in 2 weeks and we will be out for several weeks if not several months.  I discussed with her that I certainly think it is reasonable to see her back in about 6 weeks so that we can discuss surgery further and when her  can be here so he would also ask questions.  We can schedule surgery for the month of May or or even June.  I have encouraged her in the meantime to really work on increasing her exercise capabilities and improving her nutrition to get into the best shape possible prior to surgery.    Plan:  -- recommend whipple sometime in the next few months  -- RTC in 6 weeks to discuss surgery and plan a date (this will allow time for her brother to have surgery and a better idea of when he will be available to take over care  of their mother)  -- we will work on getting the images from her previous hospitalization in 2013    Ms. Thompson expressed understanding in regards to our discussion today. Many good questions were asked on today's visit, all of which were answered to their satisfaction.    Follow-up: Follow up in about 6 weeks (around 4/15/2025).                  Yolie Lieberman MD, MS, FSSO  Board Certified Surgical Oncologist   [facility] [place], [place]  Office: [phone]       Communications: 60 minutes were spent on today's visit in face-to-face and non face-to-face time with the patient. This patient was recently diagnosed with main-duct IPMN and the time was required to provide counseling and guidance regarding their new diagnosis. Time was spent reviewing all outside records and information pertaining to their work-up and formulating a treatment plan in line with standardized guidelines. Additional time was spent communicating with referring physicians and facilities to facilitate the efficient exchange of previous healthcare records and radiographic imaging pertinent to the diagnosis and disease management.       No orders of the defined types were placed in this encounter.            [1]   Social History  Socioeconomic History    Marital status:      Spouse name: Shen Thompson    Number of children: 1   Tobacco Use    Smoking status: Never    Smokeless tobacco: Never   Substance and Sexual Activity    Alcohol use: Yes     Comment: occasionally    Drug use: No    Sexual activity: Yes     Partners: Male     Social Drivers of Health     Financial Resource Strain: Low Risk  (8/6/2024)    Overall Financial Resource Strain (CARDIA)     Difficulty of Paying Living Expenses: Not hard at all   Food Insecurity: No Food Insecurity (8/6/2024)    Hunger Vital Sign     Worried About Running Out of Food in the Last Year: Never true     Ran Out of Food in the Last Year: Never true   Transportation  Needs: No Transportation Needs (5/30/2023)    PRAPARE - Transportation     Lack of Transportation (Medical): No     Lack of Transportation (Non-Medical): No   Physical Activity: Sufficiently Active (8/6/2024)    Exercise Vital Sign     Days of Exercise per Week: 5 days     Minutes of Exercise per Session: 30 min   Stress: Stress Concern Present (8/6/2024)    [employer] [place] [employer] - Occupational Stress Questionnaire     Feeling of Stress : To some extent   Housing Stability: Unknown (5/30/2023)    Housing Stability Vital Sign     Unable to Pay for Housing in the Last Year: No     Unstable Housing in the Last Year: No

## 2025-03-04 NOTE — TELEPHONE ENCOUNTER
Faxed request for imaging to [facility] @ [phone]  Faxed request for imaging to [facility] @ [phone]  When I called to get the fax# I was told they are closed today

## 2025-03-05 ENCOUNTER — TELEPHONE (OUTPATIENT)
Dept: SURGICAL ONCOLOGY | Facility: CLINIC | Age: 65
End: 2025-03-05
Payer: COMMERCIAL

## 2025-03-05 NOTE — TELEPHONE ENCOUNTER
[facility] called to ask how we want imaging sent. They are faxing instructions on how to download them. She will fax all other chart info requested and send a disk

## 2025-03-10 DIAGNOSIS — F51.04 PSYCHOPHYSIOLOGICAL INSOMNIA: ICD-10-CM

## 2025-03-10 DIAGNOSIS — M62.838 MUSCLE SPASM: ICD-10-CM

## 2025-03-11 RX ORDER — TRAZODONE HYDROCHLORIDE 50 MG/1
50 TABLET ORAL NIGHTLY
Qty: 90 TABLET | Refills: 3 | Status: SHIPPED | OUTPATIENT
Start: 2025-03-11

## 2025-03-11 RX ORDER — CYCLOBENZAPRINE HCL 5 MG
5 TABLET ORAL 3 TIMES DAILY PRN
Qty: 60 TABLET | Refills: 2 | Status: SHIPPED | OUTPATIENT
Start: 2025-03-11

## 2025-03-11 NOTE — TELEPHONE ENCOUNTER
Refill Routing Note   Medication(s) are not appropriate for processing by [facility] for the following reason(s):        Outside of protocol    [facility] action(s):  Approve  Route             Appointments  past 12m or future 3m with PCP    Date Provider   Last Visit   2/11/2025 Yesica Burk MD   Next Visit   8/19/2025 Yesica Burk MD   ED visits in past 90 days: 0        Note composed:1:39 PM 03/11/2025

## 2025-03-11 NOTE — TELEPHONE ENCOUNTER
No care due was identified.  [facility] Embedded Care Due Messages. Reference number: [id number].   3/10/2025 8:52:58 PM CDT

## 2025-04-15 ENCOUNTER — TELEPHONE (OUTPATIENT)
Dept: SURGICAL ONCOLOGY | Facility: CLINIC | Age: 65
End: 2025-04-15
Payer: COMMERCIAL

## 2025-04-15 ENCOUNTER — OFFICE VISIT (OUTPATIENT)
Dept: SURGICAL ONCOLOGY | Facility: CLINIC | Age: 65
End: 2025-04-15
Payer: COMMERCIAL

## 2025-04-15 VITALS
BODY MASS INDEX: 31.57 KG/M2 | DIASTOLIC BLOOD PRESSURE: 91 MMHG | HEART RATE: 89 BPM | TEMPERATURE: 98 F | SYSTOLIC BLOOD PRESSURE: 135 MMHG | HEIGHT: 68 IN | WEIGHT: 208.31 LBS

## 2025-04-15 DIAGNOSIS — D49.0 INTRADUCTAL PAPILLARY MUCINOUS NEOPLASM OF PANCREAS: Primary | ICD-10-CM

## 2025-04-15 PROCEDURE — 99999 PR PBB SHADOW E&M-EST. PATIENT-LVL III: CPT | Mod: PBBFAC,,, | Performed by: SURGERY

## 2025-04-15 PROCEDURE — 3075F SYST BP GE 130 - 139MM HG: CPT | Mod: CPTII,S$GLB,, | Performed by: SURGERY

## 2025-04-15 PROCEDURE — 3080F DIAST BP >= 90 MM HG: CPT | Mod: CPTII,S$GLB,, | Performed by: SURGERY

## 2025-04-15 PROCEDURE — 99214 OFFICE O/P EST MOD 30 MIN: CPT | Mod: S$GLB,,, | Performed by: SURGERY

## 2025-04-15 PROCEDURE — 3008F BODY MASS INDEX DOCD: CPT | Mod: CPTII,S$GLB,, | Performed by: SURGERY

## 2025-04-15 PROCEDURE — 1159F MED LIST DOCD IN RCRD: CPT | Mod: CPTII,S$GLB,, | Performed by: SURGERY

## 2025-04-15 NOTE — PROGRESS NOTES
Surgical Oncology Clinic Note      Referring Provider: Dr. Abdi Mtz   PCP: Yesica Burk MD    Reason For Visit: Pancreas: cyst (IPMN)    HISTORY OF PRESENT ILLNESS     Chel Thompson is a 64 y.o. female with history of HTN who presents today for evaluation and management of main duct- IPMN.      She has a remote history of pancreatitis with 3 bouts of pancreatitis back in 2010.  She states that she was not drinking at that time and that nobody was ever able to tell her why she was having pancreatitis.  It sounds like it was shocked up has been idiopathic.  Then about 3 years later in 2013 she states she was on the way to work and suddenly had sudden onset of abdominal/chest pain was taken to the hospital and an extensive workup was done which ultimately revealed an additional episode of pancreatitis.  Around that time she had imaging done in the hospital either a CT or an MRI however is uncertain what it showed.  That was done at [facility].  She unfortunately does not have any records of that either.  Following that bout of pancreatitis she has never had any additional episodes or issues.    She was in her usual state of good health up until a few months ago when she started having right lower extremity pain for which she went to the [facility] in [place].  MRI of the lumbar spine was ordered which showed an evidence of degenerative changes but also showed what was concerning for a multilocular cystic mass in the head of the pancreas measuring approximately 4.4 cm which was incompletely characterized.  A subsequent MRI/MRCP of the abdomen was ordered and done on 11/26/2024.  This showed dilation of the main pancreatic duct in the pancreatic head and proximal body measuring up to 1.9 cm, increased compared to 2013.  There was also non masslike enhancement of the pancreatic head.  She was then referred to GI who she saw on 02/11/2025.  They referred her to  advanced endoscopy in [place] for an EUS.  That was done on 02/26/2025 by Dr. Mtz and revealed gaping ampulla with visible extrusion of mucin as well as dilated pancreatic duct in the pancreatic head in January of the pancreas measuring up to 20 mm in diameter, with tapering of and of the ductal dilatation in the body/tail.  There were no intraductal nodule seen on EUS in no pancreatic mass seen.    She is otherwise healthy.  She is a nonsmoker.  She drinks alcohol on rare occasions.  Family history is significant for prostate cancer in her brother as well as lung cancer in her maternal grandfather.  Otherwise no significant family history aside from what is noted below    Brother- prostate cancer, CAD   Materna grandfather=- lung cancer  Maternal grandmother- tumor in the eye  Maternal family is from [place] so limited information on them     INTERVAL HISTORY     04/15/2025:  She is doing well.  She has had no issues since our last visit.  Her brother successfully completed his operation and has been recovering well.  She presents to discuss the next steps of surgical planning.      Past Medical History:   Diagnosis Date    Hypertension     Hypertensive heart disease without heart failure 02/04/2021    Pancreatitis     4 times (2009,2013)    UTI (urinary tract infection)        Past Surgical History:   Procedure Laterality Date    COLONOSCOPY N/A 8/22/2024    Procedure: COLONOSCOPY;  Surgeon: Kya Garcia MD;  Location: [facility];  Service: Endoscopy;  Laterality: N/A;    DILATION AND CURETTAGE OF UTERUS      ENDOSCOPIC ULTRASOUND OF UPPER GASTROINTESTINAL TRACT N/A 2/26/2025    Procedure: ULTRASOUND, UPPER GI TRACT, ENDOSCOPIC;  Surgeon: Abdi Mtz MD;  Location: [facility];  Service: Endoscopy;  Laterality: N/A;  2/21 portal-EUS with anyone in next 2-3 weeks, either site should be ok. [id number]    MULTIPLE TOOTH EXTRACTIONS      vaginal delivery      varicose veins         Family History   Problem  Relation Name Age of Onset    Asthma Mother      Hypertension Mother      COPD Brother 3     Cancer Maternal Grandmother      Cancer Maternal Grandfather         Social History[1]       Medication List            Accurate as of April 15, 2025 11:59 PM. If you have any questions, ask your nurse or doctor.                CONTINUE taking these medications      cyclobenzaprine 5 MG tablet  Commonly known as: FLEXERIL  Take 1 tablet (5 mg total) by mouth 3 (three) times daily as needed for Muscle spasms.     gabapentin 100 MG capsule  Commonly known as: NEURONTIN  Take 1 capsule (100 mg total) by mouth every evening.     traZODone 50 MG tablet  Commonly known as: DESYREL  Take 1 tablet (50 mg total) by mouth every evening.     valsartan-hydrochlorothiazide 160-12.5 mg per tablet  Commonly known as: DIOVAN-HCT              Review of patient's allergies indicates:   Allergen Reactions    Dilaudid [hydromorphone] Itching     From IV    Nitrofurantoin Other (See Comments)     Cramping of legs       Review of Systems   Constitutional:  Negative for chills, fever, malaise/fatigue and weight loss.   HENT: Negative.     Respiratory:  Negative for cough and shortness of breath.    Cardiovascular:  Negative for chest pain, palpitations and orthopnea.   Gastrointestinal:  Negative for abdominal pain, blood in stool, constipation, diarrhea, nausea and vomiting.   Genitourinary:  Negative for frequency, hematuria and urgency.   Skin: Negative.  Negative for rash.   Neurological:  Negative for dizziness.   Psychiatric/Behavioral:  Negative for depression. The patient is not nervous/anxious.          PHYSICAL EXAM     Vitals:    04/15/25 1500   BP: (!) 135/91   Pulse: 89   Temp: 97.9 °F (36.6 °C)     Body mass index is 31.68 kg/m².  ECOG SCORE    0 - Fully active-able to carry on all pre-disease performance without restriction         Physical Exam  Vitals reviewed.   Constitutional:       General: She is not in acute distress.      "Appearance: Normal appearance.   HENT:      Head: Normocephalic and atraumatic.      Mouth/Throat:      Mouth: Mucous membranes are moist.   Eyes:      General: No scleral icterus.     Extraocular Movements: Extraocular movements intact.      Conjunctiva/sclera: Conjunctivae normal.   Pulmonary:      Effort: Pulmonary effort is normal.   Abdominal:      General: There is no distension.      Palpations: Abdomen is soft. There is no mass.      Tenderness: There is no abdominal tenderness.   Musculoskeletal:         General: No swelling. Normal range of motion.   Skin:     General: Skin is warm and dry.   Neurological:      General: No focal deficit present.      Mental Status: She is alert.   Psychiatric:         Mood and Affect: Mood normal.         Behavior: Behavior normal.         Thought Content: Thought content normal.         Judgment: Judgment normal.          DATA REVIEW:  I personally reviewed the following records for this visit: lab work from prior visit, notes from other physicians, magnetic resonance/MR imaging, surgical pathology results, endoscopy results, radiographic study evaluation, and laboratory results done by primary care physician    LABS       Lab Results   Component Value Date    WBC 5.30 08/06/2024    HGB 14.4 08/06/2024    HCT 45.4 08/06/2024     08/06/2024     Lab Results   Component Value Date    GLU 90 02/11/2025    CALCIUM 9.9 02/11/2025    ALBUMIN 4.1 02/11/2025    PROT 7.4 02/11/2025     02/11/2025    K 4.5 02/11/2025    CO2 26 02/11/2025     02/11/2025    BUN 33 (H) 02/11/2025    CREATININE 0.8 02/11/2025    ALKPHOS 60 02/11/2025    ALT 26 02/11/2025    AST 15 02/11/2025    BILITOT 0.5 02/11/2025       Nutritional:  No results found for: "PREALBUMIN"    Tumor Markers:  No results found for: "CEA", "AMYLASE", "ASPIRATE", "JWY347", "", "KX1765", "AFP", "AFPTM"  No results found for: ""    PATHOLOGY     None    IMAGING     11/19/2024:  MRI Lumbar " Spine  Impression:  1. Multilevel degenerative changes of the lumbar spine, as detailed above, resulting in mild neural foraminal and spinal canal stenosis at L2-3 through L4-5.  2. Multilocular cystic mass at the head of the pancreas, measuring on the order of 4.1 cm, incompletely characterized.  Recommend further evaluation with MRI/MRCP with and without contrast.  This report was flagged in Epic as abnormal.    11/26/2024:  MRI/ MRCP Abdomen  Impression:  1. Dilatation main pancreatic duct which appears increased to 2013.  Differential considerations include upstream dilatation the setting of scarring prior episodes of pancreatitis and main branch IPMN.  Recommend GI consultation and consideration for ERCP.  2. Hepatomegaly and hepatic steatosis.    02/26/2025:  EUS (Dr. Mtz)  No sign of significant and a sonographic abnormality in the common bile duct.  The pancreatic duct had a dilated and a sonographic parents in the pancreatic head in January of the pancreas.  The duct measured up to 20 mm in diameter, with tapering of the ductal dilatation in the body/tail.  There were no intraductal nodule seen on EUS.  No pancreatic mass was seen.  A gaping ampulla with visible extrusion of mucin was seen intra procedurally.    ASSESSMENT & PLAN     1. Intraductal papillary mucinous neoplasm of pancreas         Chel Thompson is a 64 y.o. female with newly discovered main duct IPMN.    I reviewed with her and her  and son, the diagnosis, natural history, and treatment of main duct IPMN.  I discussed with her that there are several different types of IPMN which may involve either the main pancreatic duct, the branch ducts, or both.  We discussed that branch duct IPMNs have a lower risk for developing a cancer (approximately 20% at 10 years), where as patients with IPMN involving the main doctor much higher risk (approximately 70%).  We reviewed the imaging from her EUS as well as her MRCP showing  dilation of the main duct up to 2 cm.  I also discussed with her the findings on EUS of the gaping ampulla with mucin extruding from it which is pathognomonic for a main duct IPMN.  At this time I see no evidence of a esau malignancy however she is young and otherwise healthy and certainly is high-risk over the next 10 years to develop a pancreatic malignancy.  I discussed with her that there was no good data to suggest at which point in time main duct IPMN can transition or how long they can be safely watched when they are greater than 10 mm.  We did discuss that the standard of care for main duct IPMN just to proceed with surgical resection.    We discussed the planned surgical intervention which would be a Whipple procedure which would involve removal of the duodenum, the head of the pancreas, and part of the end of the stomach and bile ducts was reconstruction of 3 different anastomosis including a pancreaticojejunostomy, hepaticojejunostomy, and gastrojejunostomy.  Do this anatomy out for her as well.  I did discuss with her that in general I expect that it will take her about 6 months to fully recover from this operation although by 6-8 weeks she should be feeling much more like herself again.  We discussed that this is a an open procedure in my hands she would expect anywhere from 7-14 days in the hospital depending on how she does.  She understands that approximately a 3rd of patients who did not have diabetes previously we will develop diabetes following the surgery, and about a 3rd who were on oral medications for diabetes would require insulin.  At this time she has no evidence of diabetes but understands that she may in the future require some medications for either into insulin management and or pancreatic enzyme replacement.    I also reviewed with her that the belief is that these IPMN to represent a field defect within the pancreas and that even if she undergoes a Whipple she will still require  continued surveillance after this.  In addition there is always a chance that after surgery they would find cancer in the final specimen she would ultimately require chemotherapy or other treatments.  My index of suspicion for this is low based on her current imaging however I can not say that with certainty until it has been evaluated pathologically.  I did discuss that I suspect that it will most likely show either low-grade or high-grade dysplasia.  We also discussed the need for evaluation of the margins at the time of surgery and the potential need for a total pancreatectomy if she has persistent high-grade dysplasia noted at the margins.  We discussed that a total pancreatectomy would remove the risk of pancreas cancer as well as pancreatic leak but would render her brittle diabetic with need for insulin management as well as lifetime supplementation with a pancreatic enzymes.    She was given materials regarding we will postop care, the Whipple procedure, and expected postoperative course from surgery to review.  I will plan to see her back around the end of May to sign consents and discuss any additional questions she may have at that time.    Plan:  -- Plan for whipple first or 2nd week fo June- date TBD  -- POS[facility]     Ms. Thompson expressed understanding in regards to our discussion today. Many good questions were asked on today's visit, all of which were answered to their satisfaction.    Follow-up: Follow up in about 5 weeks (around 5/21/2025).                  Yolie Lieberman MD, MS, [facility]  Board Certified Surgical Oncologist   [facility] [place], [place]  Office: [phone]       Communications: 30 minutes were spent on today's visit in face-to-face and non face-to-face time with the patient. This patient was recently diagnosed with main-duct IPMN and the time was required to provide counseling and guidance regarding their new diagnosis. Time was spent reviewing all outside  records and information pertaining to their work-up and formulating a treatment plan in line with standardized guidelines. Additional time was spent communicating with referring physicians and facilities to facilitate the efficient exchange of previous healthcare records and radiographic imaging pertinent to the diagnosis and disease management.       No orders of the defined types were placed in this encounter.              [1]   Social History  Socioeconomic History    Marital status:      Spouse name: Shen Thompson    Number of children: 1   Tobacco Use    Smoking status: Never    Smokeless tobacco: Never   Substance and Sexual Activity    Alcohol use: Yes     Comment: occasionally    Drug use: No    Sexual activity: Yes     Partners: Male     Social Drivers of Health     Financial Resource Strain: Low Risk  (8/6/2024)    Overall Financial Resource Strain (CARDIA)     Difficulty of Paying Living Expenses: Not hard at all   Food Insecurity: No Food Insecurity (8/6/2024)    Hunger Vital Sign     Worried About Running Out of Food in the Last Year: Never true     Ran Out of Food in the Last Year: Never true   Transportation Needs: No Transportation Needs (5/30/2023)    PRAPARE - Transportation     Lack of Transportation (Medical): No     Lack of Transportation (Non-Medical): No   Physical Activity: Sufficiently Active (8/6/2024)    Exercise Vital Sign     Days of Exercise per Week: 5 days     Minutes of Exercise per Session: 30 min   Stress: Stress Concern Present (8/6/2024)    [employer] - Occupational Stress Questionnaire     Feeling of Stress : To some extent   Housing Stability: Unknown (5/30/2023)    Housing Stability Vital Sign     Unable to Pay for Housing in the Last Year: No     Unstable Housing in the Last Year: No

## 2025-04-15 NOTE — TELEPHONE ENCOUNTER
Received call from [facility]. They are affiliated with [facility] so we should have all of their imaging and path

## 2025-04-23 ENCOUNTER — PATIENT MESSAGE (OUTPATIENT)
Dept: SURGICAL ONCOLOGY | Facility: CLINIC | Age: 65
End: 2025-04-23
Payer: COMMERCIAL

## 2025-04-23 DIAGNOSIS — D49.0 INTRADUCTAL PAPILLARY MUCINOUS NEOPLASM OF PANCREAS: Primary | ICD-10-CM

## 2025-04-23 DIAGNOSIS — D49.0 IPMN (INTRADUCTAL PAPILLARY MUCINOUS NEOPLASM): ICD-10-CM

## 2025-04-23 RX ORDER — SODIUM CHLORIDE 9 MG/ML
INJECTION, SOLUTION INTRAVENOUS CONTINUOUS
OUTPATIENT
Start: 2025-04-23

## 2025-04-23 RX ORDER — HEPARIN SODIUM 5000 [USP'U]/ML
5000 INJECTION, SOLUTION INTRAVENOUS; SUBCUTANEOUS EVERY 8 HOURS
OUTPATIENT
Start: 2025-04-23

## 2025-04-28 ENCOUNTER — PATIENT MESSAGE (OUTPATIENT)
Dept: SURGICAL ONCOLOGY | Facility: CLINIC | Age: 65
End: 2025-04-28
Payer: COMMERCIAL

## 2025-05-02 ENCOUNTER — TELEPHONE (OUTPATIENT)
Dept: SURGICAL ONCOLOGY | Facility: CLINIC | Age: 65
End: 2025-05-02
Payer: COMMERCIAL

## 2025-05-02 NOTE — TELEPHONE ENCOUNTER
Returned pt's call concerning STD paperwork. Explained our protocol, 10 working day turnaround expectation and asked her to have the STD company reach out to me for further explanation as pt has been told she has to submit her paperwork almost 2 weeks before her surgery is scheduled. Pt verbalized understanding and is calling them now. She will have them reach out to us if they cannot get on the same page about the paperwork due date.

## 2025-05-26 ENCOUNTER — TELEPHONE (OUTPATIENT)
Dept: PREADMISSION TESTING | Facility: HOSPITAL | Age: 65
End: 2025-05-26
Payer: COMMERCIAL

## 2025-05-26 DIAGNOSIS — D49.0 INTRADUCTAL PAPILLARY MUCINOUS NEOPLASM OF PANCREAS: Primary | ICD-10-CM

## 2025-05-26 NOTE — PRE-PROCEDURE INSTRUCTIONS
Pre op instructions reviewed with PATIENT over telephone, verbalized understanding.    Procedure Date: 5/29  Arrival Time:  TBD; We will call you after 2pm the day before your procedure with your arrival time.    Address:   [facility] (Off [facility], [address] [place] [address] on the left)  [address] [place] [address] [place]. [address]  >>>Please enter through front entrance Lobby of 1st floor to Registration desk<<<    !!!INSTRUCTIONS IMPORTANT!!!  NO FOOD, DRINK OR TOBACCO PRODUCTS AFTER MIDNIGHT THE NIGHT BEFORE SURGERY.     Do not eat after 12 midnight, Do not smoke or use chewing tobacco after 12 midnight!  OK to brush teeth, but no gum, candy, or mints!      >>>MEDICATION INSTRUCTIONS<<<: Morning of Surgery, take small sip of water with ONLY these medications:  NONE      *ADHD Medication: Stop taking ADHD/ADD medications 48 hrs prior to surgery, as this can affect the anesthesia used.     *Diabetic/ Prediabetic Patients: !!!If you take diabetic or weight loss medication, Do NOT take morning of surgery unless instructed by Doctor!!!  Metformin to be stopped 24 hrs prior to surgery.   Long Acting Insulin Instructions: HOLD the night before surgery unless instructed differently by Provider!  Ozempic/ Mounjaro/ Wegovy/ Trulicity/ Semaglutide injections or weight loss medication to be stopped 7 days prior to surgery.    If you take Ozempic/ Mounjaro / Wegovy / Trulicity / Semaglutide, any weight loss injections OR PHENTERMINE --->>> PLEASE LET US KNOW IMMEDIATELY, as these medications need to be stopped 7 days prior to surgery!      !!!STOP ALL Aspirins, NSAIDS, WEIGHT LOSS INJECTIONS/PILLS, Herbal supplements, & Vitamins 7 DAYS BEFORE SURGERY!!!    ____  Avoid Alcoholic beverages 3 days prior to surgery, as it can thin the blood.  ____  NO Acrylic/fake nails or nail polish worn day of surgery (specifically hand/arm & foot surgeries).  ____  NO powder, lotions, deodorants, oils or cream on body.  ____  Remove all  jewelry & piercings & foreign objects before arrival & leave at home.  ____  Remove Dentures, Hearing Aids & Contact Lens prior to surgery.  ____  Bring photo ID and insurance information to hospital (Leave Valuables at Home).  ____  If going home the same day, arrange for a ride home. You will not be able to drive for 24 hrs if Anesthesia was used.   ____  Females (ages 11-60): may need to give a urine sample the morning of surgery; please see Pre op Nurse prior to using the restroom.  ____  Males: Stop ED medications (Viagra, Cialis) 24 hrs prior to surgery.  ____  Wear clean, loose fitting clothing to allow for dressings/ bandages.      Bathing Instructions:    -Shower with anti-bacterial Soap (Hibiclens or Dial) the night before surgery and the morning of.   -Do not use Hibiclens on your face or genitals.   -Apply clean clothes after shower.  -Do not shave your face or body 2 days prior to surgery unless instructed otherwise by your Surgeon.  -Do not shave your pubic area 7 days prior to surgery (GYN PATIENTS)    [facility] Visitor/Ride Policy:  Only 2 adults allowed in pre op/recovery area during your procedure. You MUST HAVE A RIDE HOME from a responsible adult that you know and trust. Medical Transport, Uber or Lyft can ONLY be used if patient has a responsible adult to accompany them during ride home.       *Signs and symptoms of Infection Before or After Surgery:               !!!If you experience any fever, chills, nausea/ vomiting, foul odor/ excessive drainage from surgical site, flu-like symptoms, new wounds or cuts, PLEASE CALL THE SURGEON OFFICE at [phone] or SEND MESSAGE THROUGH [employer] PORTAL!!!     *If you are running late the morning of surgery, please call the Hospital Surgery Dept @ [phone].     *Billing questions:  [phone]     Thank you,  -[facility] Pre Admit Dept.  [phone] or [phone]  M-F 7:30 am-4:00 pm (Closed Major Holidays)

## 2025-05-27 ENCOUNTER — LAB VISIT (OUTPATIENT)
Dept: LAB | Facility: HOSPITAL | Age: 65
End: 2025-05-27
Attending: SURGERY
Payer: COMMERCIAL

## 2025-05-27 ENCOUNTER — OFFICE VISIT (OUTPATIENT)
Dept: SURGICAL ONCOLOGY | Facility: CLINIC | Age: 65
End: 2025-05-27
Payer: COMMERCIAL

## 2025-05-27 VITALS
HEART RATE: 79 BPM | SYSTOLIC BLOOD PRESSURE: 133 MMHG | TEMPERATURE: 98 F | HEIGHT: 68 IN | BODY MASS INDEX: 31.29 KG/M2 | WEIGHT: 206.44 LBS | DIASTOLIC BLOOD PRESSURE: 88 MMHG

## 2025-05-27 DIAGNOSIS — D49.0 IPMN (INTRADUCTAL PAPILLARY MUCINOUS NEOPLASM): Primary | ICD-10-CM

## 2025-05-27 DIAGNOSIS — D49.0 IPMN (INTRADUCTAL PAPILLARY MUCINOUS NEOPLASM): ICD-10-CM

## 2025-05-27 PROBLEM — A60.09 HERPES GENITALIS IN WOMEN: Status: ACTIVE | Noted: 2025-05-27

## 2025-05-27 LAB
CREAT SERPL-MCNC: 0.8 MG/DL (ref 0.5–1.4)
GFR SERPLBLD CREATININE-BSD FMLA CKD-EPI: >60 ML/MIN/1.73/M2

## 2025-05-27 PROCEDURE — 82565 ASSAY OF CREATININE: CPT

## 2025-05-27 PROCEDURE — 3008F BODY MASS INDEX DOCD: CPT | Mod: CPTII,S$GLB,, | Performed by: SURGERY

## 2025-05-27 PROCEDURE — 36415 COLL VENOUS BLD VENIPUNCTURE: CPT

## 2025-05-27 PROCEDURE — 3079F DIAST BP 80-89 MM HG: CPT | Mod: CPTII,S$GLB,, | Performed by: SURGERY

## 2025-05-27 PROCEDURE — 99999 PR PBB SHADOW E&M-EST. PATIENT-LVL III: CPT | Mod: PBBFAC,,, | Performed by: SURGERY

## 2025-05-27 PROCEDURE — 3075F SYST BP GE 130 - 139MM HG: CPT | Mod: CPTII,S$GLB,, | Performed by: SURGERY

## 2025-05-27 PROCEDURE — 1159F MED LIST DOCD IN RCRD: CPT | Mod: CPTII,S$GLB,, | Performed by: SURGERY

## 2025-05-27 PROCEDURE — 99213 OFFICE O/P EST LOW 20 MIN: CPT | Mod: S$GLB,,, | Performed by: SURGERY

## 2025-05-27 NOTE — PROGRESS NOTES
Surgical Oncology Clinic Note      Referring Provider: Dr. Abdi Mtz   PCP: Yesica Burk MD    Reason For Visit: Pancreas: cyst (IPMN)    HISTORY OF PRESENT ILLNESS     Chel Thompson is a 64 y.o. female with history of HTN who presents today for evaluation and management of main duct- IPMN.      She has a remote history of pancreatitis with 3 bouts of pancreatitis back in 2010.  She states that she was not drinking at that time and that nobody was ever able to tell her why she was having pancreatitis.  It sounds like it was shocked up has been idiopathic.  Then about 3 years later in 2013 she states she was on the way to work and suddenly had sudden onset of abdominal/chest pain was taken to the hospital and an extensive workup was done which ultimately revealed an additional episode of pancreatitis.  Around that time she had imaging done in the hospital either a CT or an MRI however is uncertain what it showed.  That was done at [facility].  She unfortunately does not have any records of that either.  Following that bout of pancreatitis she has never had any additional episodes or issues.    She was in her usual state of good health up until a few months ago when she started having right lower extremity pain for which she went to [facility] in [place].  MRI of the lumbar spine was ordered which showed an evidence of degenerative changes but also showed what was concerning for a multilocular cystic mass in the head of the pancreas measuring approximately 4.4 cm which was incompletely characterized.  A subsequent MRI/MRCP of the abdomen was ordered and done on 11/26/2024.  This showed dilation of the main pancreatic duct in the pancreatic head and proximal body measuring up to 1.9 cm, increased compared to 2013.  There was also non masslike enhancement of the pancreatic head.  She was then referred to GI who she saw on 02/11/2025.  They referred her to  advanced endoscopy in [place] for an EUS.  That was done on 02/26/2025 by Dr. Mtz and revealed gaping ampulla with visible extrusion of mucin as well as dilated pancreatic duct in the pancreatic head in January of the pancreas measuring up to 20 mm in diameter, with tapering of and of the ductal dilatation in the body/tail.  There were no intraductal nodule seen on EUS in no pancreatic mass seen.    She is otherwise healthy.  She is a nonsmoker.  She drinks alcohol on rare occasions.  Family history is significant for prostate cancer in her brother as well as lung cancer in her maternal grandfather.  Otherwise no significant family history aside from what is noted below    Brother- prostate cancer, CAD   Materna grandfather=- lung cancer  Maternal grandmother- tumor in the eye  Maternal family is from [place] so limited information on them     INTERVAL HISTORY     04/15/2025:  She is doing well.  She has had no issues since our last visit.  Her brother successfully completed his operation and has been recovering well.  She presents to discuss the next steps of surgical planning.      5/27/2025:  doing well.  No concerns at this time.        Past Medical History:   Diagnosis Date    Herpes genitalis in women 05/27/2025    Hypertension     Hypertensive heart disease without heart failure 02/04/2021    Intraductal papillary mucinous neoplasm of pancreas 03/04/2025    Pancreatitis     4 times (2009,2013)    UTI (urinary tract infection)        Past Surgical History:   Procedure Laterality Date    COLONOSCOPY N/A 8/22/2024    Procedure: COLONOSCOPY;  Surgeon: Kya Garcia MD;  Location: [facility] ENDO;  Service: Endoscopy;  Laterality: N/A;    DILATION AND CURETTAGE OF UTERUS      ENDOSCOPIC ULTRASOUND OF UPPER GASTROINTESTINAL TRACT N/A 2/26/2025    Procedure: ULTRASOUND, UPPER GI TRACT, ENDOSCOPIC;  Surgeon: Abdi Mtz MD;  Location: [facility] ENDO;  Service: Endoscopy;  Laterality: N/A;  2/21 portal-EUS with  anyone in next 2-3 weeks, either site should be ok. [email or web address]    MULTIPLE TOOTH EXTRACTIONS      vaginal delivery      varicose veins         Family History   Problem Relation Name Age of Onset    Asthma Mother      Hypertension Mother      COPD Brother 3     Cancer Maternal Grandmother      Cancer Maternal Grandfather         Social History[1]       Medication List            Accurate as of May 27, 2025 12:58 PM. If you have any questions, ask your nurse or doctor.                CONTINUE taking these medications      cyclobenzaprine 5 MG tablet  Commonly known as: FLEXERIL  Take 1 tablet (5 mg total) by mouth 3 (three) times daily as needed for Muscle spasms.     traZODone 50 MG tablet  Commonly known as: DESYREL  Take 1 tablet (50 mg total) by mouth every evening.     valsartan-hydrochlorothiazide 160-12.5 mg per tablet  Commonly known as: DIOVAN-HCT              Review of patient's allergies indicates:   Allergen Reactions    Dilaudid [hydromorphone] Itching     From IV    Nitrofurantoin Other (See Comments)     Cramping of legs       Review of Systems   Constitutional:  Negative for chills, fever, malaise/fatigue and weight loss.   HENT: Negative.     Respiratory:  Negative for cough and shortness of breath.    Cardiovascular:  Negative for chest pain, palpitations and orthopnea.   Gastrointestinal:  Negative for abdominal pain, blood in stool, constipation, diarrhea, nausea and vomiting.   Genitourinary:  Negative for frequency, hematuria and urgency.   Skin: Negative.  Negative for rash.   Neurological:  Negative for dizziness.   Psychiatric/Behavioral:  Negative for depression. The patient is not nervous/anxious.          PHYSICAL EXAM     Vitals:    05/27/25 1100   BP: 133/88   Pulse: 79   Temp: 98 °F (36.7 °C)     Body mass index is 31.39 kg/m².  ECOG SCORE    0 - Fully active-able to carry on all pre-disease performance without restriction         Physical Exam  Vitals reviewed.   Constitutional:        "General: She is not in acute distress.     Appearance: Normal appearance.   HENT:      Head: Normocephalic and atraumatic.      Mouth/Throat:      Mouth: Mucous membranes are moist.   Eyes:      General: No scleral icterus.     Extraocular Movements: Extraocular movements intact.      Conjunctiva/sclera: Conjunctivae normal.   Pulmonary:      Effort: Pulmonary effort is normal.   Abdominal:      General: There is no distension.      Palpations: Abdomen is soft. There is no mass.      Tenderness: There is no abdominal tenderness.   Musculoskeletal:         General: No swelling. Normal range of motion.   Skin:     General: Skin is warm and dry.   Neurological:      General: No focal deficit present.      Mental Status: She is alert.   Psychiatric:         Mood and Affect: Mood normal.         Behavior: Behavior normal.         Thought Content: Thought content normal.         Judgment: Judgment normal.          DATA REVIEW:  I personally reviewed the following records for this visit: lab work from prior visit, notes from other physicians, magnetic resonance/MR imaging, surgical pathology results, endoscopy results, radiographic study evaluation, and laboratory results done by primary care physician    LABS       Lab Results   Component Value Date    WBC 5.30 08/06/2024    HGB 14.4 08/06/2024    HCT 45.4 08/06/2024     08/06/2024     Lab Results   Component Value Date    GLU 90 02/11/2025    CALCIUM 9.9 02/11/2025    ALBUMIN 4.1 02/11/2025    PROT 7.4 02/11/2025     02/11/2025    K 4.5 02/11/2025    CO2 26 02/11/2025     02/11/2025    BUN 33 (H) 02/11/2025    CREATININE 0.8 05/27/2025    ALKPHOS 60 02/11/2025    ALT 26 02/11/2025    AST 15 02/11/2025    BILITOT 0.5 02/11/2025       Nutritional:  No results found for: "PREALBUMIN"    Tumor Markers:  No results found for: "CEA", "AMYLASE", "ASPIRATE", "PME501", "", "ZI3011", "AFP", "AFPTM"  No results found for: ""    PATHOLOGY "     None    IMAGING     11/19/2024:  MRI Lumbar Spine  Impression:  1. Multilevel degenerative changes of the lumbar spine, as detailed above, resulting in mild neural foraminal and spinal canal stenosis at L2-3 through L4-5.  2. Multilocular cystic mass at the head of the pancreas, measuring on the order of 4.1 cm, incompletely characterized.  Recommend further evaluation with MRI/MRCP with and without contrast.  This report was flagged in Epic as abnormal.    11/26/2024:  MRI/ MRCP Abdomen  Impression:  1. Dilatation main pancreatic duct which appears increased to 2013.  Differential considerations include upstream dilatation the setting of scarring prior episodes of pancreatitis and main branch IPMN.  Recommend GI consultation and consideration for ERCP.  2. Hepatomegaly and hepatic steatosis.    02/26/2025:  EUS (Dr. Mtz)  No sign of significant and a sonographic abnormality in the common bile duct.  The pancreatic duct had a dilated and a sonographic parents in the pancreatic head in January of the pancreas.  The duct measured up to 20 mm in diameter, with tapering of the ductal dilatation in the body/tail.  There were no intraductal nodule seen on EUS.  No pancreatic mass was seen.  A gaping ampulla with visible extrusion of mucin was seen intra procedurally.    ASSESSMENT & PLAN     1. IPMN (intraductal papillary mucinous neoplasm)         Chel Thompson is a 64 y.o. female with newly discovered main duct IPMN.    Risks and benefits of pancreaticoduodenectomy (Whipple) including death, bleeding, infection, scar, pain/numbness, margin positivity, discovery of additional disease, anastomotic leakage, pancreatic leakage/fistula, delayed gastric emptying, damage to local structures, need for additional procedures based on operative findings and imponderables were all reviewed.  We reviewed the role of checking margins for high grade dysplasia and the possibility of needing a total pancreatectomy  which would render her a brittle diabetic.  We also discussed the possibility of there being cancer in final pathology, which would result in her needing chemo.      She was given the opportunity to ask questions, which were all addressed.  She voiced understanding and wishes to proceed.   Consent signed in clinic today     Plan:  -- Open Whipple, possible total pancreatectomy on Thursday, March 29th  -- POSH tomorrow  -- CT Pancreas protocol today for surgical planning purposes    Ms. Thompson expressed understanding in regards to our discussion today. Many good questions were asked on today's visit, all of which were answered to their satisfaction.    Follow-up: Follow up in about 2 days (around 5/29/2025).                  Yolie Lieberman MD, MS, [facility]  Board Certified Surgical Oncologist   [facility]- [place], [place]  Office: [phone]       Communications: 20 minutes were spent on today's visit in face-to-face and non face-to-face time with the patient. This patient was recently diagnosed with main-duct IPMN and the time was required to provide counseling and guidance regarding their new diagnosis. Time was spent reviewing all outside records and information pertaining to their work-up and formulating a treatment plan in line with standardized guidelines. Additional time was spent communicating with referring physicians and facilities to facilitate the efficient exchange of previous healthcare records and radiographic imaging pertinent to the diagnosis and disease management.       Orders Placed This Encounter   Procedures    CT Abdomen Pelvis W Wo Contrast     Standing Status:   Future     Expected Date:   5/27/2025     Expiration Date:   5/27/2026     Is the patient allergic to iodine contrast?:   No     Does this patient have impaired renal function?:   No     Diabetes?:   No     May the Radiologist modify the order per protocol to meet the clinical needs of the patient?:   Yes      Oral/Rectal Contrast instructions::   Routine Oral Contrast     Special CT ABD Protocol Request?:   Pancreas Protocol    Creatinine, serum     Standing Status:   Future     Number of Occurrences:   1     Expected Date:   5/27/2025     Expiration Date:   8/25/2026     Send normal result to authorizing provider's In Basket if patient is active on MyChart::   Yes                 [1]   Social History  Socioeconomic History    Marital status:      Spouse name: Shen Thompson    Number of children: 1   Tobacco Use    Smoking status: Never    Smokeless tobacco: Never   Substance and Sexual Activity    Alcohol use: Not Currently     Comment: occasionally    Drug use: No    Sexual activity: Yes     Partners: Male     Social Drivers of Health     Financial Resource Strain: Low Risk  (8/6/2024)    Overall Financial Resource Strain (CARDIA)     Difficulty of Paying Living Expenses: Not hard at all   Food Insecurity: No Food Insecurity (8/6/2024)    Hunger Vital Sign     Worried About Running Out of Food in the Last Year: Never true     Ran Out of Food in the Last Year: Never true   Transportation Needs: No Transportation Needs (5/30/2023)    PRAPARE - Transportation     Lack of Transportation (Medical): No     Lack of Transportation (Non-Medical): No   Physical Activity: Sufficiently Active (8/6/2024)    Exercise Vital Sign     Days of Exercise per Week: 5 days     Minutes of Exercise per Session: 30 min   Stress: Stress Concern Present (8/6/2024)    [employer] - Occupational Stress Questionnaire     Feeling of Stress : To some extent   Housing Stability: Unknown (5/30/2023)    Housing Stability Vital Sign     Unable to Pay for Housing in the Last Year: No     Unstable Housing in the Last Year: No

## 2025-05-27 NOTE — H&P (VIEW-ONLY)
Surgical Oncology Clinic Note      Referring Provider: Dr. Abdi Mtz   PCP: Yesica Burk MD    Reason For Visit: Pancreas: cyst (IPMN)    HISTORY OF PRESENT ILLNESS     Chel Thompson is a 64 y.o. female with history of HTN who presents today for evaluation and management of main duct- IPMN.      She has a remote history of pancreatitis with 3 bouts of pancreatitis back in 2010.  She states that she was not drinking at that time and that nobody was ever able to tell her why she was having pancreatitis.  It sounds like it was shocked up has been idiopathic.  Then about 3 years later in 2013 she states she was on the way to work and suddenly had sudden onset of abdominal/chest pain was taken to the hospital and an extensive workup was done which ultimately revealed an additional episode of pancreatitis.  Around that time she had imaging done in the hospital either a CT or an MRI however is uncertain what it showed.  That was done at [facility].  She unfortunately does not have any records of that either.  Following that bout of pancreatitis she has never had any additional episodes or issues.    She was in her usual state of good health up until a few months ago when she started having right lower extremity pain for which she went to the [facility] in [place].  MRI of the lumbar spine was ordered which showed an evidence of degenerative changes but also showed what was concerning for a multilocular cystic mass in the head of the pancreas measuring approximately 4.4 cm which was incompletely characterized.  A subsequent MRI/MRCP of the abdomen was ordered and done on 11/26/2024.  This showed dilation of the main pancreatic duct in the pancreatic head and proximal body measuring up to 1.9 cm, increased compared to 2013.  There was also non masslike enhancement of the pancreatic head.  She was then referred to GI who she saw on 02/11/2025.  They referred her to  advanced endoscopy in [place] for an EUS.  That was done on 02/26/2025 by Dr. Mtz and revealed gaping ampulla with visible extrusion of mucin as well as dilated pancreatic duct in the pancreatic head in January of the pancreas measuring up to 20 mm in diameter, with tapering of and of the ductal dilatation in the body/tail.  There were no intraductal nodule seen on EUS in no pancreatic mass seen.    She is otherwise healthy.  She is a nonsmoker.  She drinks alcohol on rare occasions.  Family history is significant for prostate cancer in her brother as well as lung cancer in her maternal grandfather.  Otherwise no significant family history aside from what is noted below    Brother- prostate cancer, CAD   Materna grandfather=- lung cancer  Maternal grandmother- tumor in the eye  Maternal family is from [place] so limited information on them     INTERVAL HISTORY     04/15/2025:  She is doing well.  She has had no issues since our last visit.  Her brother successfully completed his operation and has been recovering well.  She presents to discuss the next steps of surgical planning.      5/27/2025:  doing well.  No concerns at this time.        Past Medical History:   Diagnosis Date    Herpes genitalis in women 05/27/2025    Hypertension     Hypertensive heart disease without heart failure 02/04/2021    Intraductal papillary mucinous neoplasm of pancreas 03/04/2025    Pancreatitis     4 times (2009,2013)    UTI (urinary tract infection)        Past Surgical History:   Procedure Laterality Date    COLONOSCOPY N/A 8/22/2024    Procedure: COLONOSCOPY;  Surgeon: Kya Garcia MD;  Location: [facility] ENDO;  Service: Endoscopy;  Laterality: N/A;    DILATION AND CURETTAGE OF UTERUS      ENDOSCOPIC ULTRASOUND OF UPPER GASTROINTESTINAL TRACT N/A 2/26/2025    Procedure: ULTRASOUND, UPPER GI TRACT, ENDOSCOPIC;  Surgeon: Abdi Mtz MD;  Location: [facility] ENDO;  Service: Endoscopy;  Laterality: N/A;  2/21 portal-EUS with  anyone in next 2-3 weeks, either site should be ok. [id number]    MULTIPLE TOOTH EXTRACTIONS      vaginal delivery      varicose veins         Family History   Problem Relation Name Age of Onset    Asthma Mother      Hypertension Mother      COPD Brother 3     Cancer Maternal Grandmother      Cancer Maternal Grandfather         Social History[1]       Medication List            Accurate as of May 27, 2025 12:58 PM. If you have any questions, ask your nurse or doctor.                CONTINUE taking these medications      cyclobenzaprine 5 MG tablet  Commonly known as: FLEXERIL  Take 1 tablet (5 mg total) by mouth 3 (three) times daily as needed for Muscle spasms.     traZODone 50 MG tablet  Commonly known as: DESYREL  Take 1 tablet (50 mg total) by mouth every evening.     valsartan-hydrochlorothiazide 160-12.5 mg per tablet  Commonly known as: DIOVAN-HCT              Review of patient's allergies indicates:   Allergen Reactions    Dilaudid [hydromorphone] Itching     From IV    Nitrofurantoin Other (See Comments)     Cramping of legs       Review of Systems   Constitutional:  Negative for chills, fever, malaise/fatigue and weight loss.   HENT: Negative.     Respiratory:  Negative for cough and shortness of breath.    Cardiovascular:  Negative for chest pain, palpitations and orthopnea.   Gastrointestinal:  Negative for abdominal pain, blood in stool, constipation, diarrhea, nausea and vomiting.   Genitourinary:  Negative for frequency, hematuria and urgency.   Skin: Negative.  Negative for rash.   Neurological:  Negative for dizziness.   Psychiatric/Behavioral:  Negative for depression. The patient is not nervous/anxious.          PHYSICAL EXAM     Vitals:    05/27/25 1100   BP: 133/88   Pulse: 79   Temp: 98 °F (36.7 °C)     Body mass index is 31.39 kg/m².  ECOG SCORE    0 - Fully active-able to carry on all pre-disease performance without restriction         Physical Exam  Vitals reviewed.   Constitutional:        "General: She is not in acute distress.     Appearance: Normal appearance.   HENT:      Head: Normocephalic and atraumatic.      Mouth/Throat:      Mouth: Mucous membranes are moist.   Eyes:      General: No scleral icterus.     Extraocular Movements: Extraocular movements intact.      Conjunctiva/sclera: Conjunctivae normal.   Pulmonary:      Effort: Pulmonary effort is normal.   Abdominal:      General: There is no distension.      Palpations: Abdomen is soft. There is no mass.      Tenderness: There is no abdominal tenderness.   Musculoskeletal:         General: No swelling. Normal range of motion.   Skin:     General: Skin is warm and dry.   Neurological:      General: No focal deficit present.      Mental Status: She is alert.   Psychiatric:         Mood and Affect: Mood normal.         Behavior: Behavior normal.         Thought Content: Thought content normal.         Judgment: Judgment normal.          DATA REVIEW:  I personally reviewed the following records for this visit: lab work from prior visit, notes from other physicians, magnetic resonance/MR imaging, surgical pathology results, endoscopy results, radiographic study evaluation, and laboratory results done by primary care physician    LABS       Lab Results   Component Value Date    WBC 5.30 08/06/2024    HGB 14.4 08/06/2024    HCT 45.4 08/06/2024     08/06/2024     Lab Results   Component Value Date    GLU 90 02/11/2025    CALCIUM 9.9 02/11/2025    ALBUMIN 4.1 02/11/2025    PROT 7.4 02/11/2025     02/11/2025    K 4.5 02/11/2025    CO2 26 02/11/2025     02/11/2025    BUN 33 (H) 02/11/2025    CREATININE 0.8 05/27/2025    ALKPHOS 60 02/11/2025    ALT 26 02/11/2025    AST 15 02/11/2025    BILITOT 0.5 02/11/2025       Nutritional:  No results found for: "PREALBUMIN"    Tumor Markers:  No results found for: "CEA", "AMYLASE", "ASPIRATE", "NTA817", "", "JQ9399", "AFP", "AFPTM"  No results found for: ""    PATHOLOGY "     None    IMAGING     11/19/2024:  MRI Lumbar Spine  Impression:  1. Multilevel degenerative changes of the lumbar spine, as detailed above, resulting in mild neural foraminal and spinal canal stenosis at L2-3 through L4-5.  2. Multilocular cystic mass at the head of the pancreas, measuring on the order of 4.1 cm, incompletely characterized.  Recommend further evaluation with MRI/MRCP with and without contrast.  This report was flagged in Epic as abnormal.    11/26/2024:  MRI/ MRCP Abdomen  Impression:  1. Dilatation main pancreatic duct which appears increased to 2013.  Differential considerations include upstream dilatation the setting of scarring prior episodes of pancreatitis and main branch IPMN.  Recommend GI consultation and consideration for ERCP.  2. Hepatomegaly and hepatic steatosis.    02/26/2025:  EUS (Dr. Mtz)  No sign of significant and a sonographic abnormality in the common bile duct.  The pancreatic duct had a dilated and a sonographic parents in the pancreatic head in January of the pancreas.  The duct measured up to 20 mm in diameter, with tapering of the ductal dilatation in the body/tail.  There were no intraductal nodule seen on EUS.  No pancreatic mass was seen.  A gaping ampulla with visible extrusion of mucin was seen intra procedurally.    ASSESSMENT & PLAN     1. IPMN (intraductal papillary mucinous neoplasm)         Chel Thompson is a 64 y.o. female with newly discovered main duct IPMN.    Risks and benefits of pancreaticoduodenectomy (Whipple) including death, bleeding, infection, scar, pain/numbness, margin positivity, discovery of additional disease, anastomotic leakage, pancreatic leakage/fistula, delayed gastric emptying, damage to local structures, need for additional procedures based on operative findings and imponderables were all reviewed.  We reviewed the role of checking margins for high grade dysplasia and the possibility of needing a total pancreatectomy  which would render her a brittle diabetic.  We also discussed the possibility of there being cancer in final pathology, which would result in her needing chemo.      She was given the opportunity to ask questions, which were all addressed.  She voiced understanding and wishes to proceed.   Consent signed in clinic today     Plan:  -- Open Whipple, possible total pancreatectomy on Thursday, March 29th  -- POSH tomorrow  -- CT Pancreas protocol today for surgical planning purposes    Ms. Thompson expressed understanding in regards to our discussion today. Many good questions were asked on today's visit, all of which were answered to their satisfaction.    Follow-up: Follow up in about 2 days (around 5/29/2025).                  Yolie Lieberman MD, MS, [facility]  Board Certified Surgical Oncologist   [facility] [place], [place]  Office: [phone]       Communications: 20 minutes were spent on today's visit in face-to-face and non face-to-face time with the patient. This patient was recently diagnosed with main-duct IPMN and the time was required to provide counseling and guidance regarding their new diagnosis. Time was spent reviewing all outside records and information pertaining to their work-up and formulating a treatment plan in line with standardized guidelines. Additional time was spent communicating with referring physicians and facilities to facilitate the efficient exchange of previous healthcare records and radiographic imaging pertinent to the diagnosis and disease management.       Orders Placed This Encounter   Procedures    CT Abdomen Pelvis W Wo Contrast     Standing Status:   Future     Expected Date:   5/27/2025     Expiration Date:   5/27/2026     Is the patient allergic to iodine contrast?:   No     Does this patient have impaired renal function?:   No     Diabetes?:   No     May the Radiologist modify the order per protocol to meet the clinical needs of the patient?:   Yes      Oral/Rectal Contrast instructions::   Routine Oral Contrast     Special CT ABD Protocol Request?:   Pancreas Protocol    Creatinine, serum     Standing Status:   Future     Number of Occurrences:   1     Expected Date:   5/27/2025     Expiration Date:   8/25/2026     Send normal result to authorizing provider's In Basket if patient is active on MyChart::   Yes                 [1]   Social History  Socioeconomic History    Marital status:      Spouse name: Shen Tohmpson    Number of children: 1   Tobacco Use    Smoking status: Never    Smokeless tobacco: Never   Substance and Sexual Activity    Alcohol use: Not Currently     Comment: occasionally    Drug use: No    Sexual activity: Yes     Partners: Male     Social Drivers of Health     Financial Resource Strain: Low Risk  (8/6/2024)    Overall Financial Resource Strain (CARDIA)     Difficulty of Paying Living Expenses: Not hard at all   Food Insecurity: No Food Insecurity (8/6/2024)    Hunger Vital Sign     Worried About Running Out of Food in the Last Year: Never true     Ran Out of Food in the Last Year: Never true   Transportation Needs: No Transportation Needs (5/30/2023)    PRAPARE - Transportation     Lack of Transportation (Medical): No     Lack of Transportation (Non-Medical): No   Physical Activity: Sufficiently Active (8/6/2024)    Exercise Vital Sign     Days of Exercise per Week: 5 days     Minutes of Exercise per Session: 30 min   Stress: Stress Concern Present (8/6/2024)    [employer] - Occupational Stress Questionnaire     Feeling of Stress : To some extent   Housing Stability: Unknown (5/30/2023)    Housing Stability Vital Sign     Unable to Pay for Housing in the Last Year: No     Unstable Housing in the Last Year: No

## 2025-05-28 ENCOUNTER — HOSPITAL ENCOUNTER (OUTPATIENT)
Dept: RADIOLOGY | Facility: HOSPITAL | Age: 65
Discharge: HOME | End: 2025-05-28
Attending: SURGERY
Payer: COMMERCIAL

## 2025-05-28 ENCOUNTER — HOSPITAL ENCOUNTER (OUTPATIENT)
Dept: RADIOLOGY | Facility: HOSPITAL | Age: 65
Discharge: HOME | End: 2025-05-28
Attending: NURSE PRACTITIONER
Payer: COMMERCIAL

## 2025-05-28 ENCOUNTER — ANESTHESIA EVENT (OUTPATIENT)
Dept: SURGERY | Facility: HOSPITAL | Age: 65
End: 2025-05-28
Payer: COMMERCIAL

## 2025-05-28 ENCOUNTER — OFFICE VISIT (OUTPATIENT)
Dept: INTERNAL MEDICINE | Facility: CLINIC | Age: 65
End: 2025-05-28
Payer: COMMERCIAL

## 2025-05-28 ENCOUNTER — HOSPITAL ENCOUNTER (OUTPATIENT)
Dept: CARDIOLOGY | Facility: HOSPITAL | Age: 65
Discharge: HOME | End: 2025-05-28
Attending: SURGERY
Payer: COMMERCIAL

## 2025-05-28 VITALS
DIASTOLIC BLOOD PRESSURE: 92 MMHG | HEART RATE: 97 BPM | TEMPERATURE: 99 F | RESPIRATION RATE: 18 BRPM | SYSTOLIC BLOOD PRESSURE: 133 MMHG | OXYGEN SATURATION: 97 %

## 2025-05-28 DIAGNOSIS — D49.0 INTRADUCTAL PAPILLARY MUCINOUS NEOPLASM OF PANCREAS: ICD-10-CM

## 2025-05-28 DIAGNOSIS — Z01.818 PREOPERATIVE EXAMINATION: ICD-10-CM

## 2025-05-28 DIAGNOSIS — R07.89 ATYPICAL CHEST PAIN: ICD-10-CM

## 2025-05-28 DIAGNOSIS — D49.0 IPMN (INTRADUCTAL PAPILLARY MUCINOUS NEOPLASM): ICD-10-CM

## 2025-05-28 DIAGNOSIS — Z01.818 PREOPERATIVE EXAMINATION: Primary | ICD-10-CM

## 2025-05-28 DIAGNOSIS — I10 PRIMARY HYPERTENSION: ICD-10-CM

## 2025-05-28 LAB
OHS QRS DURATION: 76 MS
OHS QTC CALCULATION: 420 MS

## 2025-05-28 PROCEDURE — 74178 CT ABD&PLV WO CNTR FLWD CNTR: CPT | Mod: TC,PN

## 2025-05-28 PROCEDURE — 74178 CT ABD&PLV WO CNTR FLWD CNTR: CPT | Mod: 26,,, | Performed by: RADIOLOGY

## 2025-05-28 PROCEDURE — 93005 ELECTROCARDIOGRAM TRACING: CPT

## 2025-05-28 PROCEDURE — 25500020 PHARM REV CODE 255: Mod: PN | Performed by: SURGERY

## 2025-05-28 PROCEDURE — 71046 X-RAY EXAM CHEST 2 VIEWS: CPT | Mod: TC

## 2025-05-28 PROCEDURE — 99999 PR PBB SHADOW E&M-EST. PATIENT-LVL III: CPT | Mod: PBBFAC,,,

## 2025-05-28 PROCEDURE — 71046 X-RAY EXAM CHEST 2 VIEWS: CPT | Mod: 26,,, | Performed by: STUDENT IN AN ORGANIZED HEALTH CARE EDUCATION/TRAINING PROGRAM

## 2025-05-28 PROCEDURE — 93010 ELECTROCARDIOGRAM REPORT: CPT | Mod: ,,, | Performed by: INTERNAL MEDICINE

## 2025-05-28 RX ADMIN — IOHEXOL 100 ML: 350 INJECTION, SOLUTION INTRAVENOUS at 10:05

## 2025-05-28 NOTE — DISCHARGE INSTRUCTIONS
To confirm, Surgery is scheduled on tomorrow 5/29/25.       *Please report to the [facility] (1st Floor) located off of [facility] (2nd Entrance/Building on the left, in front of the flag pole). Address: [address] [place] [address] [place]. [id number]       INSTRUCTIONS IMPORTANT!!!  DO NOT Eat, Drink, or Smoke after 12 midnight unless instructed otherwise by your Surgeon. OK to brush teeth, no gum, candy or mints!    MORNING OF SURGERY, drink small sip of water with the following medications instructed by Pre-Admit Provider:  N/A    Diabetic Patients: If you take diabetic or weight loss medication, Do NOT take morning of surgery unless instructed by Doctor. Metformin to be stopped 24 hrs prior to surgery.     DO NOT take long-acting insulin the evening before surgery. Blood sugars will be checked in pre-op by Nurse.    If you take Ozempic/ Mounjaro / Wegovy / Trulicity / Semaglutide, any weight loss injections OR PHENTERMINE --->>> PLEASE LET US KNOW IMMEDIATELY, as these medications need to be stopped 7 days prior to surgery!    *Patients should HOLD all vitamins, herbal supplements, weight loss medication, aspirin products & NSAIDS 7 days prior to surgery, as these can thin the blood. Ok to take Tylenol.    ____  Avoid Alcoholic beverages 3 days prior to surgery, as it can thin the blood.  ____  NO Acrylic/fake nails or nail polish worn day of surgery (specifically hand/arm & foot surgeries).  ____  NO powder, lotions, deodorants, oils or cream on body.  ____  Remove all jewelry, piercings, & foreign objects prior to arrival and leave at home.  ____  Remove Dentures, Hearing Aids & Contact Lens prior to surgery.  ____  Bring photo ID and insurance information to hospital (Leave Valuables at Home).  ____  If going home the same day, arrange for a ride home. You will not be able to drive for 24 hrs if Anesthesia was used.   ____  Females (ages 11-60): may need to give a urine sample the morning of  surgery; please see Pre op Nurse prior to using the restroom.  ____  Males: Stop ED medications (Viagra, Cialis) 24 hrs prior to surgery.  ____  Wear clean, loose fitting clothing to allow for dressings/ bandages.    Bathing Instructions:    -Shower with anti-bacterial Soap (ex: Hibiclens or Dial) the night before surgery and the morning of.   -Do not use Hibiclens on your face or genitals.   -Apply clean clothes after shower.  -Do not shave your face morning of surgery   -Do not shave your body 3 days prior to surgery unless instructed otherwise by your Surgeon.    [facility]Ride Policy:  Only 2 adults allowed in pre op/recovery area during your procedure. You MUST HAVE A RIDE HOME from a responsible adult that you know and trust. Medical Transport, Uber or Lyft can ONLY be used if patient has a responsible adult to accompany them during ride home.       *Signs and symptoms of Infection Before or After Surgery:               !!!If you experience any fever, chills, nausea/ vomiting, foul odor/ excessive drainage from surgical site, flu-like symptoms, new wounds or cuts, PLEASE CALL THE SURGEON OFFICE at [phone] or SEND MESSAGE THROUGH [employer] PORTAL!!!     *If you are running late day of surgery, please call the Surgery Dept @ [phone].    *Billing question, please call  [phone]     Thank you,  -[facility] Pre Admit Dept.  [phone]   or [phone]  M-F 7:30 am-4:00 pm (Closed Major Holidays)    Additional Tests Scheduled Today:  CBC, CMP, CXR (1st Floor) and EKG  (4th Floor) Check in at the

## 2025-05-28 NOTE — PRE-PROCEDURE INSTRUCTIONS
Pre op instructions reviewed with patient during Clinic Visit with Provider.    To confirm, Surgery is scheduled on tomorrow 5/29/25.       *Please report to the [facility] (1st Floor) located off of [facility] (2nd Entrance/Building on the left, in front of the flag pole). Address: [address] [place] [address] [place]. [address]       INSTRUCTIONS IMPORTANT!!!  DO NOT Eat, Drink, or Smoke after 12 midnight unless instructed otherwise by your Surgeon. OK to brush teeth, no gum, candy or mints!    MORNING OF SURGERY, drink small sip of water with the following medications instructed by Pre-Admit Provider:  N/A    Diabetic Patients: If you take diabetic or weight loss medication, Do NOT take morning of surgery unless instructed by Doctor. Metformin to be stopped 24 hrs prior to surgery.     DO NOT take long-acting insulin the evening before surgery. Blood sugars will be checked in pre-op by Nurse.    If you take Ozempic/ Mounjaro / Wegovy / Trulicity / Semaglutide, any weight loss injections OR PHENTERMINE --->>> PLEASE LET US KNOW IMMEDIATELY, as these medications need to be stopped 7 days prior to surgery!    *Patients should HOLD all vitamins, herbal supplements, weight loss medication, aspirin products & NSAIDS 7 days prior to surgery, as these can thin the blood. Ok to take Tylenol.    ____  Avoid Alcoholic beverages 3 days prior to surgery, as it can thin the blood.  ____  NO Acrylic/fake nails or nail polish worn day of surgery (specifically hand/arm & foot surgeries).  ____  NO powder, lotions, deodorants, oils or cream on body.  ____  Remove all jewelry, piercings, & foreign objects prior to arrival and leave at home.  ____  Remove Dentures, Hearing Aids & Contact Lens prior to surgery.  ____  Bring photo ID and insurance information to hospital (Leave Valuables at Home).  ____  If going home the same day, arrange for a ride home. You will not be able to drive for 24 hrs if Anesthesia was used.    ____  Females (ages 11-60): may need to give a urine sample the morning of surgery; please see Pre op Nurse prior to using the restroom.  ____  Males: Stop ED medications (Viagra, Cialis) 24 hrs prior to surgery.  ____  Wear clean, loose fitting clothing to allow for dressings/ bandages.    Bathing Instructions:    -Shower with anti-bacterial Soap (ex: Hibiclens or Dial) the night before surgery and the morning of.   -Do not use Hibiclens on your face or genitals.   -Apply clean clothes after shower.  -Do not shave your face morning of surgery   -Do not shave your body 3 days prior to surgery unless instructed otherwise by your Surgeon.    [facility] Policy:  Only 2 adults allowed in pre op/recovery area during your procedure. You MUST HAVE A RIDE HOME from a responsible adult that you know and trust. Medical Transport, Uber or Lyft can ONLY be used if patient has a responsible adult to accompany them during ride home.       *Signs and symptoms of Infection Before or After Surgery:               !!!If you experience any fever, chills, nausea/ vomiting, foul odor/ excessive drainage from surgical site, flu-like symptoms, new wounds or cuts, PLEASE CALL THE SURGEON OFFICE at [phone] or SEND MESSAGE THROUGH [email or web address] PORTAL!!!     *If you are running late day of surgery, please call the Surgery Dept @ [phone].    *Billing question, please call  [phone]     Thank you,  -[facility] Pre [facility].  [phone]   or [phone]  M-F 7:30 am-4:00 pm (Closed Major Holidays)    Additional Tests Scheduled Today:  CBC, CMP, CXR (1st Floor) and EKG  (4th Floor) Check in at the

## 2025-05-28 NOTE — ANESTHESIA PREPROCEDURE EVALUATION
05/28/2025  Chel Thompson is a 64 y.o., female    Past Medical History:   Diagnosis Date    Herpes genitalis in women 05/27/2025    Hypertension     Hypertensive heart disease without heart failure 02/04/2021    Intraductal papillary mucinous neoplasm of pancreas 03/04/2025    Pancreatitis     4 times (2009,2013)    UTI (urinary tract infection)      Past Surgical History:   Procedure Laterality Date    COLONOSCOPY N/A 8/22/2024    Procedure: COLONOSCOPY;  Surgeon: Kya Garcia MD;  Location: [facility] ENDO;  Service: Endoscopy;  Laterality: N/A;    DILATION AND CURETTAGE OF UTERUS      ENDOSCOPIC ULTRASOUND OF UPPER GASTROINTESTINAL TRACT N/A 2/26/2025    Procedure: ULTRASOUND, UPPER GI TRACT, ENDOSCOPIC;  Surgeon: Abdi Mtz MD;  Location: [facility] ENDO;  Service: Endoscopy;  Laterality: N/A;  2/21 portal-EUS with anyone in next 2-3 weeks, either site should be ok. [email or web address]    MULTIPLE TOOTH EXTRACTIONS      vaginal delivery      varicose veins         Chemistry        Component Value Date/Time     02/11/2025 0846    K 4.5 02/11/2025 0846     02/11/2025 0846    CO2 26 02/11/2025 0846    BUN 33 (H) 02/11/2025 0846    CREATININE 0.8 05/27/2025 1215    GLU 90 02/11/2025 0846        Component Value Date/Time    CALCIUM 9.9 02/11/2025 0846    ALKPHOS 60 02/11/2025 0846    AST 15 02/11/2025 0846    ALT 26 02/11/2025 0846    BILITOT 0.5 02/11/2025 0846    ESTGFRAFRICA >60 02/07/2022 0726    EGFRNONAA >60 02/07/2022 0726        Lab Results   Component Value Date    WBC 5.30 08/06/2024    HGB 14.4 08/06/2024    HCT 45.4 08/06/2024    MCV 94 08/06/2024     08/06/2024       Pre-op Assessment    I have reviewed the Patient Summary Reports.    I have reviewed the NPO Status.   I have reviewed the Medications.     Review of Systems  Anesthesia Hx:  No problems with previous Anesthesia    History of prior surgery of interest to airway management or planning:  Previous anesthesia: MAC          Denies Personal Hx of Anesthesia complications.                    Social:  Non-Smoker       Hematology/Oncology:  Hematology Normal                     Current/Recent Cancer.            Oncology Comments: IPMN (intraductal papillary mucinous neoplasm)      Cardiovascular:     Hypertension              ECG has been reviewed. Normal sinus rhythm with sinus arrhythmia   Possible Left atrial enlargement   Borderline Abnormal ECG   When compared with ECG of 19-JAN-2021 11:02,   T wave inversion now evident in Anterior leads   Confirmed by Collins RUSSELL MD ([id number]) on 12/11/2022 7:37:41 AM                              Pulmonary:  Pulmonary Normal                       Renal/:  Renal/ Normal                 Musculoskeletal:  Musculoskeletal Normal                Neurological:        Peripheral Neuropathy                          Endocrine:     BMI 31      Obesity / BMI > 30      Physical Exam  General: Well nourished    Airway:  Mallampati: II   Mouth Opening: Normal  TM Distance: Normal  Neck ROM: Normal ROM    Dental:  Intact        Anesthesia Plan  Type of Anesthesia, risks & benefits discussed:    Anesthesia Type: Gen ETT, Regional  Intra-op Monitoring Plan: Standard ASA Monitors, Art Line and Central Line  Post Op Pain Control Plan: multimodal analgesia and IV/PO Opioids PRN  Induction:  IV  Airway Plan: Direct, Post-Induction  Informed Consent: Informed consent signed with the Patient and all parties understand the risks and agree with anesthesia plan.  All questions answered.   ASA Score: 3  Day of Surgery Review of History & Physical: H&P Update referred to the surgeon/provider.    Ready For Surgery From Anesthesia Perspective.     .

## 2025-05-28 NOTE — PROGRESS NOTES
Preoperative History and Physical                                                             Hospital Medicine      Chief Complaint: Preoperative evaluation    History of Present Illness:      Chel Thompson is a 64 y.o. female who has a past medical history of Herpes genitalis in women, Hypertension, Hypertensive heart disease without heart failure, Intraductal papillary mucinous neoplasm of pancreas, Pancreatitis, and UTI (urinary tract infection) who presents to the office today for a preoperative consultation at the request of Dr. Lieberman who plans on performing WHIPPLE PROCEDURE on May 29, 2025    Functional Status:      The patient is able to climb a flight of stairs. The patient is able to ambulate without difficulty. The patient's functional status is not affected by their joint pain. The patient's functional status is not affected by shortness of breath, chest pain, dyspnea on exertion and fatigue.      Past Medical History:      Past Medical History:   Diagnosis Date    Herpes genitalis in women 05/27/2025    Hypertension     Hypertensive heart disease without heart failure 02/04/2021    Intraductal papillary mucinous neoplasm of pancreas 03/04/2025    Pancreatitis     4 times (2009,2013)    UTI (urinary tract infection)         Past Surgical History:      Past Surgical History:   Procedure Laterality Date    COLONOSCOPY N/A 8/22/2024    Procedure: COLONOSCOPY;  Surgeon: Kya Garcia MD;  Location: [facility];  Service: Endoscopy;  Laterality: N/A;    DILATION AND CURETTAGE OF UTERUS      ENDOSCOPIC ULTRASOUND OF UPPER GASTROINTESTINAL TRACT N/A 2/26/2025    Procedure: ULTRASOUND, UPPER GI TRACT, ENDOSCOPIC;  Surgeon: Abdi Mtz MD;  Location: [facility];  Service: Endoscopy;  Laterality: N/A;  2/21 portal-EUS with anyone in next 2-3 weeks, either site should be ok. [email or web address]    MULTIPLE TOOTH EXTRACTIONS      vaginal delivery      varicose veins          Social History:      Social  History     Socioeconomic History    Marital status:      Spouse name: Shen Thompson    Number of children: 1   Tobacco Use    Smoking status: Never    Smokeless tobacco: Never   Substance and Sexual Activity    Alcohol use: Not Currently     Comment: occasionally    Drug use: No    Sexual activity: Yes     Partners: Male     Social Drivers of Health     Financial Resource Strain: Low Risk  (8/6/2024)    Overall Financial Resource Strain (CARDIA)     Difficulty of Paying Living Expenses: Not hard at all   Food Insecurity: No Food Insecurity (8/6/2024)    Hunger Vital Sign     Worried About Running Out of Food in the Last Year: Never true     Ran Out of Food in the Last Year: Never true   Transportation Needs: No Transportation Needs (5/30/2023)    PRAPARE - Transportation     Lack of Transportation (Medical): No     Lack of Transportation (Non-Medical): No   Physical Activity: Sufficiently Active (8/6/2024)    Exercise Vital Sign     Days of Exercise per Week: 5 days     Minutes of Exercise per Session: 30 min   Stress: Stress Concern Present (8/6/2024)    [place] [employer] - Occupational Stress Questionnaire     Feeling of Stress : To some extent   Housing Stability: Unknown (5/30/2023)    Housing Stability Vital Sign     Unable to Pay for Housing in the Last Year: No     Unstable Housing in the Last Year: No        Family History:      Family History   Problem Relation Name Age of Onset    Asthma Mother      Hypertension Mother      COPD Brother 3     Cancer Maternal Grandmother      Cancer Maternal Grandfather         Allergies:      Review of patient's allergies indicates:   Allergen Reactions    Dilaudid [hydromorphone] Itching     From IV    Nitrofurantoin Other (See Comments)     Cramping of legs       Medications:      No current facility-administered medications for this visit.     No current outpatient medications on file.     Facility-Administered Medications Ordered in Other  Visits   Medication    0.9% NaCl infusion    dexAMETHasone injection    fentaNYL 50 mcg/mL injection    heparin (porcine) injection 5,000 Units    lactated ringers infusion    LIDOcaine (PF) 10 mg/ml (1%) injection    midazolam (VERSED) 1 mg/mL injection    rocuronium injection       Vitals:      BP:  133/92  HR:  97  RR:  18  Temp: 98.8  SpO2:  97% room air    Review of Systems:        Constitutional: Negative for fever, chills, weight loss, malaise/fatigue and diaphoresis.   HENT: Negative for hearing loss, ear pain, nosebleeds, congestion, sore throat, neck pain, tinnitus and ear discharge.    Eyes: Negative for blurred vision, double vision, photophobia, pain, discharge and redness.   Respiratory: Negative for cough, hemoptysis, sputum production, shortness of breath, wheezing and stridor.    Cardiovascular: Negative for chest pain, palpitations, orthopnea, claudication, leg swelling and PND.   Gastrointestinal: Negative for heartburn, nausea, vomiting, abdominal pain, diarrhea, constipation, blood in stool and melena.   Genitourinary: Negative for dysuria, urgency, frequency, hematuria and flank pain.   Musculoskeletal: Negative for myalgias, back pain, joint pain and falls.   Skin: Negative for itching and rash.   Neurological: Negative for dizziness, tingling, tremors, sensory change, speech change, focal weakness, seizures, loss of consciousness, weakness and headaches.   Endo/Heme/Allergies: Negative for environmental allergies and polydipsia. Does not bruise/bleed easily.   Psychiatric/Behavioral: Negative for depression, suicidal ideas, hallucinations, memory loss and substance abuse. The patient is not nervous/anxious and does not have insomnia.    All 14 systems reviewed and negative except as noted above.    Physical Exam:      Constitutional: Appears well-developed, well-nourished and in no acute distress.  Pt is oriented to person, place, and time.   Head: Normocephalic and atraumatic. Mucous  membranes moist.  Neck: Neck supple no mass.   Cardiovascular: Normal rate and regular rhythm.  S1 S2 appreciated by ascultation.  Pulmonary/Chest: Effort normal and clear to auscultation bilaterally. No respiratory distress.   Abdomen: Soft. Non-tender and non-distended. Bowel sounds are normal.   Neurological: Pt is alert and oriented to person, place, and time. Moves all extremities.  Skin: Warm and dry. No lesions.  Extremities: No clubbing cyanosis or edema.    Laboratory data:      Reviewed and noted in plan where applicable. Please see chart for full laboratory data.    Lab Results   Component Value Date    WBC 7.61 05/28/2025    HGB 14.8 05/28/2025    HCT 45.3 05/28/2025    MCV 90 05/28/2025     05/28/2025       Predictors of intubation difficulty:       Morbid obesity? no   Anatomically abnormal facies? no   Prominent incisors? no   Receding mandible? no   Short, thick neck? no   Neck range of motion: normal   Dentition: wisdom teeth removed    Cardiographics:      ECG (dated 5/28/25):   Vent. Rate :  83 BPM     Atrial Rate :  83 BPM      P-R Int : 140 ms          QRS Dur :  76 ms       QT Int : 358 ms       P-R-T Axes :  26   6  48 degrees     QTcB Int : 420 ms     Normal sinus rhythm   Cannot rule out Anterior infarct ,age undetermined   Abnormal ECG   No previous ECGs available   Confirmed by Paramjit Green ([id number]) on 5/28/2025 9:28:06 PM     Echocardiogram: not done    Imaging:      Chest x-ray (dated 5/28/25):  EXAMINATION:  XR CHEST PA AND LATERAL     CLINICAL HISTORY:  Encounter for other preprocedural examination     TECHNIQUE:  PA and lateral views of the chest were performed.     COMPARISON:  12/11/2022     FINDINGS:  Cardiac silhouette is unchanged.     The lungs are clear and free of infiltrate.  No pleural effusion or pneumothorax.     No evidence of acute osseous abnormality.     Impression:     As above.       Assessment:      64 y.o. female with planned surgery as above.    Known risk  "factors for perioperative complications: HTN    Difficulty with intubation is not anticipated.      Plan:      Preoperative examination  Assessment & Plan:  Known risk factors for perioperative complications: HTN    Difficulty with intubation is not anticipated.    Cardiac Risk Estimation:     Revised Cardiac Risk Index for Pre-Operative Risk from [employer] [place]  on 5/29/2025  ** All calculations should be rechecked by clinician prior to use **    RESULT SUMMARY:  1 points  RCRI Score    6.0 %  Risk of major cardiac event      INPUTS:  High-risk surgery --> 0 = No  History of ischemic heart disease --> 1 = Yes  History of congestive heart failure --> 0 = No  History of cerebrovascular disease --> 0 = No  Pre-operative treatment with insulin --> 0 = No  Pre-operative creatinine >2 mg/dL / 176.8 µmol/L --> 0 = No      1.) Preoperative workup as follows: chest x-ray, ECG, hemoglobin, hematocrit, electrolytes, creatinine, glucose, liver function studies.  2.) Change in medication regimen before surgery: no medications the morning of surgery.  3.) Prophylaxis for cardiac events with perioperative beta-blockers: not indicated.  4.) Invasive hemodynamic monitoring perioperatively: at the discretion of anesthesiologist.  5.) Deep vein thrombosis prophylaxis postoperatively: regimen to be chosen by surgical team.  6.) Surveillance for postoperative MI with ECG immediately postoperatively and on postoperati ve days 1 and 2 AND troponin levels 24 hours postoperatively and on day 4 or hospital discharge (whichever comes first): not indicated.  7.) Current medications which may produce withdrawal symptoms if withheld perioperatively: N/A  8.) Other measures: Postoperative hypertension management with IV hydralazine until able to take oral medications.  Postoperative incentive spirometry to prevent pneumonia.  Will need cardiac clearance from Dr. Read at [facility] -- hx of HTN, "leaky valves", hx of abnormal stress test in 2023 w/ [facility]. " Had stress test in 2024 w/ [facility], per patient was normal.   No meds the morning of surgery -- takes flexeril and trazodone nightly, hold valsartan-hctz morning of surgery    Orders:  -     CBC Auto Differential; Future; Expected date: 05/28/2025  -     Comprehensive Metabolic Panel; Future; Expected date: 05/28/2025  -     X-Ray Chest PA And Lateral; Future; Expected date: 05/28/2025  -     IN OFFICE EKG 12-LEAD (to Muse)  -     Type & Screen; Future; Expected date: 05/28/2025  -     POCT PT/INR    Intraductal papillary mucinous neoplasm of pancreas  -     Ambulatory referral/consult to Pre-Admit    Atypical chest pain  Assessment & Plan:  -Positive stress test in 2023 w/ Dr. Crockett of [facility] but repeat stress test per patient normal in 2024 w/ Dr. Read at [facility]. She currently denies chest pain and/or equivalent. BP stable in office.  -Obtain cardiac clearance from Dr. Read for procedure      Primary hypertension  Assessment & Plan:  -BP controlled in office  -She currently takes Valsartan-HCTZ, which she will hold the morning of surgery. May resume postop if BP permits

## 2025-05-29 ENCOUNTER — HOSPITAL ENCOUNTER (INPATIENT)
Facility: HOSPITAL | Age: 65
LOS: 7 days | Discharge: HOME HEALTH | DRG: 407 | End: 2025-06-05
Attending: SURGERY | Admitting: SURGERY
Payer: COMMERCIAL

## 2025-05-29 ENCOUNTER — ANESTHESIA (OUTPATIENT)
Dept: SURGERY | Facility: HOSPITAL | Age: 65
End: 2025-05-29
Payer: COMMERCIAL

## 2025-05-29 DIAGNOSIS — D49.0 INTRADUCTAL PAPILLARY MUCINOUS NEOPLASM OF PANCREAS: ICD-10-CM

## 2025-05-29 DIAGNOSIS — D49.0 IPMN (INTRADUCTAL PAPILLARY MUCINOUS NEOPLASM): Primary | ICD-10-CM

## 2025-05-29 PROBLEM — Z01.818 PREOPERATIVE EXAMINATION: Status: ACTIVE | Noted: 2025-05-29

## 2025-05-29 PROBLEM — Z87.19 H/O ACUTE PANCREATITIS: Status: ACTIVE | Noted: 2025-05-29

## 2025-05-29 LAB
ALBUMIN SERPL BCP-MCNC: 3.7 G/DL (ref 3.5–5.2)
ALP SERPL-CCNC: 53 UNIT/L (ref 40–150)
ALT SERPL W/O P-5'-P-CCNC: 265 UNIT/L (ref 10–44)
ANION GAP (OHS): 14 MMOL/L (ref 8–16)
AST SERPL-CCNC: 186 UNIT/L (ref 11–45)
BILIRUB SERPL-MCNC: 1.5 MG/DL (ref 0.1–1)
BUN SERPL-MCNC: 29 MG/DL (ref 8–23)
CALCIUM SERPL-MCNC: 8.4 MG/DL (ref 8.7–10.5)
CHLORIDE SERPL-SCNC: 106 MMOL/L (ref 95–110)
CO2 SERPL-SCNC: 19 MMOL/L (ref 23–29)
CREAT SERPL-MCNC: 0.8 MG/DL (ref 0.5–1.4)
ERYTHROCYTE [DISTWIDTH] IN BLOOD BY AUTOMATED COUNT: 13 % (ref 11.5–14.5)
GFR SERPLBLD CREATININE-BSD FMLA CKD-EPI: >60 ML/MIN/1.73/M2
GLUCOSE SERPL-MCNC: 142 MG/DL (ref 70–110)
HCT VFR BLD AUTO: 38.8 % (ref 37–48.5)
HGB BLD-MCNC: 13.4 GM/DL (ref 12–16)
MCH RBC QN AUTO: 30.2 PG (ref 27–31)
MCHC RBC AUTO-ENTMCNC: 34.5 G/DL (ref 32–36)
MCV RBC AUTO: 87 FL (ref 82–98)
PLATELET # BLD AUTO: 201 K/UL (ref 150–450)
PMV BLD AUTO: 8.9 FL (ref 9.2–12.9)
POTASSIUM SERPL-SCNC: 3.9 MMOL/L (ref 3.5–5.1)
PROT SERPL-MCNC: 6.2 GM/DL (ref 6–8.4)
RBC # BLD AUTO: 4.44 M/UL (ref 4–5.4)
SODIUM SERPL-SCNC: 139 MMOL/L (ref 136–145)
WBC # BLD AUTO: 12.51 K/UL (ref 3.9–12.7)

## 2025-05-29 PROCEDURE — 37000008 HC ANESTHESIA 1ST 15 MINUTES: Performed by: SURGERY

## 2025-05-29 PROCEDURE — 20000000 HC ICU ROOM

## 2025-05-29 PROCEDURE — 25000003 PHARM REV CODE 250: Performed by: SURGERY

## 2025-05-29 PROCEDURE — 63600175 PHARM REV CODE 636 W HCPCS: Performed by: ANESTHESIOLOGY

## 2025-05-29 PROCEDURE — 0FT40ZZ RESECTION OF GALLBLADDER, OPEN APPROACH: ICD-10-PCS | Performed by: SURGERY

## 2025-05-29 PROCEDURE — 27201423 OPTIME MED/SURG SUP & DEVICES STERILE SUPPLY: Performed by: SURGERY

## 2025-05-29 PROCEDURE — 88309 TISSUE EXAM BY PATHOLOGIST: CPT | Mod: TC | Performed by: SURGERY

## 2025-05-29 PROCEDURE — 36000708 HC OR TIME LEV III 1ST 15 MIN: Performed by: SURGERY

## 2025-05-29 PROCEDURE — 0DT90ZZ RESECTION OF DUODENUM, OPEN APPROACH: ICD-10-PCS | Performed by: SURGERY

## 2025-05-29 PROCEDURE — 63600175 PHARM REV CODE 636 W HCPCS: Performed by: SURGERY

## 2025-05-29 PROCEDURE — 71000033 HC RECOVERY, INTIAL HOUR: Performed by: SURGERY

## 2025-05-29 PROCEDURE — C1729 CATH, DRAINAGE: HCPCS | Performed by: SURGERY

## 2025-05-29 PROCEDURE — 85027 COMPLETE CBC AUTOMATED: CPT | Performed by: SURGERY

## 2025-05-29 PROCEDURE — 88331 PATH CONSLTJ SURG 1 BLK 1SPC: CPT | Mod: 26,,, | Performed by: PATHOLOGY

## 2025-05-29 PROCEDURE — 80053 COMPREHEN METABOLIC PANEL: CPT | Performed by: SURGERY

## 2025-05-29 PROCEDURE — 36000709 HC OR TIME LEV III EA ADD 15 MIN: Performed by: SURGERY

## 2025-05-29 PROCEDURE — 63600175 PHARM REV CODE 636 W HCPCS: Performed by: FAMILY MEDICINE

## 2025-05-29 PROCEDURE — 71000039 HC RECOVERY, EACH ADD'L HOUR: Performed by: SURGERY

## 2025-05-29 PROCEDURE — 0FB90ZZ EXCISION OF COMMON BILE DUCT, OPEN APPROACH: ICD-10-PCS | Performed by: SURGERY

## 2025-05-29 PROCEDURE — 88309 TISSUE EXAM BY PATHOLOGIST: CPT | Mod: 26,,, | Performed by: PATHOLOGY

## 2025-05-29 PROCEDURE — 88305 TISSUE EXAM BY PATHOLOGIST: CPT | Mod: 26,,, | Performed by: PATHOLOGY

## 2025-05-29 PROCEDURE — 76942 ECHO GUIDE FOR BIOPSY: CPT | Performed by: ANESTHESIOLOGY

## 2025-05-29 PROCEDURE — 25000003 PHARM REV CODE 250: Performed by: ANESTHESIOLOGY

## 2025-05-29 PROCEDURE — 25000003 PHARM REV CODE 250: Performed by: FAMILY MEDICINE

## 2025-05-29 PROCEDURE — 0FBG0ZZ EXCISION OF PANCREAS, OPEN APPROACH: ICD-10-PCS | Performed by: SURGERY

## 2025-05-29 PROCEDURE — 88307 TISSUE EXAM BY PATHOLOGIST: CPT | Mod: 26,,, | Performed by: PATHOLOGY

## 2025-05-29 PROCEDURE — 37000009 HC ANESTHESIA EA ADD 15 MINS: Performed by: SURGERY

## 2025-05-29 PROCEDURE — 07BD0ZZ EXCISION OF AORTIC LYMPHATIC, OPEN APPROACH: ICD-10-PCS | Performed by: SURGERY

## 2025-05-29 PROCEDURE — 88304 TISSUE EXAM BY PATHOLOGIST: CPT | Mod: 26,,, | Performed by: PATHOLOGY

## 2025-05-29 PROCEDURE — 88302 TISSUE EXAM BY PATHOLOGIST: CPT | Mod: 26,,, | Performed by: PATHOLOGY

## 2025-05-29 PROCEDURE — P9045 ALBUMIN (HUMAN), 5%, 250 ML: HCPCS | Mod: JZ,TB | Performed by: FAMILY MEDICINE

## 2025-05-29 PROCEDURE — 0DB60ZZ EXCISION OF STOMACH, OPEN APPROACH: ICD-10-PCS | Performed by: SURGERY

## 2025-05-29 RX ORDER — DEXAMETHASONE SODIUM PHOSPHATE 4 MG/ML
INJECTION, SOLUTION INTRA-ARTICULAR; INTRALESIONAL; INTRAMUSCULAR; INTRAVENOUS; SOFT TISSUE
Status: DISCONTINUED | OUTPATIENT
Start: 2025-05-29 | End: 2025-05-29

## 2025-05-29 RX ORDER — PHENYLEPHRINE HYDROCHLORIDE 10 MG/ML
INJECTION INTRAVENOUS
Status: DISCONTINUED | OUTPATIENT
Start: 2025-05-29 | End: 2025-05-29

## 2025-05-29 RX ORDER — ACETAMINOPHEN 10 MG/ML
1000 INJECTION, SOLUTION INTRAVENOUS EVERY 8 HOURS
Status: COMPLETED | OUTPATIENT
Start: 2025-05-30 | End: 2025-05-30

## 2025-05-29 RX ORDER — NALOXONE HCL 0.4 MG/ML
0.02 VIAL (ML) INJECTION
Status: DISCONTINUED | OUTPATIENT
Start: 2025-05-29 | End: 2025-06-05 | Stop reason: HOSPADM

## 2025-05-29 RX ORDER — CHLORHEXIDINE GLUCONATE ORAL RINSE 1.2 MG/ML
10 SOLUTION DENTAL 2 TIMES DAILY
Status: DISPENSED | OUTPATIENT
Start: 2025-05-29 | End: 2025-06-03

## 2025-05-29 RX ORDER — BUPIVACAINE 13.3 MG/ML
INJECTION, SUSPENSION, LIPOSOMAL INFILTRATION
Status: DISCONTINUED | OUTPATIENT
Start: 2025-05-29 | End: 2025-05-29

## 2025-05-29 RX ORDER — BUPIVACAINE HYDROCHLORIDE 2.5 MG/ML
INJECTION, SOLUTION EPIDURAL; INFILTRATION; INTRACAUDAL; PERINEURAL
Status: COMPLETED | OUTPATIENT
Start: 2025-05-29 | End: 2025-05-29

## 2025-05-29 RX ORDER — FENTANYL CITRATE 50 UG/ML
INJECTION, SOLUTION INTRAMUSCULAR; INTRAVENOUS
Status: DISCONTINUED | OUTPATIENT
Start: 2025-05-29 | End: 2025-05-29

## 2025-05-29 RX ORDER — GLUCAGON 1 MG
1 KIT INJECTION
Status: DISCONTINUED | OUTPATIENT
Start: 2025-05-29 | End: 2025-06-05 | Stop reason: HOSPADM

## 2025-05-29 RX ORDER — MORPHINE SULFATE 4 MG/ML
2 INJECTION, SOLUTION INTRAMUSCULAR; INTRAVENOUS EVERY 5 MIN PRN
Status: DISCONTINUED | OUTPATIENT
Start: 2025-05-29 | End: 2025-05-29 | Stop reason: HOSPADM

## 2025-05-29 RX ORDER — ONDANSETRON HYDROCHLORIDE 2 MG/ML
INJECTION, SOLUTION INTRAVENOUS
Status: DISCONTINUED | OUTPATIENT
Start: 2025-05-29 | End: 2025-05-29

## 2025-05-29 RX ORDER — HEPARIN SODIUM 5000 [USP'U]/ML
5000 INJECTION, SOLUTION INTRAVENOUS; SUBCUTANEOUS EVERY 8 HOURS
Status: DISCONTINUED | OUTPATIENT
Start: 2025-05-29 | End: 2025-05-29 | Stop reason: HOSPADM

## 2025-05-29 RX ORDER — ONDANSETRON HYDROCHLORIDE 2 MG/ML
4 INJECTION, SOLUTION INTRAVENOUS DAILY PRN
Status: DISCONTINUED | OUTPATIENT
Start: 2025-05-29 | End: 2025-05-29 | Stop reason: HOSPADM

## 2025-05-29 RX ORDER — ROCURONIUM BROMIDE 10 MG/ML
INJECTION, SOLUTION INTRAVENOUS
Status: DISCONTINUED | OUTPATIENT
Start: 2025-05-29 | End: 2025-05-29

## 2025-05-29 RX ORDER — SODIUM CHLORIDE 9 MG/ML
INJECTION, SOLUTION INTRAVENOUS CONTINUOUS
Status: DISCONTINUED | OUTPATIENT
Start: 2025-05-29 | End: 2025-05-30

## 2025-05-29 RX ORDER — KETOROLAC TROMETHAMINE 30 MG/ML
15 INJECTION, SOLUTION INTRAMUSCULAR; INTRAVENOUS EVERY 8 HOURS PRN
Status: DISCONTINUED | OUTPATIENT
Start: 2025-05-29 | End: 2025-05-29 | Stop reason: HOSPADM

## 2025-05-29 RX ORDER — INSULIN ASPART 100 [IU]/ML
0-5 INJECTION, SOLUTION INTRAVENOUS; SUBCUTANEOUS EVERY 6 HOURS PRN
Status: DISCONTINUED | OUTPATIENT
Start: 2025-05-29 | End: 2025-06-05 | Stop reason: HOSPADM

## 2025-05-29 RX ORDER — ONDANSETRON HYDROCHLORIDE 2 MG/ML
4 INJECTION, SOLUTION INTRAVENOUS EVERY 6 HOURS PRN
Status: DISCONTINUED | OUTPATIENT
Start: 2025-05-29 | End: 2025-06-05 | Stop reason: HOSPADM

## 2025-05-29 RX ORDER — ACETAMINOPHEN 10 MG/ML
INJECTION, SOLUTION INTRAVENOUS
Status: DISCONTINUED | OUTPATIENT
Start: 2025-05-29 | End: 2025-05-29

## 2025-05-29 RX ORDER — MUPIROCIN 20 MG/G
OINTMENT TOPICAL 2 TIMES DAILY
Status: COMPLETED | OUTPATIENT
Start: 2025-05-29 | End: 2025-06-03

## 2025-05-29 RX ORDER — HYDROMORPHONE HCL IN 0.9% NACL 6 MG/30 ML
PATIENT CONTROLLED ANALGESIA SYRINGE INTRAVENOUS CONTINUOUS
Status: DISCONTINUED | OUTPATIENT
Start: 2025-05-29 | End: 2025-05-29

## 2025-05-29 RX ORDER — FAMOTIDINE 10 MG/ML
20 INJECTION, SOLUTION INTRAVENOUS DAILY
Status: DISCONTINUED | OUTPATIENT
Start: 2025-05-30 | End: 2025-05-30

## 2025-05-29 RX ORDER — MORPHINE SULFATE 1 MG/ML
INJECTION INTRAVENOUS CONTINUOUS
Refills: 0 | Status: DISCONTINUED | OUTPATIENT
Start: 2025-05-29 | End: 2025-06-03

## 2025-05-29 RX ORDER — ALBUMIN HUMAN 50 G/1000ML
SOLUTION INTRAVENOUS
Status: DISCONTINUED | OUTPATIENT
Start: 2025-05-29 | End: 2025-05-29

## 2025-05-29 RX ORDER — LIDOCAINE HYDROCHLORIDE 10 MG/ML
INJECTION, SOLUTION EPIDURAL; INFILTRATION; INTRACAUDAL; PERINEURAL
Status: DISCONTINUED | OUTPATIENT
Start: 2025-05-29 | End: 2025-05-29

## 2025-05-29 RX ORDER — SODIUM CHLORIDE 9 MG/ML
INJECTION, SOLUTION INTRAVENOUS CONTINUOUS
Status: DISCONTINUED | OUTPATIENT
Start: 2025-05-29 | End: 2025-05-29

## 2025-05-29 RX ORDER — MIDAZOLAM HYDROCHLORIDE 1 MG/ML
INJECTION INTRAMUSCULAR; INTRAVENOUS
Status: DISCONTINUED | OUTPATIENT
Start: 2025-05-29 | End: 2025-05-29

## 2025-05-29 RX ORDER — DEXMEDETOMIDINE HYDROCHLORIDE 100 UG/ML
INJECTION, SOLUTION INTRAVENOUS
Status: DISCONTINUED | OUTPATIENT
Start: 2025-05-29 | End: 2025-05-29

## 2025-05-29 RX ORDER — PROPOFOL 10 MG/ML
VIAL (ML) INTRAVENOUS
Status: DISCONTINUED | OUTPATIENT
Start: 2025-05-29 | End: 2025-05-29

## 2025-05-29 RX ADMIN — BUPIVACAINE 20 ML: 13.3 INJECTION, SUSPENSION, LIPOSOMAL INFILTRATION at 07:05

## 2025-05-29 RX ADMIN — PROPOFOL 30 MG: 10 INJECTION, EMULSION INTRAVENOUS at 04:05

## 2025-05-29 RX ADMIN — PIPERACILLIN AND TAZOBACTAM 4.5 G: 4; .5 INJECTION, POWDER, LYOPHILIZED, FOR SOLUTION INTRAVENOUS; PARENTERAL at 03:05

## 2025-05-29 RX ADMIN — FENTANYL CITRATE 25 MCG: 50 INJECTION, SOLUTION INTRAMUSCULAR; INTRAVENOUS at 08:05

## 2025-05-29 RX ADMIN — PHENYLEPHRINE HYDROCHLORIDE 100 MCG: 10 INJECTION INTRAVENOUS at 08:05

## 2025-05-29 RX ADMIN — MIDAZOLAM HYDROCHLORIDE 2 MG: 1 INJECTION, SOLUTION INTRAMUSCULAR; INTRAVENOUS at 06:05

## 2025-05-29 RX ADMIN — GLYCOPYRROLATE 0.2 MG: 0.2 INJECTION, SOLUTION INTRAMUSCULAR; INTRAVITREAL at 06:05

## 2025-05-29 RX ADMIN — SUGAMMADEX 200 MG: 100 INJECTION, SOLUTION INTRAVENOUS at 05:05

## 2025-05-29 RX ADMIN — SODIUM CHLORIDE, SODIUM LACTATE, POTASSIUM CHLORIDE, AND CALCIUM CHLORIDE: .6; .31; .03; .02 INJECTION, SOLUTION INTRAVENOUS at 06:05

## 2025-05-29 RX ADMIN — ROCURONIUM BROMIDE 50 MG: 10 INJECTION, SOLUTION INTRAVENOUS at 07:05

## 2025-05-29 RX ADMIN — ROCURONIUM BROMIDE 10 MG: 10 INJECTION, SOLUTION INTRAVENOUS at 01:05

## 2025-05-29 RX ADMIN — FENTANYL CITRATE 50 MCG: 50 INJECTION, SOLUTION INTRAMUSCULAR; INTRAVENOUS at 03:05

## 2025-05-29 RX ADMIN — ROCURONIUM BROMIDE 40 MG: 10 INJECTION, SOLUTION INTRAVENOUS at 10:05

## 2025-05-29 RX ADMIN — ROCURONIUM BROMIDE 10 MG: 10 INJECTION, SOLUTION INTRAVENOUS at 11:05

## 2025-05-29 RX ADMIN — DEXMEDETOMIDINE 8 MCG: 200 INJECTION, SOLUTION INTRAVENOUS at 03:05

## 2025-05-29 RX ADMIN — PIPERACILLIN AND TAZOBACTAM 4.5 G: 4; .5 INJECTION, POWDER, LYOPHILIZED, FOR SOLUTION INTRAVENOUS; PARENTERAL at 07:05

## 2025-05-29 RX ADMIN — FENTANYL CITRATE 50 MCG: 50 INJECTION, SOLUTION INTRAMUSCULAR; INTRAVENOUS at 10:05

## 2025-05-29 RX ADMIN — FENTANYL CITRATE 25 MCG: 50 INJECTION, SOLUTION INTRAMUSCULAR; INTRAVENOUS at 04:05

## 2025-05-29 RX ADMIN — ROCURONIUM BROMIDE 20 MG: 10 INJECTION, SOLUTION INTRAVENOUS at 09:05

## 2025-05-29 RX ADMIN — LIDOCAINE HYDROCHLORIDE 100 MG: 10 SOLUTION INTRAVENOUS at 07:05

## 2025-05-29 RX ADMIN — FENTANYL CITRATE 25 MCG: 50 INJECTION, SOLUTION INTRAMUSCULAR; INTRAVENOUS at 09:05

## 2025-05-29 RX ADMIN — FENTANYL CITRATE 25 MCG: 50 INJECTION, SOLUTION INTRAMUSCULAR; INTRAVENOUS at 02:05

## 2025-05-29 RX ADMIN — FENTANYL CITRATE 50 MCG: 50 INJECTION, SOLUTION INTRAMUSCULAR; INTRAVENOUS at 07:05

## 2025-05-29 RX ADMIN — ROCURONIUM BROMIDE 20 MG: 10 INJECTION, SOLUTION INTRAVENOUS at 08:05

## 2025-05-29 RX ADMIN — PROPOFOL 200 MG: 10 INJECTION, EMULSION INTRAVENOUS at 07:05

## 2025-05-29 RX ADMIN — PHENYLEPHRINE HYDROCHLORIDE 100 MCG: 10 INJECTION INTRAVENOUS at 01:05

## 2025-05-29 RX ADMIN — HEPARIN SODIUM 5000 UNITS: 5000 INJECTION INTRAVENOUS; SUBCUTANEOUS at 06:05

## 2025-05-29 RX ADMIN — ROCURONIUM BROMIDE 30 MG: 10 INJECTION, SOLUTION INTRAVENOUS at 03:05

## 2025-05-29 RX ADMIN — SODIUM CHLORIDE: 9 INJECTION, SOLUTION INTRAVENOUS at 08:05

## 2025-05-29 RX ADMIN — DEXMEDETOMIDINE 4 MCG: 200 INJECTION, SOLUTION INTRAVENOUS at 04:05

## 2025-05-29 RX ADMIN — MORPHINE SULFATE 2 MG: 4 INJECTION INTRAVENOUS at 06:05

## 2025-05-29 RX ADMIN — PROPOFOL 30 MG: 10 INJECTION, EMULSION INTRAVENOUS at 05:05

## 2025-05-29 RX ADMIN — ACETAMINOPHEN 1000 MG: 10 INJECTION INTRAVENOUS at 11:05

## 2025-05-29 RX ADMIN — ROCURONIUM BROMIDE 20 MG: 10 INJECTION, SOLUTION INTRAVENOUS at 10:05

## 2025-05-29 RX ADMIN — ALBUMIN (HUMAN) 250 ML: 2.5 SOLUTION INTRAVENOUS at 08:05

## 2025-05-29 RX ADMIN — ROCURONIUM BROMIDE 10 MG: 10 INJECTION, SOLUTION INTRAVENOUS at 12:05

## 2025-05-29 RX ADMIN — FENTANYL CITRATE 50 MCG: 50 INJECTION, SOLUTION INTRAMUSCULAR; INTRAVENOUS at 12:05

## 2025-05-29 RX ADMIN — MUPIROCIN: 20 OINTMENT TOPICAL at 09:05

## 2025-05-29 RX ADMIN — ACETAMINOPHEN 1000 MG: 10 INJECTION, SOLUTION INTRAVENOUS at 04:05

## 2025-05-29 RX ADMIN — CHLORHEXIDINE GLUCONATE 0.12% ORAL RINSE 10 ML: 1.2 LIQUID ORAL at 09:05

## 2025-05-29 RX ADMIN — ROCURONIUM BROMIDE 20 MG: 10 INJECTION, SOLUTION INTRAVENOUS at 12:05

## 2025-05-29 RX ADMIN — MORPHINE SULFATE 2 MG: 4 INJECTION INTRAVENOUS at 05:05

## 2025-05-29 RX ADMIN — PHENYLEPHRINE HYDROCHLORIDE 100 MCG: 10 INJECTION INTRAVENOUS at 11:05

## 2025-05-29 RX ADMIN — SODIUM CHLORIDE, SODIUM LACTATE, POTASSIUM CHLORIDE, AND CALCIUM CHLORIDE: .6; .31; .03; .02 INJECTION, SOLUTION INTRAVENOUS at 01:05

## 2025-05-29 RX ADMIN — ONDANSETRON 8 MG: 2 INJECTION INTRAMUSCULAR; INTRAVENOUS at 04:05

## 2025-05-29 RX ADMIN — DEXAMETHASONE SODIUM PHOSPHATE 8 MG: 4 INJECTION, SOLUTION INTRA-ARTICULAR; INTRALESIONAL; INTRAMUSCULAR; INTRAVENOUS; SOFT TISSUE at 07:05

## 2025-05-29 RX ADMIN — ROCURONIUM BROMIDE 20 MG: 10 INJECTION, SOLUTION INTRAVENOUS at 02:05

## 2025-05-29 RX ADMIN — MORPHINE SULFATE 2 MG: 4 INJECTION INTRAVENOUS at 07:05

## 2025-05-29 RX ADMIN — MORPHINE SULFATE: 1 INJECTION INTRAVENOUS at 09:05

## 2025-05-29 RX ADMIN — ALBUMIN (HUMAN) 250 ML: 2.5 SOLUTION INTRAVENOUS at 12:05

## 2025-05-29 RX ADMIN — BUPIVACAINE HYDROCHLORIDE 20 ML: 2.5 INJECTION, SOLUTION EPIDURAL; INFILTRATION; INTRACAUDAL; PERINEURAL at 07:05

## 2025-05-29 NOTE — ANESTHESIA PROCEDURE NOTES
Arterial    Diagnosis: Whipple    Patient location during procedure: done in OR  Timeout: 5/29/2025 7:16 AM  Procedure end time: 5/29/2025 7:20 AM    Staffing  Authorizing Provider: Yogi Brar II, MD  Performing Provider: Yogi Brar II, MD    Staffing  Performed by: Yogi Brar II, MD  Authorized by: Yogi Brar II, MD    Anesthesiologist was present at the time of the procedure.    Preanesthetic Checklist  Completed: patient identified, IV checked, site marked, risks and benefits discussed, surgical consent, monitors and equipment checked, pre-op evaluation, timeout performed and anesthesia consent givenArterial  Skin Prep: chlorhexidine gluconate  Local Infiltration: lidocaine  Orientation: right  Location: radial    Catheter Size: 20 G  Catheter placement by Ultrasound guidance. Heme positive aspiration all ports.   Vessel Caliber: medium, patent, compressibility normal  Needle advanced into vessel with real time Ultrasound guidance.  Guidewire confirmed in vessel.  Sterile sheath used.Insertion Attempts: 1  Assessment  Dressing: secured with tape and tegaderm  Patient: Tolerated well

## 2025-05-29 NOTE — ASSESSMENT & PLAN NOTE
-Positive stress test in 2023 w/ Dr. Crockett of [facility] but repeat stress test per patient normal in 2024 w/ Dr. Read at [facility]. She currently denies chest pain and/or equivalent. BP stable in office.  -Obtain cardiac clearance from Dr. Read for procedure

## 2025-05-29 NOTE — INTERVAL H&P NOTE
Chel Thompson is a 64 y.o. female who presents with main duct IPMN.    The patient has been examined and the H&P has been reviewed:    I concur with the findings and no changes have occurred since H&P was written.    Surgery risks, benefits and alternative options discussed and understood by patient/family.    -- to OR for open whipple  -- Laterality marked?  No- n/a  -- Abx ppx:  Zosyn  -- DVT ppx:  SCDs + Heparin  -- Consent signed and in the chart.  All questions have been answered        Yolie Lieberman MD      Surgical Oncology            There are no hospital problems to display for this patient.

## 2025-05-29 NOTE — HPI
Ms. Thompson is a 64-year-old female history of hypertension who presented for a Whipple procedure for IPMN.  Patient has a history of having multiple episodes of pancreatitis.  Patient had an MRI of her spine for pain but also showed multiocular cystic mass at the head of the pancreas.  Patient then had a MRI MRCP of the abdomen in November.  This showed dilation of the main pancreatic duct.  Patient is now in PACU status post procedure.  ICU consulted for ICU management and monitoring postop.  Patient is currently drowsy and unable to provide a history.  Vital signs are stable.  She does have 2 KEVIN drains.

## 2025-05-29 NOTE — SUBJECTIVE & OBJECTIVE
Past Medical History:   Diagnosis Date    Herpes genitalis in women 05/27/2025    Hypertension     Hypertensive heart disease without heart failure 02/04/2021    Intraductal papillary mucinous neoplasm of pancreas 03/04/2025    Pancreatitis     4 times (2009,2013)    UTI (urinary tract infection)        Past Surgical History:   Procedure Laterality Date    COLONOSCOPY N/A 8/22/2024    Procedure: COLONOSCOPY;  Surgeon: Kya Garcia MD;  Location: [facility] ENDO;  Service: Endoscopy;  Laterality: N/A;    DILATION AND CURETTAGE OF UTERUS      ENDOSCOPIC ULTRASOUND OF UPPER GASTROINTESTINAL TRACT N/A 2/26/2025    Procedure: ULTRASOUND, UPPER GI TRACT, ENDOSCOPIC;  Surgeon: Abdi Mtz MD;  Location: [facility] ENDO;  Service: Endoscopy;  Laterality: N/A;  2/21 portal-EUS with anyone in next 2-3 weeks, either site should be ok. [email or web address]    MULTIPLE TOOTH EXTRACTIONS      vaginal delivery      varicose veins         Review of patient's allergies indicates:   Allergen Reactions    Dilaudid [hydromorphone] Itching     From IV    Nitrofurantoin Other (See Comments)     Cramping of legs       Family History       Problem Relation (Age of Onset)    Asthma Mother    COPD Brother    Cancer Maternal Grandmother, Maternal Grandfather    Hypertension Mother          Tobacco Use    Smoking status: Never    Smokeless tobacco: Never   Substance and Sexual Activity    Alcohol use: Not Currently     Comment: occasionally    Drug use: No    Sexual activity: Yes     Partners: Male         Review of Systems   Reason unable to perform ROS: unable to give.     Objective:     Vital Signs (Most Recent):  Temp: 97.9 °F (36.6 °C) (05/29/25 0544)  Pulse: 83 (05/29/25 0544)  Resp: 18 (05/29/25 0544)  BP: 123/87 (05/29/25 0544)  SpO2: 100 % (05/29/25 0544) Vital Signs (24h Range):  Temp:  [97.9 °F (36.6 °C)] 97.9 °F (36.6 °C)  Pulse:  [83] 83  Resp:  [18] 18  SpO2:  [100 %] 100 %  BP: (123)/(87) 123/87     Weight: 92.3 kg (203 lb 7.8 oz)  Body mass  index is 30.94 kg/m².      Intake/Output Summary (Last 24 hours) at 5/29/2025 1740  Last data filed at 5/29/2025 1708  Gross per 24 hour   Intake 2750 ml   Output 480 ml   Net 2270 ml        Physical Exam  Vitals and nursing note reviewed.   Constitutional:       Comments: Currently drowsy   HENT:      Head: Normocephalic and atraumatic.   Eyes:      General:         Right eye: No discharge.         Left eye: No discharge.   Cardiovascular:      Rate and Rhythm: Normal rate and regular rhythm.   Pulmonary:      Effort: No respiratory distress.      Breath sounds: No wheezing.   Abdominal:      Comments: Incision / 2 ana drains    Musculoskeletal:         General: No swelling or tenderness.      Cervical back: Neck supple.   Neurological:      Comments: drowsy          Vents:       Lines/Drains/Airways       Central Venous Catheter Line  Duration             Percutaneous Central Line - Triple Lumen  05/29/25 0730 Internal Jugular Right <1 day              Drain  Duration                  Closed/Suction Drain 05/29/25 Tube - 1 Right RUQ Bulb 19 Fr. <1 day         Closed/Suction Drain 05/29/25 Tube - 2 Lateral;Right RUQ Bulb 19 Fr. <1 day         NG/OG Tube 05/29/25 1715 16 Fr. Right nostril <1 day         Urethral Catheter 05/29/25 0730 Silicone;Non-latex;Straight-tip 16 Fr. <1 day              Arterial Line  Duration             Arterial Line 05/29/25 0716 Right Radial <1 day              Peripheral Intravenous Line  Duration                  Peripheral IV - Single Lumen 05/29/25 0554 20 G 1 in No Anterior;Left Forearm <1 day                    Significant Labs:    CBC/Anemia Profile:  Recent Labs   Lab 05/28/25  1527   WBC 7.61   HGB 14.8   HCT 45.3      MCV 90   RDW 13.1        Chemistries:  Recent Labs   Lab 05/28/25  1527      K 3.9      CO2 23   BUN 20   CREATININE 0.8   CALCIUM 9.6   ALBUMIN 4.3   PROT 7.7   BILITOT 0.3   ALKPHOS 78   ALT 38   AST 23       All pertinent labs within the past 24  hours have been reviewed.    Significant Imaging:   I have reviewed all pertinent imaging results/findings within the past 24 hours.

## 2025-05-29 NOTE — CONSULTS
[facility] ([facility])  Critical Care Medicine  Consult Note    Patient Name: Chel Thompson  MRN: [id number]  Admission Date: 5/29/2025  Hospital Length of Stay: 0 days  Code Status: Full Code  Attending Physician: Yolie Lieberman MD   Primary Care Provider: Yesica Burk MD   Principal Problem: <principal problem not specified>    Consults  Subjective:     HPI:  Ms. Thompson is a 64-year-old female history of hypertension who presented for a Whipple procedure for IPMN.  Patient has a history of having multiple episodes of pancreatitis.  Patient had an MRI of her spine for pain but also showed multiocular cystic mass at the head of the pancreas.  Patient then had a MRI MRCP of the abdomen in November.  This showed dilation of the main pancreatic duct.  Patient is now in PACU status post procedure.  ICU consulted for ICU management and monitoring postop.  Patient is currently drowsy and unable to provide a history.  Vital signs are stable.  She does have 2 KEVIN drains.    Hospital/ICU Course:  No notes on file    Past Medical History:   Diagnosis Date    Herpes genitalis in women 05/27/2025    Hypertension     Hypertensive heart disease without heart failure 02/04/2021    Intraductal papillary mucinous neoplasm of pancreas 03/04/2025    Pancreatitis     4 times (2009,2013)    UTI (urinary tract infection)        Past Surgical History:   Procedure Laterality Date    COLONOSCOPY N/A 8/22/2024    Procedure: COLONOSCOPY;  Surgeon: Kya Garcia MD;  Location: [facility];  Service: Endoscopy;  Laterality: N/A;    DILATION AND CURETTAGE OF UTERUS      ENDOSCOPIC ULTRASOUND OF UPPER GASTROINTESTINAL TRACT N/A 2/26/2025    Procedure: ULTRASOUND, UPPER GI TRACT, ENDOSCOPIC;  Surgeon: Abdi Mtz MD;  Location: [facility] ENDO;  Service: Endoscopy;  Laterality: N/A;  2/21 portal-EUS with anyone in next 2-3 weeks, either site should be ok. [id number]    MULTIPLE TOOTH EXTRACTIONS      vaginal delivery       varicose veins         Review of patient's allergies indicates:   Allergen Reactions    Dilaudid [hydromorphone] Itching     From IV    Nitrofurantoin Other (See Comments)     Cramping of legs       Family History       Problem Relation (Age of Onset)    Asthma Mother    COPD Brother    Cancer Maternal Grandmother, Maternal Grandfather    Hypertension Mother          Tobacco Use    Smoking status: Never    Smokeless tobacco: Never   Substance and Sexual Activity    Alcohol use: Not Currently     Comment: occasionally    Drug use: No    Sexual activity: Yes     Partners: Male         Review of Systems   Reason unable to perform ROS: unable to give.     Objective:     Vital Signs (Most Recent):  Temp: 97.9 °F (36.6 °C) (05/29/25 0544)  Pulse: 83 (05/29/25 0544)  Resp: 18 (05/29/25 0544)  BP: 123/87 (05/29/25 0544)  SpO2: 100 % (05/29/25 0544) Vital Signs (24h Range):  Temp:  [97.9 °F (36.6 °C)] 97.9 °F (36.6 °C)  Pulse:  [83] 83  Resp:  [18] 18  SpO2:  [100 %] 100 %  BP: (123)/(87) 123/87     Weight: 92.3 kg (203 lb 7.8 oz)  Body mass index is 30.94 kg/m².      Intake/Output Summary (Last 24 hours) at 5/29/2025 1740  Last data filed at 5/29/2025 1708  Gross per 24 hour   Intake 2750 ml   Output 480 ml   Net 2270 ml        Physical Exam  Vitals and nursing note reviewed.   Constitutional:       Comments: Currently drowsy   HENT:      Head: Normocephalic and atraumatic.   Eyes:      General:         Right eye: No discharge.         Left eye: No discharge.   Cardiovascular:      Rate and Rhythm: Normal rate and regular rhythm.   Pulmonary:      Effort: No respiratory distress.      Breath sounds: No wheezing.   Abdominal:      Comments: Incision / 2 ana drains    Musculoskeletal:         General: No swelling or tenderness.      Cervical back: Neck supple.   Neurological:      Comments: drowsy          Vents:       Lines/Drains/Airways       Central Venous Catheter Line  Duration             Percutaneous Central Line -  "Triple Lumen  05/29/25 0730 Internal Jugular Right <1 day              Drain  Duration                  Closed/Suction Drain 05/29/25 Tube - 1 Right RUQ Bulb 19 Fr. <1 day         Closed/Suction Drain 05/29/25 Tube - 2 Lateral;Right RUQ Bulb 19 Fr. <1 day         NG/OG Tube 05/29/25 1715 16 Fr. Right nostril <1 day         Urethral Catheter 05/29/25 0730 Silicone;Non-latex;Straight-tip 16 Fr. <1 day              Arterial Line  Duration             Arterial Line 05/29/25 0716 Right Radial <1 day              Peripheral Intravenous Line  Duration                  Peripheral IV - Single Lumen 05/29/25 0554 20 G 1 in No Anterior;Left Forearm <1 day                    Significant Labs:    CBC/Anemia Profile:  Recent Labs   Lab 05/28/25  1527   WBC 7.61   HGB 14.8   HCT 45.3      MCV 90   RDW 13.1        Chemistries:  Recent Labs   Lab 05/28/25  1527      K 3.9      CO2 23   BUN 20   CREATININE 0.8   CALCIUM 9.6   ALBUMIN 4.3   PROT 7.7   BILITOT 0.3   ALKPHOS 78   ALT 38   AST 23       All pertinent labs within the past 24 hours have been reviewed.    Significant Imaging:   I have reviewed all pertinent imaging results/findings within the past 24 hours.    ABG  No results for input(s): "PH", "PO2", "PCO2", "HCO3", "BE" in the last 168 hours.  Assessment/Plan:     Cardiac/Vascular  Hypertension  IV prn's if needed    Oncology  Intraductal papillary mucinous neoplasm of pancreas  Now post op Whipple procedure  Labs  Plan per surgery   IS   Pt/ot     GI  H/O acute pancreatitis  Monitor        Scds     Thank you for your consult. I will follow-up with patient. Please contact us if you have any additional questions.     Maine Santoro MD  Critical Care Medicine  [facility] ([facility])  "

## 2025-05-29 NOTE — ASSESSMENT & PLAN NOTE
-BP controlled in office  -She currently takes Valsartan-HCTZ, which she will hold the morning of surgery. May resume postop if BP permits

## 2025-05-29 NOTE — ANESTHESIA PROCEDURE NOTES
YOAN Block    Patient location during procedure: pre-op   Block not for primary anesthetic.  Reason for block: at surgeon's request and post-op pain management   Post-op Pain Location: abdominal pain   Start time: 5/29/2025 6:59 AM  Timeout: 5/29/2025 6:58 AM   End time: 5/29/2025 7:11 AM    Staffing  Authorizing Provider: Yogi Brar II, MD  Performing Provider: Yogi Brar II, MD    Staffing  Performed by: Yogi Brar II, MD  Authorized by: Yogi Brar II, MD    Preanesthetic Checklist  Completed: patient identified, IV checked, site marked, risks and benefits discussed, surgical consent, monitors and equipment checked, pre-op evaluation and timeout performed  Peripheral Block  Patient position: sitting  Prep: ChloraPrep  Patient monitoring: heart rate, cardiac monitor, continuous pulse ox, continuous capnometry and frequent blood pressure checks  Block type: erector spinae plane  Laterality: bilateral  Injection technique: single shot  Interspace: T8-9    Needle  Needle type: Stimuplex   Needle gauge: 21 G  Needle length: 4 in  Needle localization: anatomical landmarks and ultrasound guidance   -ultrasound image captured on disc.  Assessment  Injection assessment: negative aspiration, negative parasthesia and local visualized surrounding nerve  Paresthesia pain: none  Heart rate change: no  Slow fractionated injection: yes  Pain Tolerance: comfortable throughout block and no complaints  Medications:    Medications: bupivacaine (pf) (MARCAINE) injection 0.25% - Perineural   20 mL - 5/29/2025 7:11:00 AM    Additional Notes  + exparel  (see anes record)    Patient tolerated well.  See [facility] RN record for vitals.

## 2025-05-29 NOTE — ANESTHESIA PROCEDURE NOTES
Intubation    Date/Time: 5/29/2025 7:16 AM    Performed by: Vini Knox CRNA  Authorized by: Yogi Brar II, MD    Intubation:     Intubated:  Postinduction    Mask Ventilation:  Easy mask    Attempts:  1    Attempted By:  Student    Method of Intubation:  Video laryngoscopy    Blade:  Kumar 3    Laryngeal View Grade: Grade I - full view of cords      Difficult Airway Encountered?: No      Complications:  None    Airway Device:  Oral endotracheal tube    Airway Device Size:  7.5    Style/Cuff Inflation:  Cuffed (inflated to minimal occlusive pressure)    Inflation Amount (mL):  6    Tube secured:  22    Secured at:  The lips    Placement Verified By:  Capnometry    Complicating Factors:  Small mouth    Findings Post-Intubation:  BS equal bilateral

## 2025-05-29 NOTE — TRANSFER OF CARE
"Anesthesia Transfer of Care Note    Patient: Chel Thompson    Procedure(s) Performed: Procedure(s) (LRB):  WHIPPLE PROCEDURE (N/A)    Patient location: PACU    Anesthesia Type: general    Transport from OR: Transported from OR on room air with adequate spontaneous ventilation    Post pain: adequate analgesia    Post assessment: no apparent anesthetic complications    Post vital signs: stable    Level of consciousness: sedated    Nausea/Vomiting: no nausea/vomiting    Complications: none    Transfer of care protocol was followed      Last vitals: Visit Vitals  /87 (BP Location: Right arm, Patient Position: Sitting)   Pulse 83   Temp 36.6 °C (97.9 °F) (Temporal)   Resp 18   Ht 5' 8" (1.727 m)   Wt 92.3 kg (203 lb 7.8 oz)   LMP 05/16/2015   SpO2 100%   Breastfeeding No   BMI 30.94 kg/m²     "

## 2025-05-29 NOTE — OP NOTE
[facility]  Surgical Oncology  Operative Note           Date of Procedure: 5/29/2025   Time: 5:50 PM    Procedure: Procedure(s) (LRB):  WHIPPLE PROCEDURE (N/A)     Surgeons and Role:     * Yolie Lieberman MD - Primary     * Luigi Rueda MD - Assisting      Pre-Operative Diagnosis:   Intraductal papillary mucinous neoplasm of pancreas [D49.0]    Post-Operative Diagnosis:   Same    Anesthesia: General    Procedure:  Open Whipple  Pedicled falciform flap    Operative Findings:   Dilated main duct, otherwise normal anatomy.  Negative for high grade dysplasia on frozen section.          Indications:  Chel Thompson is a 64 y.o. year old female who presented with a large dialted main duct and findings consistent with main duct IPMN.  Following discussion of the risk of development of pancreatic cancer she elected to undergo resection with a whipple.     Risks and benefits of whiple including death, bleeding, infection, scar, pain/numbness, margin positivity, discovery of additional disease, anastomotic leakage, pancreatic leakage/fistula, delayed gastric emptying, damage to local structures, need for open procedure, need for additional procedures based on operative findings and imponderables were all reviewed.  She was given the opportunity to ask questions, which were all addressed.  She voiced understanding and wishes to proceed. .     Procedure in Detail:    Pancreas remnant: soft, duct: 12 mm  PD stent:  No  Bile duct:  8 mm, thin-walled, bile: clear without sludge/debris  Reconstruction was performed with a retrocolic end to side, duct-to-mucosa Blumgart pancreaticojejunostomy, end-to-side hepaticojejunostomy followed by an antecolic side to side stapled gastrojejunostomy.    After informed consent was obtained and the patient was properly identified, the patient was taken to the operating room and placed in the supine position. After the uneventful induction of general anesthesia and  appropriate placement of lines, a time-out was performed according to the [facility] guidelines. A midline laparotomy incision was made and sharp dissection was carried down with Bovie electrocautery. The fascia was incised and the abdomen was entered. Upon entry into the abdomen, an extensive exploration was performed, and no evidence of carcinomatosis or metastatic disease was identified. Next, the falciform flap was gently dissected free and the anterior hepatic attachments were released to allow for placement of retractor.. The Caro retractor was placed. Mobilization then ensued to expose the head of the pancreas through the performance of an extensive Cattell-Braasch maneuver. The gastrocolic ligament was divided and the lesser sac was entered. The hepatic flexure was mobilized, dropping the transverse and right colon. Mobilization of the greater curve of the stomach continued and the anterior wall of the pancreas was exposed. The pancreas was visualized and inspected. The gland appeared soft with dilated duct in the head of the pancreas, there was significant fibrosis in the debi hepatis and around the head of the pancreas likely from the cyst..      An extensive Kocher maneuver was performed mobilizing the duodenum and the head of the pancreas medially until the vena cava, and left renal vein were exposed and visualized in the retroperitoneum. Careful assessment of the vena cava, aorta, superior mesenteric artery was performed to confirm resectablilty.  The middle colic vein was identified in the colonic mesentery and followed cephalad until the superior mesenteric vein was identified as it enters beneath the inferior aspect of the pancreas. Next, we proceeded with our portal dissection. The hepatic artery lymph node was identified, carefully dissected, and sent for final pathology. Once the lymph node was removed, the common hepatic artery was identified and encircled with a vessel loop. The  common hepatic artery was further dissected towards the hepatic hilum and the gastroduodenal artery takeoff was identified as well as the proper hepatic artery bifurcation with left and right hepatic arteries entering into the hilum.  The right gastric artery and vein were just anterior to the GDA and were doubly ligated with 2-0 silk ties followed by clips and LigaSure.  This allowed for access and visibility of the GDA.  Next, the gastroduodenal artery was encircled with a vessel loop and a clamp test was performed. Palpable pulses were appreciated in the common hepatic, left and right hepatic arteries while the gastroduodenal artery was clamped.  The GDA was tied with 2-0 silks both distally and proximally and then a 4-0 Prolene stick tie was performed on the GDA, leaving a little bit of a stump.  It was then transected with the Metzenbaum scissors.  I proceeded to divide the distal stomach at the level of the crow's feet between the body and antrum. This was done using blue Endo ASHWIN staple loads and was performed without incident.  A distal stomach that had been transected was then oversewed with 2-0 silk pops.  A cholecystectomy was performed, identifying the cystic duct and artery and transecting those and then tying them off with 2-0 silk ties followed by clips. The common bile duct was then dissected and encircled in its entirety and a vessel loop was placed. The common bile duct was transected and the proximal end clamped with a bulldog.  Duct itself was noted to be thin walled with clear bile coming out.    At this time, attention was then turned back to the inferior aspect of the pancreas and the plane anterior to the SMV was developed creating a tunnel on the superior aspect of the SMV/portal vein beneath the neck of the pancreas. A Penrose drain was placed through the tunnel encircling the neck of the pancreas. The pancreas was transected using Bovie electrocautery over the identified transection plane.  Once this was accomplished, the pancreatic duct was identified within the divided pancreatic neck.  It was thin-walled but very dilated enlarged, approximately 1 cm in diameter.      Attention was then turned to the proximal jejunum which was divided approximately 15-20cm distal to the ligament of Treitz. The proximal jejunum was freed from its mesentery using the Ligasure device and the ligament of Treitz was taken down. The proximal jejunum was flipped through the defect into the right side of the surgical field.  The distal small bowel for anastomosis was oversewn with 2-0 silk pops.  Next, the SMV/portal vein dissection ensued and the pancreas head and neck were freed from the attachments to the SMV/portal vein. This required suture and clip ligation of multiple small branches entering the pancreas head. After this was accomplished, we proceeded with our SMA dissection and the uncinate process was dissected free from the superior mesenteric artery, maintaining our dissection within the periadventitial plane. We did identify the anterior 1st jejunal which was preserved.  There was a little bit of bleeding from the portal vein branch which was managed with a 5 0 Prolene stitch. Once this was complete, the specimen was freed. It was marked on the back table with a short stitch in the pancreatic duct and a long stitch in the retroperitoneal margin.The specimen was and sent to pathology and for frozen section evaluation of the duct margins. After notification of the absence of dysplasia or malignancy, we proceeded with the reconstruction portion of this operation.     The divided distal portion of small bowel was brought through the same defect of the ligament of Treitz, and it reached easily without any undue twisting on the small bowel mesentery.  The bowel was positioned in a way to facilitate anastomosis to both the pancreas duct and the common hepatic duct and the pancreaticojejunostomy was performed initially.  This was performed in a modified Blumgart technique in two layers. The first layer utilized 3-0 Prolene transpancreatic sutures on either side of the pancreatic duct secured to the jejunum with seromuscular bites. A small enterostomy was created and the second layer was comprised of a duct-to-mucosa anastomosis between the pancreatic duct and the jejunum using interrupted 5-0 PDS sutures.  The anterior seromuscular sutures were then placed completing the pancreaticojejunal anastomosis. Our attention was turned to the hepaticojejunostomy. Using the Bovie, an enterostomy was created in the appropriate position in the jejunum and a single layer duct-to-mucosa anastomosis was performed using interrupted 5-0 PDS sutures. This was performed without incident and finally, our attention was turned to the gastrojejunal anastomosis.     For this anastomosis, a separate distal loop of jejunum was brought up to the posterior aspect of the remaining stomach. Stay sutures were placed and enterotomies were made both on the stomach and jejunal walls and a tan Endo ASHWIN staple load was utilized to create a stapled anastomosis between these 2 structures. Ring forceps was utilized to assess the staple line and no bleeding was appreciated. The common enterotomy was with a Tx blue load stapler and the anastomosis was oversewn with 2-0 silks.    At this time, the reconstruction was complete, the abdomen was thoroughly inspected and hemostasis was confirmed. Two 19-Zimbabwean Adiel drains were placed. The first superior drain was placed anterior to the hepaticojejunostomy over the top of the gastroduodenal stump and brought posterior to the gastrojejunal anastomosis, the second more inferior Adiel drain was placed in the retroperitoneum posterior to the pancreaticojejunal anastomosis adjacent to the portal vein. These were secured with nylon sutures. The abdomen again was inspected. Hemostasis was again confirmed and irrigation was completed.  The abdomen was closed with 2 #1 PDS sutures in a running fashion. The subcutaneous tissue was irrigated and t was closed in multiple layers using 3-0 Vicryl for the deep dermal and subcutaneous layers followed by 4-0 Monocryl for the skin and then Dermabond.    All lap, needle, and sponge counts were correct x2. The patient was extubated and taken to the recovery room in stable condition.        Portions of the record were created with [employer] Direct voice recognition software. This may lead to occasional typographical errors due to the inherent limitations of the software. Read the chart carefully and recognize, using context, where substitutions have occurred. Please do not hesitate to contact me directly if clarification is needed.    Implants: * No implants in log *    Drains: 19 Fr KEVIN x 2    Estimated Blood Loss (EBL):  75 mL    IVF:  2800cc     UOP:  500cc    Specimens:   - hernia sac  - hepatic artery LN  - gallbladder  - whipple  - omentum  - distal stomach             Condition: Good    Disposition: PACU - hemodynamically stable.               Yolie Lieberman MD      Surgical Oncology      5/29/2025, 5:50 PM

## 2025-05-29 NOTE — ASSESSMENT & PLAN NOTE
"Known risk factors for perioperative complications: HTN    Difficulty with intubation is not anticipated.    Cardiac Risk Estimation:     Revised Cardiac Risk Index for Pre-Operative Risk from [email or web address]  on 5/29/2025  ** All calculations should be rechecked by clinician prior to use **    RESULT SUMMARY:  1 points  RCRI Score    6.0 %  Risk of major cardiac event      INPUTS:  High-risk surgery --> 0 = No  History of ischemic heart disease --> 1 = Yes  History of congestive heart failure --> 0 = No  History of cerebrovascular disease --> 0 = No  Pre-operative treatment with insulin --> 0 = No  Pre-operative creatinine >2 mg/dL / 176.8 µmol/L --> 0 = No      1.) Preoperative workup as follows: chest x-ray, ECG, hemoglobin, hematocrit, electrolytes, creatinine, glucose, liver function studies.  2.) Change in medication regimen before surgery: no medications the morning of surgery.  3.) Prophylaxis for cardiac events with perioperative beta-blockers: not indicated.  4.) Invasive hemodynamic monitoring perioperatively: at the discretion of anesthesiologist.  5.) Deep vein thrombosis prophylaxis postoperatively: regimen to be chosen by surgical team.  6.) Surveillance for postoperative MI with ECG immediately postoperatively and on postoperati ve days 1 and 2 AND troponin levels 24 hours postoperatively and on day 4 or hospital discharge (whichever comes first): not indicated.  7.) Current medications which may produce withdrawal symptoms if withheld perioperatively: N/A  8.) Other measures: Postoperative hypertension management with IV hydralazine until able to take oral medications.  Postoperative incentive spirometry to prevent pneumonia.  Will need cardiac clearance from [facility]. [facility] at [facility] -- hx of HTN, "leaky valves", hx of abnormal stress test in 2023 w/ [facility]. Had stress test in 2024 w/ [facility], per patient was normal.   No meds the morning of surgery -- takes flexeril and trazodone nightly, hold valsartan-hctz " morning of surgery

## 2025-05-30 LAB
ABSOLUTE EOSINOPHIL (OHS): 0.01 K/UL
ABSOLUTE EOSINOPHIL (OHS): 0.36 K/UL
ABSOLUTE MONOCYTE (OHS): 0.98 K/UL (ref 0.3–1)
ABSOLUTE MONOCYTE (OHS): 1.05 K/UL (ref 0.3–1)
ABSOLUTE NEUTROPHIL COUNT (OHS): 10.28 K/UL (ref 1.8–7.7)
ABSOLUTE NEUTROPHIL COUNT (OHS): 13.59 K/UL (ref 1.8–7.7)
ALBUMIN SERPL BCP-MCNC: 3 G/DL (ref 3.5–5.2)
ALP SERPL-CCNC: 45 UNIT/L (ref 40–150)
ALT SERPL W/O P-5'-P-CCNC: 241 UNIT/L (ref 10–44)
AMYLASE FLD-CCNC: 5607 U/L
AMYLASE FLD-CCNC: NORMAL U/L
AMYLASE SERPL-CCNC: 269 UNIT/L (ref 20–110)
ANION GAP (OHS): 10 MMOL/L (ref 8–16)
AST SERPL-CCNC: 146 UNIT/L (ref 11–45)
BASOPHILS # BLD AUTO: 0.03 K/UL
BASOPHILS # BLD AUTO: 0.03 K/UL
BASOPHILS NFR BLD AUTO: 0.2 %
BASOPHILS NFR BLD AUTO: 0.2 %
BILIRUB SERPL-MCNC: 0.9 MG/DL (ref 0.1–1)
BUN SERPL-MCNC: 24 MG/DL (ref 8–23)
CALCIUM SERPL-MCNC: 7.4 MG/DL (ref 8.7–10.5)
CHLORIDE SERPL-SCNC: 112 MMOL/L (ref 95–110)
CO2 SERPL-SCNC: 17 MMOL/L (ref 23–29)
CREAT SERPL-MCNC: 0.7 MG/DL (ref 0.5–1.4)
ERYTHROCYTE [DISTWIDTH] IN BLOOD BY AUTOMATED COUNT: 13.2 % (ref 11.5–14.5)
ERYTHROCYTE [DISTWIDTH] IN BLOOD BY AUTOMATED COUNT: 13.4 % (ref 11.5–14.5)
GFR SERPLBLD CREATININE-BSD FMLA CKD-EPI: >60 ML/MIN/1.73/M2
GLUCOSE SERPL-MCNC: 144 MG/DL (ref 70–110)
HCT VFR BLD AUTO: 35.1 % (ref 37–48.5)
HCT VFR BLD AUTO: 36.2 % (ref 37–48.5)
HGB BLD-MCNC: 11.9 GM/DL (ref 12–16)
HGB BLD-MCNC: 12.4 GM/DL (ref 12–16)
IMM GRANULOCYTES # BLD AUTO: 0.06 K/UL (ref 0–0.04)
IMM GRANULOCYTES # BLD AUTO: 0.07 K/UL (ref 0–0.04)
IMM GRANULOCYTES NFR BLD AUTO: 0.4 % (ref 0–0.5)
IMM GRANULOCYTES NFR BLD AUTO: 0.5 % (ref 0–0.5)
LYMPHOCYTES # BLD AUTO: 1.05 K/UL (ref 1–4.8)
LYMPHOCYTES # BLD AUTO: 1.15 K/UL (ref 1–4.8)
MAGNESIUM SERPL-MCNC: 1.7 MG/DL (ref 1.6–2.6)
MCH RBC QN AUTO: 30.3 PG (ref 27–31)
MCH RBC QN AUTO: 30.7 PG (ref 27–31)
MCHC RBC AUTO-ENTMCNC: 33.9 G/DL (ref 32–36)
MCHC RBC AUTO-ENTMCNC: 34.3 G/DL (ref 32–36)
MCV RBC AUTO: 89 FL (ref 82–98)
MCV RBC AUTO: 91 FL (ref 82–98)
NUCLEATED RBC (/100WBC) (OHS): 0 /100 WBC
NUCLEATED RBC (/100WBC) (OHS): 0 /100 WBC
PHOSPHATE SERPL-MCNC: 3.3 MG/DL (ref 2.7–4.5)
PLATELET # BLD AUTO: 180 K/UL (ref 150–450)
PLATELET # BLD AUTO: 223 K/UL (ref 150–450)
PMV BLD AUTO: 8.8 FL (ref 9.2–12.9)
PMV BLD AUTO: 9.1 FL (ref 9.2–12.9)
POCT GLUCOSE: 119 MG/DL (ref 70–110)
POCT GLUCOSE: 126 MG/DL (ref 70–110)
POCT GLUCOSE: 140 MG/DL (ref 70–110)
POCT GLUCOSE: 149 MG/DL (ref 70–110)
POTASSIUM SERPL-SCNC: 3.5 MMOL/L (ref 3.5–5.1)
PROT SERPL-MCNC: 5.3 GM/DL (ref 6–8.4)
RBC # BLD AUTO: 3.88 M/UL (ref 4–5.4)
RBC # BLD AUTO: 4.09 M/UL (ref 4–5.4)
RELATIVE EOSINOPHIL (OHS): 0.1 %
RELATIVE EOSINOPHIL (OHS): 2.8 %
RELATIVE LYMPHOCYTE (OHS): 7.2 % (ref 18–48)
RELATIVE LYMPHOCYTE (OHS): 8.2 % (ref 18–48)
RELATIVE MONOCYTE (OHS): 6.6 % (ref 4–15)
RELATIVE MONOCYTE (OHS): 7.7 % (ref 4–15)
RELATIVE NEUTROPHIL (OHS): 80.6 % (ref 38–73)
RELATIVE NEUTROPHIL (OHS): 85.5 % (ref 38–73)
SODIUM SERPL-SCNC: 139 MMOL/L (ref 136–145)
WBC # BLD AUTO: 12.76 K/UL (ref 3.9–12.7)
WBC # BLD AUTO: 15.9 K/UL (ref 3.9–12.7)

## 2025-05-30 PROCEDURE — 94761 N-INVAS EAR/PLS OXIMETRY MLT: CPT

## 2025-05-30 PROCEDURE — 82150 ASSAY OF AMYLASE: CPT | Performed by: SURGERY

## 2025-05-30 PROCEDURE — 25000003 PHARM REV CODE 250: Performed by: SURGERY

## 2025-05-30 PROCEDURE — 97530 THERAPEUTIC ACTIVITIES: CPT

## 2025-05-30 PROCEDURE — 63600175 PHARM REV CODE 636 W HCPCS: Performed by: NURSE PRACTITIONER

## 2025-05-30 PROCEDURE — 80053 COMPREHEN METABOLIC PANEL: CPT | Performed by: INTERNAL MEDICINE

## 2025-05-30 PROCEDURE — 97166 OT EVAL MOD COMPLEX 45 MIN: CPT

## 2025-05-30 PROCEDURE — 85025 COMPLETE CBC W/AUTO DIFF WBC: CPT | Performed by: INTERNAL MEDICINE

## 2025-05-30 PROCEDURE — 63600175 PHARM REV CODE 636 W HCPCS: Performed by: SURGERY

## 2025-05-30 PROCEDURE — 51701 INSERT BLADDER CATHETER: CPT

## 2025-05-30 PROCEDURE — 27000221 HC OXYGEN, UP TO 24 HOURS

## 2025-05-30 PROCEDURE — 20000000 HC ICU ROOM

## 2025-05-30 PROCEDURE — 51798 US URINE CAPACITY MEASURE: CPT

## 2025-05-30 PROCEDURE — 63600175 PHARM REV CODE 636 W HCPCS: Performed by: INTERNAL MEDICINE

## 2025-05-30 PROCEDURE — 84100 ASSAY OF PHOSPHORUS: CPT | Performed by: INTERNAL MEDICINE

## 2025-05-30 PROCEDURE — 97162 PT EVAL MOD COMPLEX 30 MIN: CPT

## 2025-05-30 PROCEDURE — 83735 ASSAY OF MAGNESIUM: CPT | Performed by: INTERNAL MEDICINE

## 2025-05-30 RX ORDER — MAGNESIUM SULFATE HEPTAHYDRATE 40 MG/ML
2 INJECTION, SOLUTION INTRAVENOUS
Status: DISCONTINUED | OUTPATIENT
Start: 2025-05-30 | End: 2025-06-05 | Stop reason: HOSPADM

## 2025-05-30 RX ORDER — METOCLOPRAMIDE HYDROCHLORIDE 5 MG/ML
10 INJECTION INTRAMUSCULAR; INTRAVENOUS EVERY 6 HOURS
Status: DISCONTINUED | OUTPATIENT
Start: 2025-05-30 | End: 2025-06-05 | Stop reason: HOSPADM

## 2025-05-30 RX ORDER — POTASSIUM CHLORIDE 14.9 MG/ML
60 INJECTION INTRAVENOUS
Status: DISCONTINUED | OUTPATIENT
Start: 2025-05-30 | End: 2025-06-05 | Stop reason: HOSPADM

## 2025-05-30 RX ORDER — SODIUM CHLORIDE, SODIUM LACTATE, POTASSIUM CHLORIDE, CALCIUM CHLORIDE 600; 310; 30; 20 MG/100ML; MG/100ML; MG/100ML; MG/100ML
INJECTION, SOLUTION INTRAVENOUS CONTINUOUS
Status: DISCONTINUED | OUTPATIENT
Start: 2025-05-30 | End: 2025-05-31

## 2025-05-30 RX ORDER — POTASSIUM CHLORIDE 29.8 MG/ML
40 INJECTION INTRAVENOUS
Status: DISCONTINUED | OUTPATIENT
Start: 2025-05-30 | End: 2025-06-05 | Stop reason: HOSPADM

## 2025-05-30 RX ORDER — FAMOTIDINE 10 MG/ML
20 INJECTION, SOLUTION INTRAVENOUS 2 TIMES DAILY
Status: DISCONTINUED | OUTPATIENT
Start: 2025-05-30 | End: 2025-06-02

## 2025-05-30 RX ORDER — POTASSIUM CHLORIDE 29.8 MG/ML
80 INJECTION INTRAVENOUS
Status: DISCONTINUED | OUTPATIENT
Start: 2025-05-30 | End: 2025-06-05 | Stop reason: HOSPADM

## 2025-05-30 RX ADMIN — PIPERACILLIN SODIUM AND TAZOBACTAM SODIUM 4.5 G: 4; .5 INJECTION, POWDER, FOR SOLUTION INTRAVENOUS at 08:05

## 2025-05-30 RX ADMIN — MUPIROCIN: 20 OINTMENT TOPICAL at 09:05

## 2025-05-30 RX ADMIN — MORPHINE SULFATE: 1 INJECTION INTRAVENOUS at 04:05

## 2025-05-30 RX ADMIN — PIPERACILLIN SODIUM AND TAZOBACTAM SODIUM 4.5 G: 4; .5 INJECTION, POWDER, FOR SOLUTION INTRAVENOUS at 02:05

## 2025-05-30 RX ADMIN — MAGNESIUM SULFATE HEPTAHYDRATE 2 G: 40 INJECTION, SOLUTION INTRAVENOUS at 06:05

## 2025-05-30 RX ADMIN — MORPHINE SULFATE: 1 INJECTION INTRAVENOUS at 01:05

## 2025-05-30 RX ADMIN — SODIUM CHLORIDE: 9 INJECTION, SOLUTION INTRAVENOUS at 04:05

## 2025-05-30 RX ADMIN — CHLORHEXIDINE GLUCONATE 0.12% ORAL RINSE 10 ML: 1.2 LIQUID ORAL at 09:05

## 2025-05-30 RX ADMIN — CHLORHEXIDINE GLUCONATE 0.12% ORAL RINSE 10 ML: 1.2 LIQUID ORAL at 08:05

## 2025-05-30 RX ADMIN — ACETAMINOPHEN 1000 MG: 10 INJECTION INTRAVENOUS at 08:05

## 2025-05-30 RX ADMIN — FAMOTIDINE 20 MG: 10 INJECTION, SOLUTION INTRAVENOUS at 08:05

## 2025-05-30 RX ADMIN — MUPIROCIN: 20 OINTMENT TOPICAL at 08:05

## 2025-05-30 RX ADMIN — SODIUM CHLORIDE, POTASSIUM CHLORIDE, SODIUM LACTATE AND CALCIUM CHLORIDE: 600; 310; 30; 20 INJECTION, SOLUTION INTRAVENOUS at 08:05

## 2025-05-30 RX ADMIN — PIPERACILLIN SODIUM AND TAZOBACTAM SODIUM 4.5 G: 4; .5 INJECTION, POWDER, FOR SOLUTION INTRAVENOUS at 11:05

## 2025-05-30 RX ADMIN — ACETAMINOPHEN 1000 MG: 10 INJECTION INTRAVENOUS at 05:05

## 2025-05-30 RX ADMIN — POTASSIUM CHLORIDE 40 MEQ: 29.8 INJECTION, SOLUTION INTRAVENOUS at 07:05

## 2025-05-30 RX ADMIN — PIPERACILLIN SODIUM AND TAZOBACTAM SODIUM 4.5 G: 4; .5 INJECTION, POWDER, FOR SOLUTION INTRAVENOUS at 12:05

## 2025-05-30 RX ADMIN — METOCLOPRAMIDE 10 MG: 5 INJECTION, SOLUTION INTRAMUSCULAR; INTRAVENOUS at 11:05

## 2025-05-30 RX ADMIN — FAMOTIDINE 20 MG: 10 INJECTION, SOLUTION INTRAVENOUS at 09:05

## 2025-05-30 RX ADMIN — METOCLOPRAMIDE 10 MG: 5 INJECTION, SOLUTION INTRAMUSCULAR; INTRAVENOUS at 05:05

## 2025-05-30 NOTE — PT/OT/SLP EVAL
"Occupational Therapy   Evaluation    Name: Chel Thompson  MRN: [id number]  Admitting Diagnosis: Intraductal papillary mucinous neoplasm of pancreas  Recent Surgery: Procedure(s) (LRB):  WHIPPLE PROCEDURE (N/A) 1 Day Post-Op    Recommendations:     Discharge Recommendations: Low Intensity Therapy  Discharge Equipment Recommendations:  none  Barriers to discharge:  None    Assessment:     Chel Thompson is a 64 y.o. female with a medical diagnosis of Intraductal papillary mucinous neoplasm of pancreas.  She presents with performance deficits affecting function: weakness, impaired endurance, impaired self care skills, impaired functional mobility, gait instability, impaired balance, decreased lower extremity function, decreased safety awareness.      Rehab Prognosis: Good; patient would benefit from acute skilled OT services to address these deficits and reach maximum level of function.       Plan:     Patient to be seen 2 x/week to address the above listed problems via self-care/home management, therapeutic activities, therapeutic exercises  Plan of Care Expires: 06/12/25  Plan of Care Reviewed with: patient    Subjective     Chief Complaint: Nothing of significance to report. Pt extremely pleasant and jovial throughout session.   Patient/Family Comments/goals: None reported.     Occupational Profile:  Living Environment: Pt lives in a [facility] home /c  and stepson.   Previous level of function: Pt was independent with all ADLs and ambulation prior to admit. Pt not on oxygen at home.   Roles and Routines: Pt drives and works at [employer].   Equipment Used at Home: none  Assistance upon Discharge: Pt has good support system at home. States she has "the best  in the [place]."     Pain/Comfort:  Pain Rating 1: 6/10    Objective:     EPIC chart review completed prior to session.  Patient found HOB elevated with blood pressure cuff, central line, KEVIN drain, oxygen, SCD, PureWick, peripheral IV, " pulse ox (continuous), telemetry, PCA, NG tube upon OT entry to room.    General Precautions: Standard, fall  Orthopedic Precautions: N/A  Braces: N/A  Respiratory Status: Nasal cannula, flow 3 L/min    Occupational Performance:    Bed Mobility:    Patient completed Rolling/Turning to Left with  minimum assistance  Patient completed Scooting/Bridging with minimum assistance  Patient completed Supine to Sit with minimum assistance    Functional Mobility/Transfers:  Patient completed Sit <> Stand Transfer with minimum assistance  with  rolling walker   Patient completed Bed <> Chair Transfer using Step Transfer technique with minimum assistance with rolling walker  Functional Mobility: Pt ambulated 70's x2 /c RW Min A for increased activity tolerance and ADL completion.     Activities of Daily Living:  Grooming: minimum assistance Gross assessment not able to be completed due to ICU line complexity. Will continue to assess throughout admit.    Cognitive/Visual Perceptual:  Cognitive/Psychosocial Skills:     -       Follows Commands/attention:Follows one-step commands  -       Communication: clear/fluent  -       Memory: No Deficits noted  -       Mood/Affect/Coping skills/emotional control: Happy and Pleasant    Physical Exam:  Balance:    -       Static Sitting: Min A   Standing Standing: Min A   Dynamic Standing: Min A  Upper Extremity Range of Motion:     -       Right Upper Extremity: WFL  -       Left Upper Extremity: WFL  Upper Extremity Strength:    -       Right Upper Extremity: 5/5  -       Left Upper Extremity: 5/5   Strength:    -       Right Upper Extremity: Good  -       Left Upper Extremity: Good    AMPAC 6 Click ADL:  AMPAC Total Score: 14    Treatment & Education:  Pt educated on roles and responsibilities of OT. Pt educated on importance of OOB activity and ROM exercises for increased mobility and strengthening. Pt educated on call don't fall for safety. Pt agreeable to POC.     Patient left up in  chair with all lines intact, call button in reach, and chair alarm on    GOALS:   Multidisciplinary Problems       Occupational Therapy Goals          Problem: Occupational Therapy    Goal Priority Disciplines Outcome Interventions   Occupational Therapy Goal     OT, PT/OT     Description: Goals to be met by: 06/13/25    Patient will increase functional independence with ADLs by performing:    Toileting from toilet with Stand-by Assistance for hygiene and clothing management.   Step transfer with Stand-by Assistance.  Increased functional strength by 1/2 MMT grade.                         DME Justifications:  No DME recommended requiring DME justifications    History:     Past Medical History:   Diagnosis Date    Herpes genitalis in women 05/27/2025    Hypertension     Hypertensive heart disease without heart failure 02/04/2021    Intraductal papillary mucinous neoplasm of pancreas 03/04/2025    Pancreatitis     4 times (2009,2013)    UTI (urinary tract infection)          Past Surgical History:   Procedure Laterality Date    COLONOSCOPY N/A 8/22/2024    Procedure: COLONOSCOPY;  Surgeon: Kya Garcia MD;  Location: [facility] ENDO;  Service: Endoscopy;  Laterality: N/A;    DILATION AND CURETTAGE OF UTERUS      ENDOSCOPIC ULTRASOUND OF UPPER GASTROINTESTINAL TRACT N/A 2/26/2025    Procedure: ULTRASOUND, UPPER GI TRACT, ENDOSCOPIC;  Surgeon: Abdi Mtz MD;  Location: [facility];  Service: Endoscopy;  Laterality: N/A;  2/21 portal-EUS with anyone in next 2-3 weeks, either site should be ok. [email or web address]    MULTIPLE TOOTH EXTRACTIONS      vaginal delivery      varicose veins         Time Tracking:     OT Date of Treatment: 05/30/25  OT Start Time: 1130  OT Stop Time: 1200  OT Total Time (min): 30 min    Billable Minutes:Evaluation 10  Therapeutic Activity 20    MARISOL Alvarado  5/30/2025

## 2025-05-30 NOTE — EICU
Virtual ICU Quality Rounds    Admit Date: 5/29/2025  Hospital Day: 1    ICU Day: 20h    24H Vital Sign Range:  Temp:  [97.4 °F (36.3 °C)-99.1 °F (37.3 °C)]   Pulse:  []   Resp:  [10-28]   BP: ()/(55-93)   SpO2:  [93 %-100 %]   Arterial Line BP: ()/(53-83)     VICU Surveillance Screening    Daily review of  line necessity(optional): Central line(s) in place    Central line type (optional): Triple lumen catheter    Central line indications : Multiple infusions    LDA reconciliation : Yes

## 2025-05-30 NOTE — HOSPITAL COURSE
5/30/2025:  POD #1- doing well. NGT output bloody appearing, but minimal and hgb stable.  KEVIN drains serosanguinous.  Drain amylase ordered and pending. Anand removed and voiding.      5/31/2025:  POD #2- doing well.  Straight cath x2 overnight- anand replaced today.  KEVIN drains serosang- but amylases elevated yesterday.  NGT bilious today without evidence of bleeding.      6/1/2025:  POD #3- doing ok.  NGT removed yesterday after gravity trial.  Started on CLD.  No nausea/ vomiting. Still awaiting flatus.  Pulling 750 on IS.  Drain amylases down to 875 and 205    6/2/2025:  POD 4- still awaiting return of bowel function.  Advancing to FLD.  Anand out again.      6/3/2025:  POD #5- had BM.  Diet advanced to regular.  PCA stopped and started on PO medications.  Drain amylases checked and normalized    6/4/2025:  POD #6- doing well.  One episode emesis overnight.       6/5/2025:  POD #7- doing well.  CT scan today without any undrained fluid collections.  Tolerating regular diet.  KEVIN drains removed.  Appropriate for d/c home

## 2025-05-30 NOTE — ASSESSMENT & PLAN NOTE
Now post op Whipple procedure  Labs  Plan per surgery   IS   Pt/ot     5/30 plan per surgery   Pain control   IS

## 2025-05-30 NOTE — EICU
"Virtual ICU Admission    Admit Date: 2025  LOS: 0  Code Status: Full Code   : 1960  Bed: A  19:     Diagnosis: <principal problem not specified>    Patient  has a past medical history of Herpes genitalis in women, Hypertension, Hypertensive heart disease without heart failure, Intraductal papillary mucinous neoplasm of pancreas, Pancreatitis, and UTI (urinary tract infection).    Last VS: BP (!) 185/88   Pulse 94   Temp 98 °F (36.7 °C) (Temporal)   Resp (!) 25   Ht 5' 8" (1.727 m)   Wt 92.3 kg (203 lb 7.8 oz)   LMP 2015   SpO2 100%   Breastfeeding No   BMI 30.94 kg/m²       VICU Review    VICU nurse assessment :  [place] completed, LDA documentation reconciliation completed, Stress ulcer prophylaxis for ventilated patients review, and VTE prophylaxis review        Nursing orders placed : IP OMER Central Line Care, IP OMER Peripheral IV Access, and Mckeon Panel          "

## 2025-05-30 NOTE — HOSPITAL COURSE
5/30 no acute events  No complaints   05/31/2025: Continued pain, using PCA. Up to chair this morning. Encouraging IS/ambulation. Urinary retention requiring straight cath overnight. Cont ICU care.  06/01/2025: Episode of agitation overnight, possible ICU delirium, received Haldol. Back to baseline this morning, calm. Stable to SD from an ICU standpoint.

## 2025-05-30 NOTE — SUBJECTIVE & OBJECTIVE
Interval History: AFVSS.  KELLEY.  Pain controlled.  Mckeon out and voiding.  KEVIN drains serosanguinous.  NGT output bloody- bilious appearing- hgb stable.     Medications:  Continuous Infusions:   lactated ringers   Intravenous Continuous 100 mL/hr at 05/30/25 1100 Rate Verify at 05/30/25 1100    morphine   Intravenous Continuous   New Syringe/Bag at 05/30/25 0443     Scheduled Meds:   acetaminophen  1,000 mg Intravenous Q8H    chlorhexidine  10 mL Mouth/Throat BID    famotidine (PF)  20 mg Intravenous BID    mupirocin   Nasal BID    piperacillin-tazobactam (Zosyn) IV (PEDS and ADULTS) (extended infusion is not appropriate)  4.5 g Intravenous Q8H     PRN Meds:  Current Facility-Administered Medications:     dextrose 50%, 12.5 g, Intravenous, PRN    glucagon (human recombinant), 1 mg, Intramuscular, PRN    insulin aspart U-100, 0-5 Units, Subcutaneous, Q6H PRN    magnesium sulfate 2 g IVPB, 2 g, Intravenous, PRN    magnesium sulfate 2 g IVPB, 2 g, Intravenous, PRN    naloxone, 0.02 mg, Intravenous, PRN    ondansetron, 4 mg, Intravenous, Q6H PRN    potassium chloride in water, 40 mEq, Intravenous, PRN **AND** potassium chloride in water, 60 mEq, Intravenous, PRN **AND** potassium chloride in water, 80 mEq, Intravenous, PRN     Review of patient's allergies indicates:   Allergen Reactions    Dilaudid [hydromorphone] Itching     From IV    Nitrofurantoin Other (See Comments)     Cramping of legs     Objective:     Vital Signs (Most Recent):  Temp: 97.6 °F (36.4 °C) (05/30/25 1115)  Pulse: 90 (05/30/25 1140)  Resp: 19 (05/30/25 1140)  BP: 120/79 (05/30/25 1135)  SpO2: 99 % (05/30/25 1140) Vital Signs (24h Range):  Temp:  [97.4 °F (36.3 °C)-99.1 °F (37.3 °C)] 97.6 °F (36.4 °C)  Pulse:  [] 90  Resp:  [10-28] 19  SpO2:  [93 %-100 %] 99 %  BP: (106-186)/(55-93) 120/79  Arterial Line BP: ()/(53-83) 129/70     Weight: 100.4 kg (221 lb 5.5 oz)  Body mass index is 33.65 kg/m².    Intake/Output - Last 3 Shifts          05/28 0700 05/29 0659 05/29 0700 05/30 0659 05/30 0700 05/31 0659    I.V. (mL/kg)  900.6 (9) 1078.1 (10.7)    IV Piggyback  2642.4 317.8    Total Intake(mL/kg)  3543 (35.3) 1395.8 (13.9)    Urine (mL/kg/hr)  1009 (0.4)     Drains  375     Total Output  1384     Net  +2159 +1395.8                    Physical Exam  Constitutional:       General: She is not in acute distress.     Appearance: Normal appearance.   HENT:      Head: Normocephalic and atraumatic.      Nose:      Comments: NGT in place- bloody/ bilious output   Eyes:      Extraocular Movements: Extraocular movements intact.      Conjunctiva/sclera: Conjunctivae normal.   Cardiovascular:      Rate and Rhythm: Normal rate and regular rhythm.   Pulmonary:      Effort: Pulmonary effort is normal.   Abdominal:      General: Abdomen is flat. There is distension.      Palpations: Abdomen is soft. There is no mass.      Tenderness: There is no guarding or rebound.      Hernia: No hernia is present.      Comments: Appropriately TTP, incision c/d/I- with bruising, KEVIN drains serosanguinous    Musculoskeletal:         General: No swelling, tenderness, deformity or signs of injury. Normal range of motion.   Skin:     General: Skin is warm and dry.      Coloration: Skin is not jaundiced.   Neurological:      General: No focal deficit present.      Mental Status: She is alert and oriented to person, place, and time.   Psychiatric:         Mood and Affect: Mood normal.         Behavior: Behavior normal.         Thought Content: Thought content normal.     -- h     Significant Labs:  I have reviewed all pertinent lab results within the past 24 hours.  CBC:   Recent Labs   Lab 05/30/25 0413   WBC 12.76*   RBC 4.09   HGB 12.4   HCT 36.2*      MCV 89   MCH 30.3   MCHC 34.3     CMP:   Recent Labs   Lab 05/30/25 0413   *   CALCIUM 7.4*   ALBUMIN 3.0*   PROT 5.3*      K 3.5   CO2 17*   *   BUN 24*   CREATININE 0.7   ALKPHOS 45   *   *    BILITOT 0.9       Significant Diagnostics:  I have reviewed all pertinent imaging results/findings within the past 24 hours.

## 2025-05-30 NOTE — PLAN OF CARE
P.T. EVAL COMPLETE.  PT CURRENTLY REQUIRES AMRJAN FOR BED MOBILITY, MARJAN FOR SIT<>STAND TF, CGA FOR GAIT WITH RW.  P.T. RECOMMENDS LOW INTENSITY THERAPY UPON DISCHARGE WITH 24 HOUR CARE

## 2025-05-30 NOTE — PROGRESS NOTES
[facility] - Intensive Care ([facility])  General Surgery  Progress Note    Subjective:     History of Present Illness:  No notes on file    Post-Op Info:  Procedure(s) (LRB):  WHIPPLE PROCEDURE (N/A)   1 Day Post-Op     Interval History: AFVSS.  KELLEY.  Pain controlled.  Mckeon out and voiding.  KEVIN drains serosanguinous.  NGT output bloody- bilious appearing- hgb stable.     Medications:  Continuous Infusions:   lactated ringers   Intravenous Continuous 100 mL/hr at 05/30/25 1100 Rate Verify at 05/30/25 1100    morphine   Intravenous Continuous   New Syringe/Bag at 05/30/25 0443     Scheduled Meds:   acetaminophen  1,000 mg Intravenous Q8H    chlorhexidine  10 mL Mouth/Throat BID    famotidine (PF)  20 mg Intravenous BID    mupirocin   Nasal BID    piperacillin-tazobactam (Zosyn) IV (PEDS and ADULTS) (extended infusion is not appropriate)  4.5 g Intravenous Q8H     PRN Meds:  Current Facility-Administered Medications:     dextrose 50%, 12.5 g, Intravenous, PRN    glucagon (human recombinant), 1 mg, Intramuscular, PRN    insulin aspart U-100, 0-5 Units, Subcutaneous, Q6H PRN    magnesium sulfate 2 g IVPB, 2 g, Intravenous, PRN    magnesium sulfate 2 g IVPB, 2 g, Intravenous, PRN    naloxone, 0.02 mg, Intravenous, PRN    ondansetron, 4 mg, Intravenous, Q6H PRN    potassium chloride in water, 40 mEq, Intravenous, PRN **AND** potassium chloride in water, 60 mEq, Intravenous, PRN **AND** potassium chloride in water, 80 mEq, Intravenous, PRN     Review of patient's allergies indicates:   Allergen Reactions    Dilaudid [hydromorphone] Itching     From IV    Nitrofurantoin Other (See Comments)     Cramping of legs     Objective:     Vital Signs (Most Recent):  Temp: 97.6 °F (36.4 °C) (05/30/25 1115)  Pulse: 90 (05/30/25 1140)  Resp: 19 (05/30/25 1140)  BP: 120/79 (05/30/25 1135)  SpO2: 99 % (05/30/25 1140) Vital Signs (24h Range):  Temp:  [97.4 °F (36.3 °C)-99.1 °F (37.3 °C)] 97.6 °F (36.4 °C)  Pulse:  [] 90  Resp:  [10-28]  19  SpO2:  [93 %-100 %] 99 %  BP: (106-186)/(55-93) 120/79  Arterial Line BP: ()/(53-83) 129/70     Weight: 100.4 kg (221 lb 5.5 oz)  Body mass index is 33.65 kg/m².    Intake/Output - Last 3 Shifts         05/28 0700 05/29 0659 05/29 0700 05/30 0659 05/30 0700 05/31 0659    I.V. (mL/kg)  900.6 (9) 1078.1 (10.7)    IV Piggyback  2642.4 317.8    Total Intake(mL/kg)  3543 (35.3) 1395.8 (13.9)    Urine (mL/kg/hr)  1009 (0.4)     Drains  375     Total Output  1384     Net  +2159 +1395.8                    Physical Exam  Constitutional:       General: She is not in acute distress.     Appearance: Normal appearance.   HENT:      Head: Normocephalic and atraumatic.      Nose:      Comments: NGT in place- bloody/ bilious output   Eyes:      Extraocular Movements: Extraocular movements intact.      Conjunctiva/sclera: Conjunctivae normal.   Cardiovascular:      Rate and Rhythm: Normal rate and regular rhythm.   Pulmonary:      Effort: Pulmonary effort is normal.   Abdominal:      General: Abdomen is flat. There is distension.      Palpations: Abdomen is soft. There is no mass.      Tenderness: There is no guarding or rebound.      Hernia: No hernia is present.      Comments: Appropriately TTP, incision c/d/I- with bruising, KEVIN drains serosanguinous    Musculoskeletal:         General: No swelling, tenderness, deformity or signs of injury. Normal range of motion.   Skin:     General: Skin is warm and dry.      Coloration: Skin is not jaundiced.   Neurological:      General: No focal deficit present.      Mental Status: She is alert and oriented to person, place, and time.   Psychiatric:         Mood and Affect: Mood normal.         Behavior: Behavior normal.         Thought Content: Thought content normal.     -- h     Significant Labs:  I have reviewed all pertinent lab results within the past 24 hours.  CBC:   Recent Labs   Lab 05/30/25  0413   WBC 12.76*   RBC 4.09   HGB 12.4   HCT 36.2*      MCV 89   MCH  30.3   MCHC 34.3     CMP:   Recent Labs   Lab 05/30/25  0413   *   CALCIUM 7.4*   ALBUMIN 3.0*   PROT 5.3*      K 3.5   CO2 17*   *   BUN 24*   CREATININE 0.7   ALKPHOS 45   *   *   BILITOT 0.9       Significant Diagnostics:  I have reviewed all pertinent imaging results/findings within the past 24 hours.  Assessment/Plan:     * Intraductal papillary mucinous neoplasm of pancreas  POD #1- s/p open whipple.     Doing well.  As expected.  Awaiting return of bowel function.  NGT output bloody appearing but minimal and hgb is stable.  We will continue to monitor for worsening bleeding- if so, will need EGD.     -- NPO + IVF  -- NGT to LIWS  -- Morphine PCA for pain.  Holding home trazodone and flexeril.    -- starting reglan IV today   -- checking drain amylases today   -- encourage ambulation and IS  Dispo: continued ICU management    Lovenox (starting tomorrow assuming no bleeding) and SCDs for DVT ppx, Famotidine for GI ppx        Hypertension  -- holding home Diovan-HCT  -- appreciate ICU assistance with management        Yolie Lieberman MD  General Surgery  [facility] - Intensive Care ([facility])

## 2025-05-30 NOTE — ANESTHESIA POSTPROCEDURE EVALUATION
Anesthesia Post Evaluation    Patient: Chel Thompson    Procedure(s) Performed: Procedure(s) (LRB):  WHIPPLE PROCEDURE (N/A)    Final Anesthesia Type: general      Patient location during evaluation: PACU  Patient participation: Yes- Able to Participate  Level of consciousness: awake and alert  Post-procedure vital signs: reviewed and stable  Pain management: adequate  Airway patency: patent  JEREMY mitigation strategies: Verification of full reversal of neuromuscular block  PONV status at discharge: No PONV  Anesthetic complications: no      Cardiovascular status: hemodynamically stable  Respiratory status: spontaneous ventilation  Hydration status: euvolemic  Follow-up not needed.              Vitals Value Taken Time   /90 05/29/25 19:46   Temp 36.3 °C (97.4 °F) 05/29/25 17:23   Pulse 96 05/29/25 19:57   Resp 24 05/29/25 19:57   SpO2 100 % 05/29/25 19:57   Vitals shown include unfiled device data.      No case tracking events are documented in the log.      Pain/Meghan Score: Pain Rating Prior to Med Admin: 7 (5/29/2025  7:10 PM)  Meghan Score: 8 (5/29/2025  7:45 PM)

## 2025-05-30 NOTE — PLAN OF CARE
Pt AAOx4. NSR on monitor. BP stable. Afebrile. 3L O2 NC. NPO. NG to LIWS with dark red drainage. Urinary retention after anand removal. In & out cath x1. Due to void at 2100. Midline abdominal incision intact. KEVIN drains serosanguinous. Pt walked unit with PT/OT and up to chair for 3.5 hours. Pain controlled with PCA. Pt turned/repositioned with pillows & wedge. Heels floated. Central line intact. Art line removed. Bed low, wheels locked, alarms audible. POC reviewed.

## 2025-05-30 NOTE — SUBJECTIVE & OBJECTIVE
Interval History: pain controlled  No acute events       Objective:     Vital Signs (Most Recent):  Temp: 98.7 °F (37.1 °C) (05/30/25 0705)  Pulse: 84 (05/30/25 1020)  Resp: 11 (05/30/25 1020)  BP: 114/63 (05/30/25 1020)  SpO2: 100 % (05/30/25 1020) Vital Signs (24h Range):  Temp:  [97.4 °F (36.3 °C)-99.1 °F (37.3 °C)] 98.7 °F (37.1 °C)  Pulse:  [] 84  Resp:  [10-28] 11  SpO2:  [93 %-100 %] 100 %  BP: (106-186)/(55-93) 114/63  Arterial Line BP: ()/(53-83) 107/58     Weight: 100.4 kg (221 lb 5.5 oz)  Body mass index is 33.65 kg/m².      Intake/Output Summary (Last 24 hours) at 5/30/2025 1118  Last data filed at 5/30/2025 1000  Gross per 24 hour   Intake 4438.75 ml   Output 1134 ml   Net 3304.75 ml        Physical Exam  Vitals and nursing note reviewed.   HENT:      Head: Normocephalic and atraumatic.   Eyes:      General:         Right eye: No discharge.         Left eye: No discharge.   Cardiovascular:      Rate and Rhythm: Normal rate and regular rhythm.   Pulmonary:      Effort: No respiratory distress.      Breath sounds: No wheezing.   Abdominal:      Comments: Incision ana drain            Review of Systems   Constitutional: Negative.    HENT: Negative.     Respiratory: Negative.     Cardiovascular: Negative.    Gastrointestinal: Negative.    Genitourinary: Negative.    Musculoskeletal: Negative.        Vents:  Oxygen Concentration (%): 98 (05/29/25 1800)    Lines/Drains/Airways       Central Venous Catheter Line  Duration             Percutaneous Central Line - Triple Lumen  05/29/25 0730 Internal Jugular Right 1 day              Drain  Duration                  Closed/Suction Drain 05/29/25 Tube - 1 Right RUQ Bulb 19 Fr. 1 day         Closed/Suction Drain 05/29/25 Tube - 2 Lateral;Right RUQ Bulb 19 Fr. 1 day         NG/OG Tube 05/29/25 1715 16 Fr. Right nostril <1 day    Female External Urinary Catheter w/ Suction 05/30/25 0615 <1 day              Arterial Line  Duration             Arterial Line  05/29/25 0716 Right Radial 1 day              Peripheral Intravenous Line  Duration                  Peripheral IV - Single Lumen 05/29/25 0554 20 G 1 in No Anterior;Left Forearm 1 day                    Significant Labs:    CBC/Anemia Profile:  Recent Labs   Lab 05/28/25  1527 05/29/25  1803 05/30/25  0413   WBC 7.61 12.51 12.76*   HGB 14.8 13.4 12.4   HCT 45.3 38.8 36.2*    201 223   MCV 90 87 89   RDW 13.1 13.0 13.2        Chemistries:  Recent Labs   Lab 05/28/25  1527 05/29/25  1803 05/30/25  0413    139 139   K 3.9 3.9 3.5    106 112*   CO2 23 19* 17*   BUN 20 29* 24*   CREATININE 0.8 0.8 0.7   CALCIUM 9.6 8.4* 7.4*   ALBUMIN 4.3 3.7 3.0*   PROT 7.7 6.2 5.3*   BILITOT 0.3 1.5* 0.9   ALKPHOS 78 53 45   ALT 38 265* 241*   AST 23 186* 146*   MG  --   --  1.7   PHOS  --   --  3.3       All pertinent labs within the past 24 hours have been reviewed.    Significant Imaging:  I have reviewed all pertinent imaging results/findings within the past 24 hours.

## 2025-05-30 NOTE — PROGRESS NOTES
[facility] ([facility])  Critical Care Medicine  Progress Note    Patient Name: Chel Thompson  MRN: [id number]  Admission Date: 5/29/2025  Hospital Length of Stay: 1 days  Code Status: Full Code  Attending Provider: Yolie Lieberman MD  Primary Care Provider: Yesica Burk MD   Principal Problem: Intraductal papillary mucinous neoplasm of pancreas    Subjective:     HPI:  Ms. Thompson is a 64-year-old female history of hypertension who presented for a Whipple procedure for IPMN.  Patient has a history of having multiple episodes of pancreatitis.  Patient had an MRI of her spine for pain but also showed multiocular cystic mass at the head of the pancreas.  Patient then had a MRI MRCP of the abdomen in November.  This showed dilation of the main pancreatic duct.  Patient is now in PACU status post procedure.  ICU consulted for ICU management and monitoring postop.  Patient is currently drowsy and unable to provide a history.  Vital signs are stable.  She does have 2 KEVIN drains.    Hospital/ICU Course:  5/30 no acute events  No complaints     Interval History: pain controlled  No acute events       Objective:     Vital Signs (Most Recent):  Temp: 98.7 °F (37.1 °C) (05/30/25 0705)  Pulse: 84 (05/30/25 1020)  Resp: 11 (05/30/25 1020)  BP: 114/63 (05/30/25 1020)  SpO2: 100 % (05/30/25 1020) Vital Signs (24h Range):  Temp:  [97.4 °F (36.3 °C)-99.1 °F (37.3 °C)] 98.7 °F (37.1 °C)  Pulse:  [] 84  Resp:  [10-28] 11  SpO2:  [93 %-100 %] 100 %  BP: (106-186)/(55-93) 114/63  Arterial Line BP: ()/(53-83) 107/58     Weight: 100.4 kg (221 lb 5.5 oz)  Body mass index is 33.65 kg/m².      Intake/Output Summary (Last 24 hours) at 5/30/2025 1118  Last data filed at 5/30/2025 1000  Gross per 24 hour   Intake 4438.75 ml   Output 1134 ml   Net 3304.75 ml        Physical Exam  Vitals and nursing note reviewed.   HENT:      Head: Normocephalic and atraumatic.   Eyes:      General:         Right eye: No  discharge.         Left eye: No discharge.   Cardiovascular:      Rate and Rhythm: Normal rate and regular rhythm.   Pulmonary:      Effort: No respiratory distress.      Breath sounds: No wheezing.   Abdominal:      Comments: Incision ana drain            Review of Systems   Constitutional: Negative.    HENT: Negative.     Respiratory: Negative.     Cardiovascular: Negative.    Gastrointestinal: Negative.    Genitourinary: Negative.    Musculoskeletal: Negative.        Vents:  Oxygen Concentration (%): 98 (05/29/25 1800)    Lines/Drains/Airways       Central Venous Catheter Line  Duration             Percutaneous Central Line - Triple Lumen  05/29/25 0730 Internal Jugular Right 1 day              Drain  Duration                  Closed/Suction Drain 05/29/25 Tube - 1 Right RUQ Bulb 19 Fr. 1 day         Closed/Suction Drain 05/29/25 Tube - 2 Lateral;Right RUQ Bulb 19 Fr. 1 day         NG/OG Tube 05/29/25 1715 16 Fr. Right nostril <1 day    Female External Urinary Catheter w/ Suction 05/30/25 0615 <1 day              Arterial Line  Duration             Arterial Line 05/29/25 0716 Right Radial 1 day              Peripheral Intravenous Line  Duration                  Peripheral IV - Single Lumen 05/29/25 0554 20 G 1 in No Anterior;Left Forearm 1 day                    Significant Labs:    CBC/Anemia Profile:  Recent Labs   Lab 05/28/25  1527 05/29/25  1803 05/30/25  0413   WBC 7.61 12.51 12.76*   HGB 14.8 13.4 12.4   HCT 45.3 38.8 36.2*    201 223   MCV 90 87 89   RDW 13.1 13.0 13.2        Chemistries:  Recent Labs   Lab 05/28/25  1527 05/29/25  1803 05/30/25  0413    139 139   K 3.9 3.9 3.5    106 112*   CO2 23 19* 17*   BUN 20 29* 24*   CREATININE 0.8 0.8 0.7   CALCIUM 9.6 8.4* 7.4*   ALBUMIN 4.3 3.7 3.0*   PROT 7.7 6.2 5.3*   BILITOT 0.3 1.5* 0.9   ALKPHOS 78 53 45   ALT 38 265* 241*   AST 23 186* 146*   MG  --   --  1.7   PHOS  --   --  3.3       All pertinent labs within the past 24 hours have  "been reviewed.    Significant Imaging:  I have reviewed all pertinent imaging results/findings within the past 24 hours.    ABG  No results for input(s): "PH", "PO2", "PCO2", "HCO3", "BE" in the last 168 hours.  Assessment/Plan:     Cardiac/Vascular  Hypertension  IV prn's if needed    Oncology  * Intraductal papillary mucinous neoplasm of pancreas  Now post op Whipple procedure  Labs  Plan per surgery   IS   Pt/ot     5/30 plan per surgery   Pain control   IS    GI  H/O acute pancreatitis  Monitor            Maine Santoro MD  Critical Care Medicine  [facility] ([facility])  "

## 2025-05-30 NOTE — PLAN OF CARE
AAOx4. SPO2 100% on 2 L NC. HR NSR and BP normotensive throughout the shift. Mckeon in place with total measured UOP of 530 mL during this shift. Mckeon removed this AM per MD orders. Pain managed with PCA pump continued per MD orders see MAR for more details. POC reviewed with patient and family at bedside. Bed in lowest position, side rails up x 3, wheels locked, call light within reach, and alarms on and audible.

## 2025-05-30 NOTE — PROGRESS NOTES
Pharmacist Renal Dose Adjustment Note    Chel Thompson is a 64 y.o. female being treated with the medication famotidine.    Patient Data:    Vital Signs (Most Recent):  Temp: 98.1 °F (36.7 °C) (05/29/25 2300)  Pulse: 95 (05/30/25 0000)  Resp: 17 (05/30/25 0000)  BP: (!) 170/88 (05/30/25 0000)  SpO2: 100 % (05/30/25 0000) Vital Signs (72h Range):  Temp:  [97.4 °F (36.3 °C)-98.8 °F (37.1 °C)]   Pulse:  [83-99]   Resp:  [10-28]   BP: (123-186)/(69-93)   SpO2:  [93 %-100 %]   Arterial Line BP: (169-178)/(82-83)      Recent Labs   Lab 05/27/25  1215 05/28/25  1527 05/29/25  1803   CREATININE 0.8 0.8 0.8     Serum creatinine: 0.8 mg/dL 05/29/25 1803  Estimated creatinine clearance: 85.6 mL/min    Famotidine 20 mg IV once daily will be changed to famotidine 20 mg IV twice daily for CrCl > 50 ml/min.    Pharmacist's Name: Abebe Davis  Pharmacist's Extension: [phone]

## 2025-05-30 NOTE — EICU
EICU NOTE:    Patient has already been seen and evaluated by bedside critical care provider. Patient is s/p whipple procedure. Continue current supportive care.    Telemetry was reviewed. Medical records including notes, labs and imaging were reviewed.    Thank You for allowing EICU to participate in the care of the patient. Please call as needed    Kendall Luis MD  [facility]  [phone]

## 2025-05-30 NOTE — PLAN OF CARE
Pt tolerated therapy well. Completed bed mobility, STS, and ambulation /c RW Min A. Recommending low intensity therapy intervention upon D/C.

## 2025-05-30 NOTE — PLAN OF CARE
Nutrition Care Plan    Nutrition Diagnosis:   Increased nutrient needs  related to left leg stump wound as evidenced by estimated nutrient needs for wound healing  Food-and-nutrition-related knowledge deficit  related to nutrient needs for wound healing as evidenced by pt is unsure what he should eat to promote wound healing     Intervention:  Modified diet:   continue mod consistent carb diet as michele, encourage po of pro rich foods,  assisting w/menu choices  Modified beverage:   initiating high pro, low lj ONS, BID todaytoday  Multivitamin/mineral:  recommend mens MVI for home use d/t lack of variety of intakes  Other:   estimated nutrient needs:  5158-5699 lj/d (15-18  Lj/kg/BW),  gm/pro/d (1.5-2.0 gm/kg/IBW)    Purpose of the nutrition education:    Nutrition relationship to health/disease:    Recommended modifications:   pt educated on nutrient needs for wound healing, stressed need for protein to be eaten at all 3 meals, eat a variety of foods, consider use of MV after D/C , Pt given [facility] Consume Fact Sheet on Zinc      Monitoring and Evaluation:  Amount of food:   goal to consume >/=75% of meals/ONS daily, pt ate 100% for 1 meal-full liquid diet today  Skin:   goal to see physical signs of wound healing        [facility])  Initial Discharge Assessment       Primary Care Provider: Yesica Burk MD    Admission Diagnosis: Intraductal papillary mucinous neoplasm of pancreas [D49.0]  IPMN (intraductal papillary mucinous neoplasm) [D49.0]    Admission Date: 5/29/2025  Expected Discharge Date:     Transition of Care Barriers: None    Payor: BLUE CROSS BLUE SHIELD / Plan: BCBS ALL OUT OF STATE / Product Type: PPO /     Extended Emergency Contact Information  Primary Emergency Contact: Shen KIRK  Address: [address] [place] [address] [place]  Mobile Phone: [phone]  Relation: Spouse  Secondary Emergency Contact: Phillip Thomas  Address: [address]           [place]  Home Phone: [phone]  Relation: Son    Discharge Plan A: Home Health         [facility] [address]  [address] [place] [address]  Phone: [phone] Fax: [phone]    [id number] - [place], [place] - [address] AT [facility]  [address] [facility] [address]  Phone: [phone] Fax: [phone]      Initial Assessment (most recent)       Adult Discharge Assessment - 05/30/25 1343          Discharge Assessment    Assessment Type Discharge Planning Assessment     Confirmed/corrected address, phone number and insurance Yes     Confirmed Demographics Correct on Facesheet     Source of Information patient     Communicated DAVIDA with patient/caregiver Date not available/Unable to determine     People in Home spouse;child(kin), dependent     Name(s) of People in Home Spouse,Shen and step son, Couper     Do you expect to return to your current living situation? Yes     Do you have help at home or someone to help you manage your care at home? Yes     Who are your caregiver(s) and their phone number(s)? Spouse     Prior to hospitilization cognitive status: Alert/Oriented      Current cognitive status: Alert/Oriented     Walking or Climbing Stairs Difficulty no     Dressing/Bathing Difficulty no     Home Accessibility wheelchair accessible     Home Layout Able to live on 1st floor     Equipment Currently Used at Home none     Readmission within 30 days? No     Patient currently being followed by outpatient case management? No     Do you currently have service(s) that help you manage your care at home? No     Do you take prescription medications? Yes     Do you have prescription coverage? Yes     Do you have any problems affording any of your prescribed medications? No     Is the patient taking medications as prescribed? yes     Who is going to help you get home at discharge? family     How do you get to doctors appointments? car, drives self     Are you on dialysis? No     Do you take coumadin? No     Discharge Plan A Home Health     DME Needed Upon Discharge  none     Discharge Plan discussed with: Patient     Transition of Care Barriers None                   Anticipated DC dispo: home health   Prior Level of Function: independent with ADLs   People in home: spouse and step son (24)     Comments:  CM met with patient at bedside to introduce role and discuss discharge planning. Spouse will be help at home and can provide transport at time of discharge. Confirmed demographics, insurance, and emergency contacts. CM discharge needs depends on hospital progress. CM will continue following to assist with other needs. Discharge plan has been determined by review of patient's clinical status, future medical and therapeutic needs, and coverage/benefits for post-acute care in coordination with multidisciplinary team members.

## 2025-05-30 NOTE — PT/OT/SLP EVAL
Physical Therapy Evaluation and Treatment    Patient Name:  Chel Thompson   MRN:  [id number]    Recommendations:     Discharge Recommendations: Low Intensity Therapy   Discharge Equipment Recommendations: walker, rolling   Barriers to discharge: None-good family support    Assessment:     Chel Thompson is a 64 y.o. female admitted with a medical diagnosis of Intraductal papillary mucinous neoplasm of pancreas.  She presents with the following impairments/functional limitations: weakness, impaired endurance, impaired functional mobility, impaired balance, gait instability, pain, decreased safety awareness, impaired coordination.    Rehab Prognosis: Good; patient would benefit from acute skilled PT services to address these deficits and reach maximum level of function.    Recent Surgery: Procedure(s) (LRB):  WHIPPLE PROCEDURE (N/A) 1 Day Post-Op    Plan:     During this hospitalization, patient to be seen 3 x/week to address the identified rehab impairments via gait training, therapeutic activities, therapeutic exercises and progress toward the following goals:    Plan of Care Expires:  06/13/25    Subjective     Chief Complaint: NONE, EAGER TO WALK  Patient/Family Comments/goals: TO GET STRONGER, WANTS TO GET BACK TO WORK  Pain/Comfort:  Pain Rating 1: 6/10  Location 1: abdomen  Pain Addressed 1: Reposition (ACTIVITY PACING)    Patients cultural, spiritual, [employer] conflicts given the current situation:      Living Environment:  PT LIVES WITH SPOUSE AND STEP SON 1 STORY HOUSE 1 STEP TO ENTER  Prior to admission, patients level of function was AMBULATION INDEP COMMUNITY DISTANCES, DRIVES, WORKS 2 JOBS, INDEP WITH ADL'S.  Equipment used at home: none.  DME owned (not currently used): none.  Upon discharge, patient will have assistance from FAMILY.    Objective:     Communicated with NURSE BABIN prior to session.  Patient found supine with telemetry, KEVIN drain, NG tube, pulse ox  "(continuous), blood pressure cuff, peripheral IV, PureWick, anand catheter, PCA, central line, oxygen  upon PT entry to room.    General Precautions: Standard, fall  Orthopedic Precautions:N/A   Braces: N/A  Respiratory Status: Nasal cannula, flow 2 L/min    Exams:  Cognitive Exam:  Patient is oriented to Person, Place, Time, and Situation  Postural Exam:  Patient presented with the following abnormalities:    -       Rounded shoulders  Sensation:    -       Intact  RLE ROM: WFL  RLE Strength: WFL  LLE ROM: WFL  LLE Strength: WFL    Functional Mobility:  Bed Mobility:   SLOW MOVING WITH BED MOBILITY BUT GOOD EFFORT  Rolling Left:  minimum assistance  Scooting: stand by assistance-SEATED SCOOTING FWD/BWD IN BED AND CHAIR  Supine to Sit: minimum assistance-EDUCATED IN LOG ROLLING FOR BED MOBILITY  Transfers:     Sit to Stand:  minimum assistance with rolling walker  Bed to Chair: minimum assistance with  rolling walker  using  Step Transfer  Gait: PT AMB 50' X 2 TRIALS WITH RW AND CGA, CUES FOR UPRIGHT POSTURE AND RW SAFETY, NO LOB OR SOB  Balance: FAIR SITTING BALANCE, FAIR DYNAMIC BALANCE DURING GAIT  PT EDUCATED IN AND PERFORMED SEATED BLE THEREX X 10 REPS AROM WITH REST AS NEEDED: HIP FLEX/EXT, LAQ, QUAD SET, HEEL SLIDES, AP'S    AM-PAC 6 CLICK MOBILITY  Total Score:16     Treatment & Education:  PT EDUCATION:  - ROLE OF P.T. AND POC IN ACUTE CARE HOSPITAL SETTING  - RW USE AND SAFETY DURING TF'S AND GAIT  - EDUCATED ON IMPORTANCE OF ACTIVITY PACING FOR ENERGY CONSERVATION  - ENCOURAGED TO INCREASE TIME OOB IN CHAIR TO TOLERANCE   - TO CONTINUE THERAPUETIC EXERCISES THROUGHOUT THE DAY TO INCREASE ACTIVITY TOLERANCE AND DECREASE RISK FOR PNEUMONIA AND BLOOD CLOTS: HIP FLEX/EXT, HIP ABD/ADD, QUAD SET, HEEL SLIDE, AP  - RISK FOR FALLS DUE TO GENERALIZED WEAKNESS, EDUCATED ON "CALL DON'T FALL", ENCOURAGED TO CALL FOR ASSISTANCE WITH ALL NEEDS SUCH AS BED<>CHAIR TRANSFERS OR TRIPS TO BATHROOM, PT AGREEABLE TO SAFETY " PRECAUTIONS    Patient left up in chair with all lines intact, call button in reach, chair alarm on, and NURSE notified.    GOALS:   Multidisciplinary Problems       Physical Therapy Goals          Problem: Physical Therapy    Goal Priority Disciplines Outcome Interventions   Physical Therapy Goal     PT, PT/OT     Description: LTG'S TO BE MET IN 14 DAYS (6-13-25)  PT WILL BE JANETTE FOR BED MOBILITY  PT WILL BE JANETTE FOR BED<>CHAIR TF'S  PT WILL  FEET WITH OR WITHOUT RW AND JANETTE  PT WILL INC AMPAC SCORE BY 2 POINTS TO PROGRESS GROSS FUNC MOBILITY                         DME Justifications:   Chel's mobility limitation cannot be sufficiently resolved by the use of a cane. Her functional mobility deficit can be sufficiently resolved with the use of a Rolling Walker. Patient's mobility limitation significantly impairs their ability to participate in one of more activities of daily living.  The use of a RW will significantly improve the patient's ability to participate in MRADLS and the patient will use it on regular basis in the home.    History:     Past Medical History:   Diagnosis Date    Herpes genitalis in women 05/27/2025    Hypertension     Hypertensive heart disease without heart failure 02/04/2021    Intraductal papillary mucinous neoplasm of pancreas 03/04/2025    Pancreatitis     4 times (2009,2013)    UTI (urinary tract infection)        Past Surgical History:   Procedure Laterality Date    COLONOSCOPY N/A 8/22/2024    Procedure: COLONOSCOPY;  Surgeon: Kya Garcia MD;  Location: [facility];  Service: Endoscopy;  Laterality: N/A;    DILATION AND CURETTAGE OF UTERUS      ENDOSCOPIC ULTRASOUND OF UPPER GASTROINTESTINAL TRACT N/A 2/26/2025    Procedure: ULTRASOUND, UPPER GI TRACT, ENDOSCOPIC;  Surgeon: Abdi Mtz MD;  Location: [facility] ENDO;  Service: Endoscopy;  Laterality: N/A;  2/21 portal-EUS with anyone in next 2-3 weeks, either site should be ok. [email or web address]    MULTIPLE TOOTH EXTRACTIONS       vaginal delivery      varicose veins         Time Tracking:     PT Received On: 05/30/25  PT Start Time: 1130     PT Stop Time: 1200  PT Total Time (min): 30 min     Billable Minutes: Evaluation 15 and Therapeutic Activity 15    05/30/2025

## 2025-05-30 NOTE — ASSESSMENT & PLAN NOTE
POD #1- s/p open whipple.     Doing well.  As expected.  Awaiting return of bowel function.  NGT output bloody appearing but minimal and hgb is stable.  We will continue to monitor for worsening bleeding- if so, will need EGD.     -- NPO + IVF  -- NGT to LIWS  -- Morphine PCA for pain.  Holding home trazodone and flexeril.    -- starting reglan IV today   -- checking drain amylases today   -- encourage ambulation and IS  Dispo: continued ICU management    Lovenox (starting tomorrow assuming no bleeding) and SCDs for DVT ppx, Famotidine for GI ppx

## 2025-05-31 PROBLEM — R74.01 TRANSAMINITIS: Status: ACTIVE | Noted: 2025-05-31

## 2025-05-31 PROBLEM — R33.9 URINARY RETENTION: Status: ACTIVE | Noted: 2025-05-31

## 2025-05-31 LAB
ABSOLUTE EOSINOPHIL (OHS): 0.06 K/UL
ABSOLUTE MONOCYTE (OHS): 1.11 K/UL (ref 0.3–1)
ABSOLUTE NEUTROPHIL COUNT (OHS): 13.16 K/UL (ref 1.8–7.7)
ALBUMIN SERPL BCP-MCNC: 2.8 G/DL (ref 3.5–5.2)
ALP SERPL-CCNC: 43 UNIT/L (ref 40–150)
ALT SERPL W/O P-5'-P-CCNC: 172 UNIT/L (ref 10–44)
ANION GAP (OHS): 7 MMOL/L (ref 8–16)
AST SERPL-CCNC: 93 UNIT/L (ref 11–45)
BASOPHILS # BLD AUTO: 0.02 K/UL
BASOPHILS NFR BLD AUTO: 0.1 %
BILIRUB SERPL-MCNC: 0.7 MG/DL (ref 0.1–1)
BUN SERPL-MCNC: 23 MG/DL (ref 8–23)
CALCIUM SERPL-MCNC: 8.3 MG/DL (ref 8.7–10.5)
CHLORIDE SERPL-SCNC: 108 MMOL/L (ref 95–110)
CO2 SERPL-SCNC: 23 MMOL/L (ref 23–29)
CREAT SERPL-MCNC: 0.7 MG/DL (ref 0.5–1.4)
ERYTHROCYTE [DISTWIDTH] IN BLOOD BY AUTOMATED COUNT: 13.3 % (ref 11.5–14.5)
GFR SERPLBLD CREATININE-BSD FMLA CKD-EPI: >60 ML/MIN/1.73/M2
GLUCOSE SERPL-MCNC: 115 MG/DL (ref 70–110)
HCT VFR BLD AUTO: 33.8 % (ref 37–48.5)
HGB BLD-MCNC: 11 GM/DL (ref 12–16)
IMM GRANULOCYTES # BLD AUTO: 0.08 K/UL (ref 0–0.04)
IMM GRANULOCYTES NFR BLD AUTO: 0.5 % (ref 0–0.5)
LYMPHOCYTES # BLD AUTO: 1.39 K/UL (ref 1–4.8)
MAGNESIUM SERPL-MCNC: 2.3 MG/DL (ref 1.6–2.6)
MCH RBC QN AUTO: 30.1 PG (ref 27–31)
MCHC RBC AUTO-ENTMCNC: 32.5 G/DL (ref 32–36)
MCV RBC AUTO: 93 FL (ref 82–98)
NUCLEATED RBC (/100WBC) (OHS): 0 /100 WBC
PHOSPHATE SERPL-MCNC: 1.7 MG/DL (ref 2.7–4.5)
PLATELET # BLD AUTO: 173 K/UL (ref 150–450)
PMV BLD AUTO: 9.3 FL (ref 9.2–12.9)
POCT GLUCOSE: 106 MG/DL (ref 70–110)
POCT GLUCOSE: 112 MG/DL (ref 70–110)
POCT GLUCOSE: 120 MG/DL (ref 70–110)
POTASSIUM SERPL-SCNC: 4.2 MMOL/L (ref 3.5–5.1)
PROT SERPL-MCNC: 5.6 GM/DL (ref 6–8.4)
RBC # BLD AUTO: 3.65 M/UL (ref 4–5.4)
RELATIVE EOSINOPHIL (OHS): 0.4 %
RELATIVE LYMPHOCYTE (OHS): 8.8 % (ref 18–48)
RELATIVE MONOCYTE (OHS): 7 % (ref 4–15)
RELATIVE NEUTROPHIL (OHS): 83.2 % (ref 38–73)
SODIUM SERPL-SCNC: 138 MMOL/L (ref 136–145)
WBC # BLD AUTO: 15.82 K/UL (ref 3.9–12.7)

## 2025-05-31 PROCEDURE — S5010 5% DEXTROSE AND 0.45% SALINE: HCPCS | Performed by: SURGERY

## 2025-05-31 PROCEDURE — 27000221 HC OXYGEN, UP TO 24 HOURS

## 2025-05-31 PROCEDURE — 63600175 PHARM REV CODE 636 W HCPCS: Performed by: INTERNAL MEDICINE

## 2025-05-31 PROCEDURE — 25000003 PHARM REV CODE 250: Performed by: NURSE PRACTITIONER

## 2025-05-31 PROCEDURE — 02HV33Z INSERTION OF INFUSION DEVICE INTO SUPERIOR VENA CAVA, PERCUTANEOUS APPROACH: ICD-10-PCS | Performed by: SURGERY

## 2025-05-31 PROCEDURE — 99900035 HC TECH TIME PER 15 MIN (STAT)

## 2025-05-31 PROCEDURE — 63600175 PHARM REV CODE 636 W HCPCS: Performed by: NURSE PRACTITIONER

## 2025-05-31 PROCEDURE — 51798 US URINE CAPACITY MEASURE: CPT

## 2025-05-31 PROCEDURE — 80053 COMPREHEN METABOLIC PANEL: CPT | Performed by: SURGERY

## 2025-05-31 PROCEDURE — 63600175 PHARM REV CODE 636 W HCPCS: Performed by: SURGERY

## 2025-05-31 PROCEDURE — 85025 COMPLETE CBC W/AUTO DIFF WBC: CPT | Performed by: INTERNAL MEDICINE

## 2025-05-31 PROCEDURE — 20000000 HC ICU ROOM

## 2025-05-31 PROCEDURE — C1751 CATH, INF, PER/CENT/MIDLINE: HCPCS

## 2025-05-31 PROCEDURE — 84100 ASSAY OF PHOSPHORUS: CPT | Performed by: INTERNAL MEDICINE

## 2025-05-31 PROCEDURE — 94761 N-INVAS EAR/PLS OXIMETRY MLT: CPT

## 2025-05-31 PROCEDURE — 94799 UNLISTED PULMONARY SVC/PX: CPT

## 2025-05-31 PROCEDURE — 83735 ASSAY OF MAGNESIUM: CPT | Performed by: SURGERY

## 2025-05-31 PROCEDURE — 25000003 PHARM REV CODE 250: Performed by: SURGERY

## 2025-05-31 PROCEDURE — 36569 INSJ PICC 5 YR+ W/O IMAGING: CPT

## 2025-05-31 RX ORDER — ENOXAPARIN SODIUM 100 MG/ML
40 INJECTION SUBCUTANEOUS EVERY 24 HOURS
Status: DISCONTINUED | OUTPATIENT
Start: 2025-05-31 | End: 2025-06-04

## 2025-05-31 RX ORDER — ADHESIVE BANDAGE
30 BANDAGE TOPICAL 2 TIMES DAILY
Status: DISCONTINUED | OUTPATIENT
Start: 2025-05-31 | End: 2025-06-03

## 2025-05-31 RX ORDER — HALOPERIDOL LACTATE 5 MG/ML
5 INJECTION, SOLUTION INTRAMUSCULAR EVERY 6 HOURS PRN
Status: DISCONTINUED | OUTPATIENT
Start: 2025-05-31 | End: 2025-06-05 | Stop reason: HOSPADM

## 2025-05-31 RX ORDER — DEXTROSE MONOHYDRATE AND SODIUM CHLORIDE 5; .45 G/100ML; G/100ML
INJECTION, SOLUTION INTRAVENOUS CONTINUOUS
Status: DISCONTINUED | OUTPATIENT
Start: 2025-05-31 | End: 2025-06-02

## 2025-05-31 RX ORDER — SENNOSIDES 8.6 MG/1
8.6 TABLET ORAL 2 TIMES DAILY
Status: DISCONTINUED | OUTPATIENT
Start: 2025-05-31 | End: 2025-06-05 | Stop reason: HOSPADM

## 2025-05-31 RX ADMIN — CHLORHEXIDINE GLUCONATE 0.12% ORAL RINSE 10 ML: 1.2 LIQUID ORAL at 08:05

## 2025-05-31 RX ADMIN — SODIUM CHLORIDE, POTASSIUM CHLORIDE, SODIUM LACTATE AND CALCIUM CHLORIDE: 600; 310; 30; 20 INJECTION, SOLUTION INTRAVENOUS at 06:05

## 2025-05-31 RX ADMIN — PIPERACILLIN SODIUM AND TAZOBACTAM SODIUM 4.5 G: 4; .5 INJECTION, POWDER, FOR SOLUTION INTRAVENOUS at 11:05

## 2025-05-31 RX ADMIN — ENOXAPARIN SODIUM 40 MG: 40 INJECTION SUBCUTANEOUS at 05:05

## 2025-05-31 RX ADMIN — CHLORHEXIDINE GLUCONATE 0.12% ORAL RINSE 10 ML: 1.2 LIQUID ORAL at 09:05

## 2025-05-31 RX ADMIN — METOCLOPRAMIDE 10 MG: 5 INJECTION, SOLUTION INTRAMUSCULAR; INTRAVENOUS at 05:05

## 2025-05-31 RX ADMIN — MUPIROCIN: 20 OINTMENT TOPICAL at 09:05

## 2025-05-31 RX ADMIN — PIPERACILLIN SODIUM AND TAZOBACTAM SODIUM 4.5 G: 4; .5 INJECTION, POWDER, FOR SOLUTION INTRAVENOUS at 08:05

## 2025-05-31 RX ADMIN — FAMOTIDINE 20 MG: 10 INJECTION, SOLUTION INTRAVENOUS at 08:05

## 2025-05-31 RX ADMIN — MAGNESIUM HYDROXIDE 2400 MG: 400 SUSPENSION ORAL at 09:05

## 2025-05-31 RX ADMIN — HALOPERIDOL LACTATE 5 MG: 5 INJECTION, SOLUTION INTRAMUSCULAR at 10:05

## 2025-05-31 RX ADMIN — DEXTROSE AND SODIUM CHLORIDE: 5; 450 INJECTION, SOLUTION INTRAVENOUS at 09:05

## 2025-05-31 RX ADMIN — FAMOTIDINE 20 MG: 10 INJECTION, SOLUTION INTRAVENOUS at 09:05

## 2025-05-31 RX ADMIN — SENNOSIDES 8.6 MG: 8.6 TABLET, FILM COATED ORAL at 09:05

## 2025-05-31 RX ADMIN — MORPHINE SULFATE: 1 INJECTION INTRAVENOUS at 06:05

## 2025-05-31 RX ADMIN — PIPERACILLIN SODIUM AND TAZOBACTAM SODIUM 4.5 G: 4; .5 INJECTION, POWDER, FOR SOLUTION INTRAVENOUS at 02:05

## 2025-05-31 RX ADMIN — METOCLOPRAMIDE 10 MG: 5 INJECTION, SOLUTION INTRAMUSCULAR; INTRAVENOUS at 12:05

## 2025-05-31 RX ADMIN — MUPIROCIN: 20 OINTMENT TOPICAL at 08:05

## 2025-05-31 RX ADMIN — MORPHINE SULFATE: 1 INJECTION INTRAVENOUS at 02:05

## 2025-05-31 RX ADMIN — DEXTROSE AND SODIUM CHLORIDE: 5; 450 INJECTION, SOLUTION INTRAVENOUS at 11:05

## 2025-05-31 RX ADMIN — SODIUM PHOSPHATE, MONOBASIC, MONOHYDRATE AND SODIUM PHOSPHATE, DIBASIC, ANHYDROUS 20.1 MMOL: 142; 276 INJECTION, SOLUTION INTRAVENOUS at 05:05

## 2025-05-31 NOTE — EICU
Intervention Initiated From:  [facility] / EICU    Brook intervened regarding:  Rounding (Video assessment)    Virtual ICU Quality Rounds    Admit Date: 5/29/2025  Hospital Day: 2    ICU Day: 1d 13h    24H Vital Sign Range:  Temp:  [97.6 °F (36.4 °C)-98.3 °F (36.8 °C)]   Pulse:  []   Resp:  [10-25]   BP: ()/(55-89)   SpO2:  [93 %-100 %]   Arterial Line BP: (106-129)/(56-70)     VICU Surveillance Screening    Daily review of  line necessity(optional): Central line(s) in place    Central line type (optional): Triple lumen catheter            LDA reconciliation : Yes

## 2025-05-31 NOTE — ASSESSMENT & PLAN NOTE
Now post op Whipple procedure  Labs  Plan per surgery   IS   PT/OT     5/30 plan per surgery   Pain control   IS    5/31: IS/deep breathing, increase activity as tolerated. Pain control. Will discuss nutrition with surgical team.

## 2025-05-31 NOTE — PLAN OF CARE
AAOx4. SPO2 100% on 3 L NC. HR NSR-ST ranging 90s-low 100s and BP normotensive throughout the shift. Pt due to void at 2100. Bedside bladder scan performed with estimated urine of 269 mL. Pt stated she did not feel the urge to urinate. Encouraged pt to try to urinate with external catheter in place which was unsuccessful. In/out cath performed with 300 mL UOP. Due to void at 0500. Bladder scan performed at 0500 with estimated urine of 249 mL. Per bladder protocol reassess at 1100. Pain managed with PCA pump continued per MD orders see MAR for more details. R nare NGT in place connected to low intermittent wall suction with measured output of 0 mL. R abdominal KEVIN drains x 2 with total serosanguinous output of 385 mL. No BM during this shift. POC reviewed with patient and spouse at bedside. Bed in lowest position, side rails up x 3, wheels locked, call light within reach, and alarms on and audible.

## 2025-05-31 NOTE — ASSESSMENT & PLAN NOTE
In the post op setting, requiring straight cath  -Will continue to bladder scan and attempt voiding trials

## 2025-05-31 NOTE — SUBJECTIVE & OBJECTIVE
Interval History: Drowsy, wakes up easily. C/o fatigue, pain, being uncomfortable in the bed. Educated on importance of getting out of bed, sitting up in chair, taking deep breaths. Per patient, she is not passing gas yet.    Objective:     Vital Signs (Most Recent):  Temp: 97.7 °F (36.5 °C) (05/31/25 0705)  Pulse: 105 (05/31/25 0815)  Resp: 18 (05/31/25 0815)  BP: 124/70 (05/31/25 0735)  SpO2: 97 % (05/31/25 0815) Vital Signs (24h Range):  Temp:  [97.6 °F (36.4 °C)-98.3 °F (36.8 °C)] 97.7 °F (36.5 °C)  Pulse:  [] 105  Resp:  [10-25] 18  SpO2:  [93 %-100 %] 97 %  BP: ()/(55-89) 124/70  Arterial Line BP: (106-129)/(56-70) 129/70     Weight: 99 kg (218 lb 4.1 oz)  Body mass index is 33.19 kg/m².      Intake/Output Summary (Last 24 hours) at 5/31/2025 0943  Last data filed at 5/31/2025 0715  Gross per 24 hour   Intake 3441.61 ml   Output 1780 ml   Net 1661.61 ml        Physical Exam  Constitutional:       General: She is not in acute distress.     Appearance: She is obese. She is ill-appearing.   HENT:      Head: Normocephalic.      Nose: Nose normal.      Comments: NGT in place     Mouth/Throat:      Mouth: Mucous membranes are moist.   Eyes:      Pupils: Pupils are equal, round, and reactive to light.   Cardiovascular:      Rate and Rhythm: Normal rate.      Pulses: Normal pulses.      Heart sounds: Normal heart sounds.   Pulmonary:      Effort: Pulmonary effort is normal. No respiratory distress.      Breath sounds: Normal breath sounds.   Abdominal:      General: Bowel sounds are absent. There is no distension.      Palpations: Abdomen is soft.      Tenderness: There is abdominal tenderness.      Comments: Incision open to air, well approximated   Musculoskeletal:         General: No swelling.      Cervical back: Neck supple.   Skin:     General: Skin is warm and dry.   Neurological:      General: No focal deficit present.      Mental Status: She is alert and oriented to person, place, and time. Mental  status is at baseline.      Motor: Weakness present.           Review of Systems   Constitutional:  Positive for activity change, appetite change and fatigue.   Respiratory:  Negative for cough, choking, chest tightness, shortness of breath and wheezing.    Cardiovascular:  Negative for chest pain, palpitations and leg swelling.   Gastrointestinal:  Positive for abdominal pain. Negative for constipation, diarrhea, nausea and vomiting.        Incisional   Genitourinary:  Positive for difficulty urinating.   Neurological:  Positive for weakness.       Vents:  Oxygen Concentration (%): 98 (05/29/25 1800)    Lines/Drains/Airways       Central Venous Catheter Line  Duration             Percutaneous Central Line - Triple Lumen  05/29/25 0730 Internal Jugular Right 2 days              Drain  Duration                  Closed/Suction Drain 05/29/25 Tube - 1 Right RUQ Bulb 19 Fr. 2 days         Closed/Suction Drain 05/29/25 Tube - 2 Lateral;Right RUQ Bulb 19 Fr. 2 days         NG/OG Tube 05/29/25 1715 16 Fr. Right nostril 1 day              Peripheral Intravenous Line  Duration                  Peripheral IV - Single Lumen 05/29/25 0554 20 G 1 in No Anterior;Left Forearm 2 days                    Significant Labs:    CBC/Anemia Profile:  Recent Labs   Lab 05/30/25  0413 05/30/25  1746 05/31/25  0327   WBC 12.76* 15.90* 15.82*   HGB 12.4 11.9* 11.0*   HCT 36.2* 35.1* 33.8*    180 173   MCV 89 91 93   RDW 13.2 13.4 13.3        Chemistries:  Recent Labs   Lab 05/29/25  1803 05/30/25  0413 05/31/25  0327    139 138   K 3.9 3.5 4.2    112* 108   CO2 19* 17* 23   BUN 29* 24* 23   CREATININE 0.8 0.7 0.7   CALCIUM 8.4* 7.4* 8.3*   ALBUMIN 3.7 3.0* 2.8*   PROT 6.2 5.3* 5.6*   BILITOT 1.5* 0.9 0.7   ALKPHOS 53 45 43   * 241* 172*   * 146* 93*   MG  --  1.7 2.3   PHOS  --  3.3 1.7*       All pertinent labs within the past 24 hours have been reviewed.    Significant Imaging:  I have reviewed all pertinent  imaging results/findings within the past 24 hours.

## 2025-05-31 NOTE — PLAN OF CARE
Pt AAOx4. NSR/ST on monitor. BP stable. Afebrile. 2L O2 NC. NPO, ice chips okay. NG clamped at this time. Mckeon placed for retention. Good UOP. Abdominal incisions and KEVIN drains intact. Pt up to chair until afternoon. PICC placed, central line removed. Pt turned/repositioned with pillows and wedge. Heels floated. SCDs in place. Bed low, wheels locked, alarms audible. POC reviewed.

## 2025-05-31 NOTE — PROGRESS NOTES
[facility] ([facility])  General Surgery  Progress Note    Subjective:     History of Present Illness:  No notes on file    Post-Op Info:  Procedure(s) (LRB):  WHIPPLE PROCEDURE (N/A)   2 Days Post-Op     Interval History: AFVSS.  Straight cath x2 overnight.  No flatus yet.  KEVIN drains serosanguinous- KEVIN drain #2 more yellow/ marmalade appearing.  No flatus.      Medications:  Continuous Infusions:   D5 and 0.45% NaCl   Intravenous Continuous 75 mL/hr at 05/31/25 0941 New Bag at 05/31/25 0941    morphine   Intravenous Continuous   New Syringe/Bag at 05/31/25 0244     Scheduled Meds:   chlorhexidine  10 mL Mouth/Throat BID    famotidine (PF)  20 mg Intravenous BID    magnesium hydroxide 400 mg/5 ml  30 mL Oral BID    metoclopramide  10 mg Intravenous Q6H    mupirocin   Nasal BID    piperacillin-tazobactam (Zosyn) IV (PEDS and ADULTS) (extended infusion is not appropriate)  4.5 g Intravenous Q8H    senna  8.6 mg Oral BID     PRN Meds:  Current Facility-Administered Medications:     dextrose 50%, 12.5 g, Intravenous, PRN    glucagon (human recombinant), 1 mg, Intramuscular, PRN    insulin aspart U-100, 0-5 Units, Subcutaneous, Q6H PRN    magnesium sulfate 2 g IVPB, 2 g, Intravenous, PRN    magnesium sulfate 2 g IVPB, 2 g, Intravenous, PRN    naloxone, 0.02 mg, Intravenous, PRN    ondansetron, 4 mg, Intravenous, Q6H PRN    potassium chloride in water, 40 mEq, Intravenous, PRN **AND** potassium chloride in water, 60 mEq, Intravenous, PRN **AND** potassium chloride in water, 80 mEq, Intravenous, PRN     Review of patient's allergies indicates:   Allergen Reactions    Dilaudid [hydromorphone] Itching     From IV    Nitrofurantoin Other (See Comments)     Cramping of legs     Objective:     Vital Signs (Most Recent):  Temp: 97.7 °F (36.5 °C) (05/31/25 0705)  Pulse: 100 (05/31/25 1035)  Resp: 18 (05/31/25 1035)  BP: 130/75 (05/31/25 1035)  SpO2: 98 % (05/31/25 1035) Vital Signs (24h Range):  Temp:  [97.6 °F (36.4  °C)-98.3 °F (36.8 °C)] 97.7 °F (36.5 °C)  Pulse:  [] 100  Resp:  [10-25] 18  SpO2:  [93 %-100 %] 98 %  BP: ()/(56-89) 130/75  Arterial Line BP: (114-129)/(59-70) 129/70     Weight: 99 kg (218 lb 4.1 oz)  Body mass index is 33.19 kg/m².    Intake/Output - Last 3 Shifts         05/29 0700 05/30 0659 05/30 0700 05/31 0659 05/31 0700 06/01 0659    P.O.  560     I.V. (mL/kg) 925.6 (9.2) 2892.3 (29.2) 6 (0.1)    NG/GT  385 50    IV Piggyback 2642.4 689.2     Total Intake(mL/kg) 3568 (35.5) 4526.5 (45.7) 56 (0.6)    Urine (mL/kg/hr) 1009 (0.4) 450 (0.2)     Drains 375 1245 85    Total Output 1384 1695 85    Net +2184 +2831.5 -29                    Physical Exam  Constitutional:       General: She is not in acute distress.     Appearance: Normal appearance.   HENT:      Head: Normocephalic and atraumatic.      Nose:      Comments: NGT in place- bloody/ bilious output   Eyes:      Extraocular Movements: Extraocular movements intact.      Conjunctiva/sclera: Conjunctivae normal.   Cardiovascular:      Rate and Rhythm: Normal rate and regular rhythm.   Pulmonary:      Effort: Pulmonary effort is normal.   Abdominal:      General: Abdomen is flat. There is distension.      Palpations: Abdomen is soft. There is no mass.      Tenderness: There is no guarding or rebound.      Hernia: No hernia is present.      Comments: Appropriately TTP, incision c/d/I- with bruising, KEVIN drains serosanguinous - drain #2 more marmalade appearing    Musculoskeletal:         General: No swelling, tenderness, deformity or signs of injury. Normal range of motion.   Skin:     General: Skin is warm and dry.      Coloration: Skin is not jaundiced.   Neurological:      General: No focal deficit present.      Mental Status: She is alert and oriented to person, place, and time.   Psychiatric:         Mood and Affect: Mood normal.         Behavior: Behavior normal.         Thought Content: Thought content normal.          Significant Labs:  I  have reviewed all pertinent lab results within the past 24 hours.  CBC:   Recent Labs   Lab 05/31/25 0327   WBC 15.82*   RBC 3.65*   HGB 11.0*   HCT 33.8*      MCV 93   MCH 30.1   MCHC 32.5     BMP:   Recent Labs   Lab 05/31/25  0327   *      K 4.2      CO2 23   BUN 23   CREATININE 0.7   CALCIUM 8.3*   MG 2.3       Significant Diagnostics:  I have reviewed all pertinent imaging results/findings within the past 24 hours.  Assessment/Plan:     * Intraductal papillary mucinous neoplasm of pancreas  POD #2- s/p open whipple.     Doing well.  As expected.  Awaiting return of bowel function.  NGT output bilious, non bloody today.      -- NGT to gravity trial today  -- changing IVF to D5 0.45 today, strict I/Os  -- anand replaced  -- continue Zosyn while panc leak   -- Morphine PCA for pain.  Holding home trazodone and flexeril.    -- continue reglan IV   -- starting BID senna and MOM today   -- checking drain amylases tomorrow    -- encourage ambulation and IS  Dispo: continued ICU management    Lovenox and SCDs for DVT ppx, Famotidine for GI ppx        Urinary retention  Anand replaced today     Hypertension  -- holding home Diovan-HCT  -- appreciate ICU assistance with management        Yolie Lieberman MD  General Surgery  [facility] ([facility])

## 2025-05-31 NOTE — ASSESSMENT & PLAN NOTE
POD #3- s/p open whipple.     Doing ok.  No nausea/ vomiting, tolerating CLD.       -- decrease IVF  -- continue anand for now, will attempt removal again tomorrow   -- continue Zosyn while panc leak   -- Morphine PCA for pain.  Holding home trazodone and flexeril.    -- continue reglan IV   -- starting BID senna and MOM today   -- checking drain amylases today    -- encourage ambulation and IS  Dispo: ok for floor today with tele    Lovenox and SCDs for DVT ppx, Famotidine for GI ppx

## 2025-05-31 NOTE — PROCEDURES
"Chel Thompson is a 64 y.o. female patient.    Temp: 97.6 °F (36.4 °C) (05/31/25 1105)  Pulse: 101 (05/31/25 1140)  Resp: (!) 22 (05/31/25 1140)  BP: 117/65 (05/31/25 1148)  SpO2: 98 % (05/31/25 1140)  Weight: 99 kg (218 lb 4.1 oz) (05/31/25 0600)  Height: 5' 8" (172.7 cm) (05/29/25 0544)    PICC  Date/Time: 5/31/2025 1:24 PM  Performed by: Leon Strauss RN  Consent Done: Yes  Time out: Immediately prior to procedure a time out was called to verify the correct patient, procedure, equipment, support staff and site/side marked as required  Indications: med administration and vascular access  Anesthesia: local infiltration  Local anesthetic: lidocaine 1% without epinephrine  Anesthetic Total (mL): 3  Preparation: skin prepped with ChloraPrep  Skin prep agent dried: skin prep agent completely dried prior to procedure  Sterile barriers: all five maximum sterile barriers used - cap, mask, sterile gown, sterile gloves, and large sterile sheet  Hand hygiene: hand hygiene performed prior to central venous catheter insertion  Location details: right basilic  Catheter type: double lumen  Catheter size: 4 Fr  Catheter Length: 38cm    Ultrasound guidance: yes  Vessel Caliber: medium and patent, compressibility normal  Vascular Doppler: not done  Needle advanced into vessel with real time Ultrasound guidance.  Guidewire confirmed in vessel.  Sterile sheath used.  no esophageal manometryNumber of attempts: 1  Post-procedure: blood return through all ports, chlorhexidine patch and sterile dressing applied  Estimated blood loss (mL): 0    Assessment: placement verified by x-ray  Complications: none          Name Leon Strauss RN  5/31/2025    "

## 2025-05-31 NOTE — EICU
[facility] Night Rounds Checklist  24H Vital Sign Range:  Temp:  [97.6 °F (36.4 °C)-99.1 °F (37.3 °C)]   Pulse:  []   Resp:  [10-25]   BP: ()/(55-91)   SpO2:  [93 %-100 %]   Arterial Line BP: ()/(53-83)     Video rounds

## 2025-05-31 NOTE — PROGRESS NOTES
[facility]  Critical Care Medicine  Progress Note    Patient Name: Chel Thompson  MRN: [id number]  Admission Date: 5/29/2025  Hospital Length of Stay: 2 days  Code Status: Full Code  Attending Provider: Yolie Lieberman MD  Primary Care Provider: Yesica Burk MD   Principal Problem: Intraductal papillary mucinous neoplasm of pancreas    Subjective:     HPI:  Ms. Thompson is a 64-year-old female history of hypertension who presented for a Whipple procedure for IPMN.  Patient has a history of having multiple episodes of pancreatitis.  Patient had an MRI of her spine for pain but also showed multiocular cystic mass at the head of the pancreas.  Patient then had a MRI MRCP of the abdomen in November.  This showed dilation of the main pancreatic duct.  Patient is now in PACU status post procedure.  ICU consulted for ICU management and monitoring postop.  Patient is currently drowsy and unable to provide a history.  Vital signs are stable.  She does have 2 KEVIN drains.    Hospital/ICU Course:  5/30 no acute events  No complaints   05/31/2025: Continued pain, using PCA. Up to chair this morning. Encouraging IS/ambulation. Urinary retention requiring straight cath overnight. Cont ICU care.    Interval History: Drowsy, wakes up easily. C/o fatigue, pain, being uncomfortable in the bed. Educated on importance of getting out of bed, sitting up in chair, taking deep breaths. Per patient, she is not passing gas yet.    Objective:     Vital Signs (Most Recent):  Temp: 97.7 °F (36.5 °C) (05/31/25 0705)  Pulse: 105 (05/31/25 0815)  Resp: 18 (05/31/25 0815)  BP: 124/70 (05/31/25 0735)  SpO2: 97 % (05/31/25 0815) Vital Signs (24h Range):  Temp:  [97.6 °F (36.4 °C)-98.3 °F (36.8 °C)] 97.7 °F (36.5 °C)  Pulse:  [] 105  Resp:  [10-25] 18  SpO2:  [93 %-100 %] 97 %  BP: ()/(55-89) 124/70  Arterial Line BP: (106-129)/(56-70) 129/70     Weight: 99 kg (218 lb 4.1 oz)  Body mass index is 33.19  kg/m².      Intake/Output Summary (Last 24 hours) at 5/31/2025 0943  Last data filed at 5/31/2025 0715  Gross per 24 hour   Intake 3441.61 ml   Output 1780 ml   Net 1661.61 ml        Physical Exam  Constitutional:       General: She is not in acute distress.     Appearance: She is obese. She is ill-appearing.   HENT:      Head: Normocephalic.      Nose: Nose normal.      Comments: NGT in place     Mouth/Throat:      Mouth: Mucous membranes are moist.   Eyes:      Pupils: Pupils are equal, round, and reactive to light.   Cardiovascular:      Rate and Rhythm: Normal rate.      Pulses: Normal pulses.      Heart sounds: Normal heart sounds.   Pulmonary:      Effort: Pulmonary effort is normal. No respiratory distress.      Breath sounds: Normal breath sounds.   Abdominal:      General: Bowel sounds are absent. There is no distension.      Palpations: Abdomen is soft.      Tenderness: There is abdominal tenderness.      Comments: Incision open to air, well approximated   Musculoskeletal:         General: No swelling.      Cervical back: Neck supple.   Skin:     General: Skin is warm and dry.   Neurological:      General: No focal deficit present.      Mental Status: She is alert and oriented to person, place, and time. Mental status is at baseline.      Motor: Weakness present.           Review of Systems   Constitutional:  Positive for activity change, appetite change and fatigue.   Respiratory:  Negative for cough, choking, chest tightness, shortness of breath and wheezing.    Cardiovascular:  Negative for chest pain, palpitations and leg swelling.   Gastrointestinal:  Positive for abdominal pain. Negative for constipation, diarrhea, nausea and vomiting.        Incisional   Genitourinary:  Positive for difficulty urinating.   Neurological:  Positive for weakness.       Vents:  Oxygen Concentration (%): 98 (05/29/25 1800)    Lines/Drains/Airways       Central Venous Catheter Line  Duration             Percutaneous  "Central Line - Triple Lumen  05/29/25 0730 Internal Jugular Right 2 days              Drain  Duration                  Closed/Suction Drain 05/29/25 Tube - 1 Right RUQ Bulb 19 Fr. 2 days         Closed/Suction Drain 05/29/25 Tube - 2 Lateral;Right RUQ Bulb 19 Fr. 2 days         NG/OG Tube 05/29/25 1715 16 Fr. Right nostril 1 day              Peripheral Intravenous Line  Duration                  Peripheral IV - Single Lumen 05/29/25 0554 20 G 1 in No Anterior;Left Forearm 2 days                    Significant Labs:    CBC/Anemia Profile:  Recent Labs   Lab 05/30/25  0413 05/30/25  1746 05/31/25  0327   WBC 12.76* 15.90* 15.82*   HGB 12.4 11.9* 11.0*   HCT 36.2* 35.1* 33.8*    180 173   MCV 89 91 93   RDW 13.2 13.4 13.3        Chemistries:  Recent Labs   Lab 05/29/25  1803 05/30/25  0413 05/31/25  0327    139 138   K 3.9 3.5 4.2    112* 108   CO2 19* 17* 23   BUN 29* 24* 23   CREATININE 0.8 0.7 0.7   CALCIUM 8.4* 7.4* 8.3*   ALBUMIN 3.7 3.0* 2.8*   PROT 6.2 5.3* 5.6*   BILITOT 1.5* 0.9 0.7   ALKPHOS 53 45 43   * 241* 172*   * 146* 93*   MG  --  1.7 2.3   PHOS  --  3.3 1.7*       All pertinent labs within the past 24 hours have been reviewed.    Significant Imaging:  I have reviewed all pertinent imaging results/findings within the past 24 hours.    ABG  No results for input(s): "PH", "PO2", "PCO2", "HCO3", "BE" in the last 168 hours.  Assessment/Plan:     Assessment & Plan  Intraductal papillary mucinous neoplasm of pancreas  Now post op Whipple procedure  Labs  Plan per surgery   IS   PT/OT     5/30 plan per surgery   Pain control   IS    5/31: IS/deep breathing, increase activity as tolerated. Pain control. Will discuss nutrition with surgical team.  Hypertension  IV prn's if needed  H/O acute pancreatitis  Monitor   Urinary retention  In the post op setting, requiring straight cath  -Will continue to bladder scan and attempt voiding trials  Transaminitis  In the post op setting, " trending down, monitor daily    Critical Care Time: 35 minutes  Critical secondary to Patient has a condition that poses threat to life and bodily function: s/p Nakul      Critical care was time spent personally by me on the following activities: development of treatment plan with patient or surrogate and bedside caregivers, discussions with consultants, evaluation of patient's response to treatment, examination of patient, ordering and performing treatments and interventions, ordering and review of laboratory studies, ordering and review of radiographic studies, pulse oximetry, re-evaluation of patient's condition. This critical care time did not overlap with that of any other provider or involve time for any procedures.     Sophia Lyle NP  Critical Care Medicine  [facility] ([facility])

## 2025-05-31 NOTE — SUBJECTIVE & OBJECTIVE
Interval History: AFVSS.  Straight cath x2 overnight.  No flatus yet.  KEVIN drains serosanguinous- KEVIN drain #2 more yellow/ marmalade appearing.  No flatus.      Medications:  Continuous Infusions:   D5 and 0.45% NaCl   Intravenous Continuous 75 mL/hr at 05/31/25 0941 New Bag at 05/31/25 0941    morphine   Intravenous Continuous   New Syringe/Bag at 05/31/25 0244     Scheduled Meds:   chlorhexidine  10 mL Mouth/Throat BID    famotidine (PF)  20 mg Intravenous BID    magnesium hydroxide 400 mg/5 ml  30 mL Oral BID    metoclopramide  10 mg Intravenous Q6H    mupirocin   Nasal BID    piperacillin-tazobactam (Zosyn) IV (PEDS and ADULTS) (extended infusion is not appropriate)  4.5 g Intravenous Q8H    senna  8.6 mg Oral BID     PRN Meds:  Current Facility-Administered Medications:     dextrose 50%, 12.5 g, Intravenous, PRN    glucagon (human recombinant), 1 mg, Intramuscular, PRN    insulin aspart U-100, 0-5 Units, Subcutaneous, Q6H PRN    magnesium sulfate 2 g IVPB, 2 g, Intravenous, PRN    magnesium sulfate 2 g IVPB, 2 g, Intravenous, PRN    naloxone, 0.02 mg, Intravenous, PRN    ondansetron, 4 mg, Intravenous, Q6H PRN    potassium chloride in water, 40 mEq, Intravenous, PRN **AND** potassium chloride in water, 60 mEq, Intravenous, PRN **AND** potassium chloride in water, 80 mEq, Intravenous, PRN     Review of patient's allergies indicates:   Allergen Reactions    Dilaudid [hydromorphone] Itching     From IV    Nitrofurantoin Other (See Comments)     Cramping of legs     Objective:     Vital Signs (Most Recent):  Temp: 97.7 °F (36.5 °C) (05/31/25 0705)  Pulse: 100 (05/31/25 1035)  Resp: 18 (05/31/25 1035)  BP: 130/75 (05/31/25 1035)  SpO2: 98 % (05/31/25 1035) Vital Signs (24h Range):  Temp:  [97.6 °F (36.4 °C)-98.3 °F (36.8 °C)] 97.7 °F (36.5 °C)  Pulse:  [] 100  Resp:  [10-25] 18  SpO2:  [93 %-100 %] 98 %  BP: ()/(56-89) 130/75  Arterial Line BP: (114-129)/(59-70) 129/70     Weight: 99 kg (218 lb 4.1  oz)  Body mass index is 33.19 kg/m².    Intake/Output - Last 3 Shifts         05/29 0700  05/30 0659 05/30 0700 05/31 0659 05/31 0700 06/01 0659    P.O.  560     I.V. (mL/kg) 925.6 (9.2) 2892.3 (29.2) 6 (0.1)    NG/GT  385 50    IV Piggyback 2642.4 689.2     Total Intake(mL/kg) 3568 (35.5) 4526.5 (45.7) 56 (0.6)    Urine (mL/kg/hr) 1009 (0.4) 450 (0.2)     Drains 375 1245 85    Total Output 1384 1695 85    Net +2184 +2831.5 -29                    Physical Exam  Constitutional:       General: She is not in acute distress.     Appearance: Normal appearance.   HENT:      Head: Normocephalic and atraumatic.      Nose:      Comments: NGT in place- bloody/ bilious output   Eyes:      Extraocular Movements: Extraocular movements intact.      Conjunctiva/sclera: Conjunctivae normal.   Cardiovascular:      Rate and Rhythm: Normal rate and regular rhythm.   Pulmonary:      Effort: Pulmonary effort is normal.   Abdominal:      General: Abdomen is flat. There is distension.      Palpations: Abdomen is soft. There is no mass.      Tenderness: There is no guarding or rebound.      Hernia: No hernia is present.      Comments: Appropriately TTP, incision c/d/I- with bruising, KEVIN drains serosanguinous - drain #2 more marmalade appearing    Musculoskeletal:         General: No swelling, tenderness, deformity or signs of injury. Normal range of motion.   Skin:     General: Skin is warm and dry.      Coloration: Skin is not jaundiced.   Neurological:      General: No focal deficit present.      Mental Status: She is alert and oriented to person, place, and time.   Psychiatric:         Mood and Affect: Mood normal.         Behavior: Behavior normal.         Thought Content: Thought content normal.          Significant Labs:  I have reviewed all pertinent lab results within the past 24 hours.  CBC:   Recent Labs   Lab 05/31/25  0327   WBC 15.82*   RBC 3.65*   HGB 11.0*   HCT 33.8*      MCV 93   MCH 30.1   MCHC 32.5     BMP:    Recent Labs   Lab 05/31/25  0327   *      K 4.2      CO2 23   BUN 23   CREATININE 0.7   CALCIUM 8.3*   MG 2.3       Significant Diagnostics:  I have reviewed all pertinent imaging results/findings within the past 24 hours.

## 2025-05-31 NOTE — ASSESSMENT & PLAN NOTE
POD #2- s/p open whipple.     Doing well.  As expected.  Awaiting return of bowel function.  NGT output bilious, non bloody today.      -- NGT to gravity trial today  -- changing IVF to D5 0.45 today, strict I/Os  -- anand replaced  -- Morphine PCA for pain.  Holding home trazodone and flexeril.    -- continue reglan IV   -- starting BID senna and MOM today   -- checking drain amylases tomorrow    -- encourage ambulation and IS  Dispo: continued ICU management    Lovenox and SCDs for DVT ppx, Famotidine for GI ppx

## 2025-06-01 LAB
ALBUMIN SERPL BCP-MCNC: 2.3 G/DL (ref 3.5–5.2)
ALP SERPL-CCNC: 45 UNIT/L (ref 40–150)
ALT SERPL W/O P-5'-P-CCNC: 107 UNIT/L (ref 10–44)
AMYLASE FLD-CCNC: 258 U/L
AMYLASE FLD-CCNC: 875 U/L
ANION GAP (OHS): 6 MMOL/L (ref 8–16)
AST SERPL-CCNC: 55 UNIT/L (ref 11–45)
BILIRUB SERPL-MCNC: 0.8 MG/DL (ref 0.1–1)
BUN SERPL-MCNC: 13 MG/DL (ref 8–23)
CALCIUM SERPL-MCNC: 8.2 MG/DL (ref 8.7–10.5)
CHLORIDE SERPL-SCNC: 103 MMOL/L (ref 95–110)
CO2 SERPL-SCNC: 24 MMOL/L (ref 23–29)
CREAT SERPL-MCNC: 0.6 MG/DL (ref 0.5–1.4)
ERYTHROCYTE [DISTWIDTH] IN BLOOD BY AUTOMATED COUNT: 13.2 % (ref 11.5–14.5)
GFR SERPLBLD CREATININE-BSD FMLA CKD-EPI: >60 ML/MIN/1.73/M2
GLUCOSE SERPL-MCNC: 119 MG/DL (ref 70–110)
HCT VFR BLD AUTO: 29.4 % (ref 37–48.5)
HGB BLD-MCNC: 9.7 GM/DL (ref 12–16)
MAGNESIUM SERPL-MCNC: 2.2 MG/DL (ref 1.6–2.6)
MCH RBC QN AUTO: 29.9 PG (ref 27–31)
MCHC RBC AUTO-ENTMCNC: 33 G/DL (ref 32–36)
MCV RBC AUTO: 91 FL (ref 82–98)
PLATELET # BLD AUTO: 143 K/UL (ref 150–450)
PMV BLD AUTO: 8.8 FL (ref 9.2–12.9)
POCT GLUCOSE: 106 MG/DL (ref 70–110)
POCT GLUCOSE: 116 MG/DL (ref 70–110)
POCT GLUCOSE: 120 MG/DL (ref 70–110)
POTASSIUM SERPL-SCNC: 3.5 MMOL/L (ref 3.5–5.1)
PROT SERPL-MCNC: 5.5 GM/DL (ref 6–8.4)
RBC # BLD AUTO: 3.24 M/UL (ref 4–5.4)
SODIUM SERPL-SCNC: 133 MMOL/L (ref 136–145)
WBC # BLD AUTO: 12.07 K/UL (ref 3.9–12.7)

## 2025-06-01 PROCEDURE — 21400001 HC TELEMETRY ROOM

## 2025-06-01 PROCEDURE — 63600175 PHARM REV CODE 636 W HCPCS: Performed by: NURSE PRACTITIONER

## 2025-06-01 PROCEDURE — 25000003 PHARM REV CODE 250: Performed by: NURSE PRACTITIONER

## 2025-06-01 PROCEDURE — 63600175 PHARM REV CODE 636 W HCPCS: Performed by: SURGERY

## 2025-06-01 PROCEDURE — 97530 THERAPEUTIC ACTIVITIES: CPT | Mod: CQ

## 2025-06-01 PROCEDURE — 85027 COMPLETE CBC AUTOMATED: CPT | Performed by: SURGERY

## 2025-06-01 PROCEDURE — 99900035 HC TECH TIME PER 15 MIN (STAT)

## 2025-06-01 PROCEDURE — 94799 UNLISTED PULMONARY SVC/PX: CPT

## 2025-06-01 PROCEDURE — 94761 N-INVAS EAR/PLS OXIMETRY MLT: CPT

## 2025-06-01 PROCEDURE — 27000221 HC OXYGEN, UP TO 24 HOURS

## 2025-06-01 PROCEDURE — 97116 GAIT TRAINING THERAPY: CPT | Mod: CQ

## 2025-06-01 PROCEDURE — 25000003 PHARM REV CODE 250: Performed by: SURGERY

## 2025-06-01 PROCEDURE — 83735 ASSAY OF MAGNESIUM: CPT | Performed by: SURGERY

## 2025-06-01 PROCEDURE — 80053 COMPREHEN METABOLIC PANEL: CPT | Performed by: SURGERY

## 2025-06-01 PROCEDURE — 82150 ASSAY OF AMYLASE: CPT | Performed by: SURGERY

## 2025-06-01 RX ORDER — BISACODYL 10 MG/1
10 SUPPOSITORY RECTAL ONCE
Status: COMPLETED | OUTPATIENT
Start: 2025-06-01 | End: 2025-06-01

## 2025-06-01 RX ADMIN — CHLORHEXIDINE GLUCONATE 0.12% ORAL RINSE 10 ML: 1.2 LIQUID ORAL at 09:06

## 2025-06-01 RX ADMIN — SENNOSIDES 8.6 MG: 8.6 TABLET, FILM COATED ORAL at 08:06

## 2025-06-01 RX ADMIN — BISACODYL 10 MG: 10 SUPPOSITORY RECTAL at 01:06

## 2025-06-01 RX ADMIN — ONDANSETRON 4 MG: 2 INJECTION INTRAMUSCULAR; INTRAVENOUS at 12:06

## 2025-06-01 RX ADMIN — METOCLOPRAMIDE 10 MG: 5 INJECTION, SOLUTION INTRAMUSCULAR; INTRAVENOUS at 11:06

## 2025-06-01 RX ADMIN — FAMOTIDINE 20 MG: 10 INJECTION, SOLUTION INTRAVENOUS at 08:06

## 2025-06-01 RX ADMIN — METOCLOPRAMIDE 10 MG: 5 INJECTION, SOLUTION INTRAMUSCULAR; INTRAVENOUS at 06:06

## 2025-06-01 RX ADMIN — MUPIROCIN: 20 OINTMENT TOPICAL at 09:06

## 2025-06-01 RX ADMIN — METOCLOPRAMIDE 10 MG: 5 INJECTION, SOLUTION INTRAMUSCULAR; INTRAVENOUS at 01:06

## 2025-06-01 RX ADMIN — PIPERACILLIN SODIUM AND TAZOBACTAM SODIUM 4.5 G: 4; .5 INJECTION, POWDER, FOR SOLUTION INTRAVENOUS at 04:06

## 2025-06-01 RX ADMIN — SENNOSIDES 8.6 MG: 8.6 TABLET, FILM COATED ORAL at 09:06

## 2025-06-01 RX ADMIN — PIPERACILLIN SODIUM AND TAZOBACTAM SODIUM 4.5 G: 4; .5 INJECTION, POWDER, FOR SOLUTION INTRAVENOUS at 11:06

## 2025-06-01 RX ADMIN — MUPIROCIN: 20 OINTMENT TOPICAL at 08:06

## 2025-06-01 RX ADMIN — MORPHINE SULFATE: 1 INJECTION INTRAVENOUS at 08:06

## 2025-06-01 RX ADMIN — PROMETHAZINE HYDROCHLORIDE 6.25 MG: 25 INJECTION INTRAMUSCULAR; INTRAVENOUS at 01:06

## 2025-06-01 RX ADMIN — PIPERACILLIN SODIUM AND TAZOBACTAM SODIUM 4.5 G: 4; .5 INJECTION, POWDER, FOR SOLUTION INTRAVENOUS at 08:06

## 2025-06-01 RX ADMIN — MAGNESIUM HYDROXIDE 2400 MG: 400 SUSPENSION ORAL at 08:06

## 2025-06-01 RX ADMIN — FAMOTIDINE 20 MG: 10 INJECTION, SOLUTION INTRAVENOUS at 09:06

## 2025-06-01 RX ADMIN — POTASSIUM CHLORIDE 40 MEQ: 29.8 INJECTION, SOLUTION INTRAVENOUS at 05:06

## 2025-06-01 RX ADMIN — METOCLOPRAMIDE 10 MG: 5 INJECTION, SOLUTION INTRAMUSCULAR; INTRAVENOUS at 05:06

## 2025-06-01 RX ADMIN — METOCLOPRAMIDE 10 MG: 5 INJECTION, SOLUTION INTRAMUSCULAR; INTRAVENOUS at 12:06

## 2025-06-01 RX ADMIN — CHLORHEXIDINE GLUCONATE 0.12% ORAL RINSE 10 ML: 1.2 LIQUID ORAL at 08:06

## 2025-06-01 RX ADMIN — ENOXAPARIN SODIUM 40 MG: 40 INJECTION SUBCUTANEOUS at 04:06

## 2025-06-01 RX ADMIN — MAGNESIUM HYDROXIDE 2400 MG: 400 SUSPENSION ORAL at 09:06

## 2025-06-01 NOTE — PROGRESS NOTES
[facility] - [facility] Care ([facility])  General Surgery  Progress Note    Subjective:     History of Present Illness:  No notes on file    Post-Op Info:  Procedure(s) (LRB):  WHIPPLE PROCEDURE (N/A)   3 Days Post-Op     Interval History: AFVSS.  KELLEY.  Cavity trial yesterday and NG tube were removed started on sips of clear liquids which he is tolerating well without nausea or vomiting.  Still no flatus.  Only pulling 750 on incentive spirometer.    Medications:  Continuous Infusions:   D5 and 0.45% NaCl   Intravenous Continuous 20 mL/hr at 06/01/25 1200 Rate Verify at 06/01/25 1200    morphine   Intravenous Continuous   New Syringe/Bag at 06/01/25 0803     Scheduled Meds:   chlorhexidine  10 mL Mouth/Throat BID    enoxparin  40 mg Subcutaneous Q24H (prophylaxis, 1700)    famotidine (PF)  20 mg Intravenous BID    magnesium hydroxide 400 mg/5 ml  30 mL Oral BID    metoclopramide  10 mg Intravenous Q6H    mupirocin   Nasal BID    piperacillin-tazobactam (Zosyn) IV (PEDS and ADULTS) (extended infusion is not appropriate)  4.5 g Intravenous Q8H    senna  8.6 mg Oral BID     PRN Meds:  Current Facility-Administered Medications:     dextrose 50%, 12.5 g, Intravenous, PRN    glucagon (human recombinant), 1 mg, Intramuscular, PRN    haloperidol lactate, 5 mg, Intravenous, Q6H PRN    insulin aspart U-100, 0-5 Units, Subcutaneous, Q6H PRN    magnesium sulfate 2 g IVPB, 2 g, Intravenous, PRN    magnesium sulfate 2 g IVPB, 2 g, Intravenous, PRN    naloxone, 0.02 mg, Intravenous, PRN    ondansetron, 4 mg, Intravenous, Q6H PRN    potassium chloride in water, 40 mEq, Intravenous, PRN **AND** potassium chloride in water, 60 mEq, Intravenous, PRN **AND** potassium chloride in water, 80 mEq, Intravenous, PRN     Review of patient's allergies indicates:   Allergen Reactions    Dilaudid [hydromorphone] Itching     From IV    Nitrofurantoin Other (See Comments)     Cramping of legs     Objective:     Vital Signs (Most Recent):  Temp: 98.1 °F  (36.7 °C) (06/01/25 1105)  Pulse: (!) 111 (06/01/25 1209)  Resp: (!) 26 (06/01/25 1209)  BP: (!) 138/91 (06/01/25 1205)  SpO2: 99 % (06/01/25 1209) Vital Signs (24h Range):  Temp:  [97.5 °F (36.4 °C)-98.6 °F (37 °C)] 98.1 °F (36.7 °C)  Pulse:  [] 111  Resp:  [15-26] 26  SpO2:  [95 %-100 %] 99 %  BP: (109-146)/(61-91) 138/91     Weight: 101.2 kg (223 lb 1.7 oz)  Body mass index is 33.92 kg/m².    Intake/Output - Last 3 Shifts         05/30 0700  05/31 0659 05/31 0700  06/01 0659 06/01 0700  06/02 0659    P.O. 560  240    I.V. (mL/kg) 2892.3 (29.2) 1821.3 (18) 293.6 (2.9)    NG/ 50     IV Piggyback 689.2 504.7 145.4    Total Intake(mL/kg) 4526.5 (45.7) 2376 (23.5) 679.1 (6.7)    Urine (mL/kg/hr) 450 (0.2) 1607 (0.7) 100 (0.2)    Drains 1245 480     Total Output 1695 2087 100    Net +2831.5 +289 +579.1                    Physical Exam  Vitals and nursing note reviewed.   Constitutional:       General: She is not in acute distress.     Appearance: Normal appearance.   HENT:      Head: Normocephalic and atraumatic.      Nose:      Comments: NGT in place- bloody/ bilious output   Eyes:      Extraocular Movements: Extraocular movements intact.      Conjunctiva/sclera: Conjunctivae normal.   Cardiovascular:      Rate and Rhythm: Normal rate and regular rhythm.   Pulmonary:      Effort: Pulmonary effort is normal.   Abdominal:      General: Abdomen is flat. There is no distension.      Palpations: Abdomen is soft. There is no mass.      Tenderness: There is no guarding or rebound.      Hernia: No hernia is present.      Comments: Appropriately TTP, incision c/d/I- with bruising, KEVIN drains serosanguinous - drain #2 more marmalade appearing    Musculoskeletal:         General: No swelling, tenderness, deformity or signs of injury. Normal range of motion.   Skin:     General: Skin is warm and dry.      Coloration: Skin is not jaundiced.   Neurological:      General: No focal deficit present.      Mental Status: She  is alert and oriented to person, place, and time.   Psychiatric:         Mood and Affect: Mood normal.         Behavior: Behavior normal.         Thought Content: Thought content normal.          Significant Labs:  I have reviewed all pertinent lab results within the past 24 hours.  CBC:   Recent Labs   Lab 06/01/25  0412   WBC 12.07   RBC 3.24*   HGB 9.7*   HCT 29.4*   *   MCV 91   MCH 29.9   MCHC 33.0     BMP:   Recent Labs   Lab 06/01/25  0412   *   *   K 3.5      CO2 24   BUN 13   CREATININE 0.6   CALCIUM 8.2*   MG 2.2       Significant Diagnostics:  I have reviewed all pertinent imaging results/findings within the past 24 hours.  Assessment/Plan:     * Intraductal papillary mucinous neoplasm of pancreas  POD #3- s/p open whipple.     Doing ok.  No nausea/ vomiting, tolerating CLD.       -- decrease IVF  -- continue anand for now, will attempt removal again tomorrow   -- continue Zosyn while panc leak   -- Morphine PCA for pain.  Holding home trazodone and flexeril.    -- continue reglan IV   -- starting BID senna and MOM today   -- checking drain amylases today    -- encourage ambulation and IS  Dispo: ok for floor today with tele    Lovenox and SCDs for DVT ppx, Famotidine for GI ppx        Urinary retention  Continue anand.  Likely 2/2 opioids.  Will attempt removal again tomorrow.     Hypertension  -- holding home Diovan-HCT  -- appreciate ICU assistance with management        Yolie Lieberman MD  General Surgery  [facility] - Intensive Care ([facility])

## 2025-06-01 NOTE — EICU
Intervention Initiated From:  [facility] / EICU    Brook intervened regarding:  Rounding (Video assessment)    Virtual ICU Quality Rounds    Admit Date: 5/29/2025  Hospital Day: 3    ICU Day: 2d 15h    24H Vital Sign Range:  Temp:  [97.5 °F (36.4 °C)-98.6 °F (37 °C)]   Pulse:  []   Resp:  [15-25]   BP: (109-146)/(61-80)   SpO2:  [95 %-100 %]     VICU Surveillance Screening    Daily review of  line necessity(optional): Central line(s) in place and Urinary catheter in place    Central line type (optional): PICC        Mckeon Indications : Urinary retention    LDA reconciliation : Yes

## 2025-06-01 NOTE — PROGRESS NOTES
[facility] - Intensive Care [facility])  Critical Care Medicine  Progress Note    Patient Name: Chel Thompson  MRN: [id number]  Admission Date: 5/29/2025  Hospital Length of Stay: 3 days  Code Status: Full Code  Attending Provider: Yolie Lieberman MD  Primary Care Provider: Yesica Burk MD   Principal Problem: Intraductal papillary mucinous neoplasm of pancreas    Subjective:     HPI:  Ms. Thompson is a 64-year-old female history of hypertension who presented for a Whipple procedure for IPMN.  Patient has a history of having multiple episodes of pancreatitis.  Patient had an MRI of her spine for pain but also showed multiocular cystic mass at the head of the pancreas.  Patient then had a MRI MRCP of the abdomen in November.  This showed dilation of the main pancreatic duct.  Patient is now in PACU status post procedure.  ICU consulted for ICU management and monitoring postop.  Patient is currently drowsy and unable to provide a history.  Vital signs are stable.  She does have 2 KEVIN drains.    Hospital/ICU Course:  5/30 no acute events  No complaints   05/31/2025: Continued pain, using PCA. Up to chair this morning. Encouraging IS/ambulation. Urinary retention requiring straight cath overnight. Cont ICU care.  06/01/2025: Episode of agitation overnight, possible ICU delirium, received Haldol. Back to baseline this morning, calm. Stable to SD from an ICU standpoint.     Interval History: Alert, calm. Oriented x 2. Asking what day and time it is, reoriented to time. Asking to walk today.     Objective:     Vital Signs (Most Recent):  Temp: 97.5 °F (36.4 °C) (06/01/25 0705)  Pulse: 105 (06/01/25 0739)  Resp: 17 (06/01/25 0803)  BP: 124/71 (06/01/25 0705)  SpO2: 97 % (06/01/25 0739) Vital Signs (24h Range):  Temp:  [97.5 °F (36.4 °C)-98.6 °F (37 °C)] 97.5 °F (36.4 °C)  Pulse:  [] 105  Resp:  [14-25] 17  SpO2:  [95 %-100 %] 97 %  BP: (109-144)/(61-80) 124/71     Weight: 101.2 kg (223 lb 1.7 oz)  Body mass index  is 33.92 kg/m².      Intake/Output Summary (Last 24 hours) at 6/1/2025 0816  Last data filed at 6/1/2025 0803  Gross per 24 hour   Intake 2098.5 ml   Output 2002 ml   Net 96.5 ml        Physical Exam  Constitutional:       General: She is not in acute distress.     Appearance: She is obese. She is ill-appearing.   HENT:      Head: Normocephalic.      Nose: Nose normal.      Comments: NGT in place     Mouth/Throat:      Mouth: Mucous membranes are moist.   Eyes:      Pupils: Pupils are equal, round, and reactive to light.   Cardiovascular:      Rate and Rhythm: Normal rate.      Pulses: Normal pulses.      Heart sounds: Normal heart sounds.   Pulmonary:      Effort: Pulmonary effort is normal. No respiratory distress.      Breath sounds: Normal breath sounds.   Abdominal:      General: Bowel sounds are absent. There is no distension.      Palpations: Abdomen is soft.      Tenderness: There is abdominal tenderness.      Comments: Incision open to air, well approximated   Musculoskeletal:         General: No swelling.      Cervical back: Neck supple.   Skin:     General: Skin is warm and dry.   Neurological:      General: No focal deficit present.      Mental Status: She is alert and oriented to person, place, and time. Mental status is at baseline.      Motor: Weakness present.           Review of Systems   Constitutional:  Positive for activity change, appetite change and fatigue.   Respiratory:  Negative for cough, choking, chest tightness, shortness of breath and wheezing.    Cardiovascular:  Negative for chest pain, palpitations and leg swelling.   Gastrointestinal:  Positive for abdominal pain. Negative for constipation, diarrhea, nausea and vomiting.        Incisional   Neurological:  Positive for weakness.       Vents:  Oxygen Concentration (%): 28 (05/31/25 2113)    Lines/Drains/Airways       Peripherally Inserted Central Catheter Line  Duration             PICC Double Lumen 05/31/25 1312 right basilic <1 day  "             Drain  Duration                  Closed/Suction Drain 05/29/25 Tube - 1 Right RUQ Bulb 19 Fr. 3 days         Closed/Suction Drain 05/29/25 Tube - 2 Lateral;Right RUQ Bulb 19 Fr. 3 days         Urethral Catheter 05/31/25 1032 Non-latex <1 day              Peripheral Intravenous Line  Duration                  Peripheral IV - Single Lumen 05/29/25 0554 20 G 1 in No Anterior;Left Forearm 3 days                    Significant Labs:    CBC/Anemia Profile:  Recent Labs   Lab 05/30/25  1746 05/31/25  0327 06/01/25  0412   WBC 15.90* 15.82* 12.07   HGB 11.9* 11.0* 9.7*   HCT 35.1* 33.8* 29.4*    173 143*   MCV 91 93 91   RDW 13.4 13.3 13.2        Chemistries:  Recent Labs   Lab 05/31/25 0327 06/01/25  0412    133*   K 4.2 3.5    103   CO2 23 24   BUN 23 13   CREATININE 0.7 0.6   CALCIUM 8.3* 8.2*   ALBUMIN 2.8* 2.3*   PROT 5.6* 5.5*   BILITOT 0.7 0.8   ALKPHOS 43 45   * 107*   AST 93* 55*   MG 2.3 2.2   PHOS 1.7*  --        All pertinent labs within the past 24 hours have been reviewed.    Significant Imaging:  I have reviewed all pertinent imaging results/findings within the past 24 hours.    ABG  No results for input(s): "PH", "PO2", "PCO2", "HCO3", "BE" in the last 168 hours.  Assessment/Plan:     Assessment & Plan  Intraductal papillary mucinous neoplasm of pancreas  Now post op Whipple procedure  Labs  Plan per surgery   IS   PT/OT     5/30 plan per surgery   Pain control   IS    5/31: IS/deep breathing, increase activity as tolerated. Pain control. Will discuss nutrition with surgical team.    6/1: Pain control. Physical activity encouraged. Stable to SD from ICU standpoint.  Hypertension  IV prn's if needed  H/O acute pancreatitis  Monitor   Urinary retention  In the post op setting, requiring straight cath  -Mckeon placed per surgery team  Transaminitis  In the post op setting, trending down, monitor daily    Level III     Critical care was time spent personally by me on the " following activities: development of treatment plan with patient or surrogate and bedside caregivers, discussions with consultants, evaluation of patient's response to treatment, examination of patient, ordering and performing treatments and interventions, ordering and review of laboratory studies, ordering and review of radiographic studies, pulse oximetry, re-evaluation of patient's condition. This critical care time did not overlap with that of any other provider or involve time for any procedures.     Sophia Lyle NP  Critical Care Medicine  [facility] - Intensive Care ([facility])

## 2025-06-01 NOTE — PLAN OF CARE
Pt AAOx4. NSR/ST on monitor. BP stable. Afebrile. Clear liquid diet. 1 episode of belching/nausea that was controlled with PRN medications. Mckeon intact. 300ml UOP for this shift. Pt ambulated x3 & up in chair for meals. No bowel sounds and pt does not report passing any gas this shift. PICC intact. Bed low, wheels locked, alarms audible. POC reviewed. PCA to control pain.

## 2025-06-01 NOTE — SUBJECTIVE & OBJECTIVE
Interval History: AFVSS.  KELLEY.  Cavity trial yesterday and NG tube were removed started on sips of clear liquids which he is tolerating well without nausea or vomiting.  Still no flatus.  Only pulling 750 on incentive spirometer.    Medications:  Continuous Infusions:   D5 and 0.45% NaCl   Intravenous Continuous 20 mL/hr at 06/01/25 1200 Rate Verify at 06/01/25 1200    morphine   Intravenous Continuous   New Syringe/Bag at 06/01/25 0803     Scheduled Meds:   chlorhexidine  10 mL Mouth/Throat BID    enoxparin  40 mg Subcutaneous Q24H (prophylaxis, 1700)    famotidine (PF)  20 mg Intravenous BID    magnesium hydroxide 400 mg/5 ml  30 mL Oral BID    metoclopramide  10 mg Intravenous Q6H    mupirocin   Nasal BID    piperacillin-tazobactam (Zosyn) IV (PEDS and ADULTS) (extended infusion is not appropriate)  4.5 g Intravenous Q8H    senna  8.6 mg Oral BID     PRN Meds:  Current Facility-Administered Medications:     dextrose 50%, 12.5 g, Intravenous, PRN    glucagon (human recombinant), 1 mg, Intramuscular, PRN    haloperidol lactate, 5 mg, Intravenous, Q6H PRN    insulin aspart U-100, 0-5 Units, Subcutaneous, Q6H PRN    magnesium sulfate 2 g IVPB, 2 g, Intravenous, PRN    magnesium sulfate 2 g IVPB, 2 g, Intravenous, PRN    naloxone, 0.02 mg, Intravenous, PRN    ondansetron, 4 mg, Intravenous, Q6H PRN    potassium chloride in water, 40 mEq, Intravenous, PRN **AND** potassium chloride in water, 60 mEq, Intravenous, PRN **AND** potassium chloride in water, 80 mEq, Intravenous, PRN     Review of patient's allergies indicates:   Allergen Reactions    Dilaudid [hydromorphone] Itching     From IV    Nitrofurantoin Other (See Comments)     Cramping of legs     Objective:     Vital Signs (Most Recent):  Temp: 98.1 °F (36.7 °C) (06/01/25 1105)  Pulse: (!) 111 (06/01/25 1209)  Resp: (!) 26 (06/01/25 1209)  BP: (!) 138/91 (06/01/25 1205)  SpO2: 99 % (06/01/25 1209) Vital Signs (24h Range):  Temp:  [97.5 °F (36.4 °C)-98.6 °F (37  °C)] 98.1 °F (36.7 °C)  Pulse:  [] 111  Resp:  [15-26] 26  SpO2:  [95 %-100 %] 99 %  BP: (109-146)/(61-91) 138/91     Weight: 101.2 kg (223 lb 1.7 oz)  Body mass index is 33.92 kg/m².    Intake/Output - Last 3 Shifts         05/30 0700 05/31 0659 05/31 0700  06/01 0659 06/01 0700  06/02 0659    P.O. 560  240    I.V. (mL/kg) 2892.3 (29.2) 1821.3 (18) 293.6 (2.9)    NG/ 50     IV Piggyback 689.2 504.7 145.4    Total Intake(mL/kg) 4526.5 (45.7) 2376 (23.5) 679.1 (6.7)    Urine (mL/kg/hr) 450 (0.2) 1607 (0.7) 100 (0.2)    Drains 1245 480     Total Output 1695 2087 100    Net +2831.5 +289 +579.1                    Physical Exam  Vitals and nursing note reviewed.   Constitutional:       General: She is not in acute distress.     Appearance: Normal appearance.   HENT:      Head: Normocephalic and atraumatic.      Nose:      Comments: NGT in place- bloody/ bilious output   Eyes:      Extraocular Movements: Extraocular movements intact.      Conjunctiva/sclera: Conjunctivae normal.   Cardiovascular:      Rate and Rhythm: Normal rate and regular rhythm.   Pulmonary:      Effort: Pulmonary effort is normal.   Abdominal:      General: Abdomen is flat. There is no distension.      Palpations: Abdomen is soft. There is no mass.      Tenderness: There is no guarding or rebound.      Hernia: No hernia is present.      Comments: Appropriately TTP, incision c/d/I- with bruising, KEVIN drains serosanguinous - drain #2 more marmalade appearing    Musculoskeletal:         General: No swelling, tenderness, deformity or signs of injury. Normal range of motion.   Skin:     General: Skin is warm and dry.      Coloration: Skin is not jaundiced.   Neurological:      General: No focal deficit present.      Mental Status: She is alert and oriented to person, place, and time.   Psychiatric:         Mood and Affect: Mood normal.         Behavior: Behavior normal.         Thought Content: Thought content normal.          Significant  Labs:  I have reviewed all pertinent lab results within the past 24 hours.  CBC:   Recent Labs   Lab 06/01/25 0412   WBC 12.07   RBC 3.24*   HGB 9.7*   HCT 29.4*   *   MCV 91   MCH 29.9   MCHC 33.0     BMP:   Recent Labs   Lab 06/01/25 0412   *   *   K 3.5      CO2 24   BUN 13   CREATININE 0.6   CALCIUM 8.2*   MG 2.2       Significant Diagnostics:  I have reviewed all pertinent imaging results/findings within the past 24 hours.

## 2025-06-01 NOTE — EICU
RAINER Night Rounds Checklist  24H Vital Sign Range:  Temp:  [97.6 °F (36.4 °C)-98.3 °F (36.8 °C)]   Pulse:  []   Resp:  [12-25]   BP: (113-157)/(56-89)   SpO2:  [95 %-100 %]     Video rounds

## 2025-06-01 NOTE — ASSESSMENT & PLAN NOTE
Now post op Whipple procedure  Labs  Plan per surgery   IS   PT/OT     5/30 plan per surgery   Pain control   IS    5/31: IS/deep breathing, increase activity as tolerated. Pain control. Will discuss nutrition with surgical team.    6/1: Pain control. Physical activity encouraged. Stable to SD from ICU standpoint.

## 2025-06-01 NOTE — PLAN OF CARE
AAOx4. Around 2300, pt became agitated, verbally abusive, and argumentative despite attempts to reassure/comfort her with no improvement. HR became tachycardiac 130s. Marlee Burks NP notified of change in pt behavior and HR. Per NP orders give PRN Haldol with improvement in pt's behavior and HR. HR now NSR-ST ranging 90s-low 100s. BP normotensive throughout the shift. SPO2 98% on 2 L NC. Mckeon in place with total UOP of 407 mL. No BM during this shift. KEVIN drains x 2 with measured output of 50 mL. PCA pump continued for pain management per MD orders see MAR for more details. POC reviewed with patient and family. Bed in lowest position, side rails up x 3, wheels locked, call light within reach, and alarms on and audible.

## 2025-06-01 NOTE — PT/OT/SLP PROGRESS
"Physical Therapy  Treatment    Chel Thompson   MRN: [id number]   Admitting Diagnosis: Intraductal papillary mucinous neoplasm of pancreas       PT Start Time: 0830     PT Stop Time: 0855    PT Total Time (min): 25 min       Billable Minutes:  Gait Training 15 and Therapeutic Activity 10    Treatment Type: Treatment  PT/PTA: PTA     Number of PTA visits since last PT visit: 1       General Precautions: Standard, fall  Orthopedic Precautions: N/A  Braces: N/A  Respiratory Status: Room air         Subjective:  Communicated with TALYA prior to session.      Pain/Comfort  Pain Rating 1: 6/10  Location - Side 1: Left  Location - Orientation 1: lateral  Location 1: abdomen  Pain Rating Post-Intervention 1: 2/10    Treatment and Education:    SIT<-->STAND CG VC FOR UPPER EXTREMITY PLACEMENT    GT TRG WITH RW 2 X 70' CG. ENCOURAGEMENT TO CONTINUE WALKING DUE TO INCREASED LEFT ABDOMINAL DISCOMFORT "PULLING".     AMBULATED INITIALLY WITH 3 POINT PATTERN BUT PROGRESSED TO 2 POINT PATTERN. AS PATIENT WALKED SHE COMPLAINED LESS OF PAIN.      AM-PAC 6 CLICK MOBILITY  How much help from another person does this patient currently need?   1 = Unable, Total/Dependent Assistance  2 = A lot, Maximum/Moderate Assistance  3 = A little, Minimum/Contact Guard/Supervision  4 = None, Modified [place]/Independent    Turning over in bed (including adjusting bedclothes, sheets and blankets)?:  (NA)  Sitting down on and standing up from a chair with arms (e.g., wheelchair, bedside commode, etc.): 3  Moving from lying on back to sitting on the side of the bed?:  (NA)  Moving to and from a bed to a chair (including a wheelchair)?:  (NA)  Need to walk in hospital room?: 3  Climbing 3-5 steps with a railing?: 1    AM-PAC Raw Score CMS G-Code Modifier Level of Impairment Assistance   6 % Total / Unable   7 - 9 CM 80 - 100% Maximal Assist   10 - 14 CL 60 - 80% Moderate Assist   15 - 19 CK 40 - 60% Moderate Assist   20 - 22 CJ " 20 - 40% Minimal Assist   23 CI 1-20% SBA / CGA   24 CH 0% Independent/ Mod I     Patient left up in chair with all lines intact, call button in reach, and NSG present.    Assessment:  Chel Thompson is a 64 y.o. female with a medical diagnosis of Intraductal papillary mucinous neoplasm of pancreas and presents with .    Rehab identified problem list/impairments: weakness, impaired endurance, impaired self care skills, impaired functional mobility, gait instability, decreased upper extremity function, decreased lower extremity function, pain    Rehab potential is good.    Activity tolerance: Good    Discharge recommendations: Low Intensity Therapy      Barriers to discharge:      Equipment recommendations: walker, rolling     GOALS:   Multidisciplinary Problems       Physical Therapy Goals          Problem: Physical Therapy    Goal Priority Disciplines Outcome Interventions   Physical Therapy Goal     PT, PT/OT     Description: LTG'S TO BE MET IN 14 DAYS (6-13-25)  PT WILL BE JANETTE FOR BED MOBILITY  PT WILL BE JANETTE FOR BED<>CHAIR TF'S  PT WILL  FEET WITH OR WITHOUT RW AND JANETTE  PT WILL INC AMPAC SCORE BY 2 POINTS TO PROGRESS GROSS FUNC MOBILITY                         DME Justifications:   Chel's mobility limitation cannot be sufficiently resolved by the use of a cane. Her functional mobility deficit can be sufficiently resolved with the use of a Rolling Walker. Patient's mobility limitation significantly impairs their ability to participate in one of more activities of daily living.  The use of a RW will significantly improve the patient's ability to participate in MRADLS and the patient will use it on regular basis in the home.    PLAN:    Patient to be seen 3 x/week to address the above listed problems via gait training, therapeutic activities, therapeutic exercises  Plan of Care expires: 06/13/25  Plan of Care reviewed with: patient         06/01/2025

## 2025-06-01 NOTE — PLAN OF CARE
"SIT<-->STAND CG VC FOR UPPER EXTREMITY PLACEMENT    GT TRG WITH RW 2 X 70' CG. ENCOURAGEMENT TO CONTINUE WALKING DUE TO INCREASED LEFT ABDOMINAL DISCOMFORT "PULLING".     AMBULATED INITIALLY WITH 3 POINT PATTERN BUT PROGRESSED TO 2 POINT PATTERN. AS PATIENT WALKED SHE COMPLAINED LESS OF PAIN.   "

## 2025-06-01 NOTE — SUBJECTIVE & OBJECTIVE
Interval History: Alert, calm. Oriented x 2. Asking what day and time it is, reoriented to time. Asking to walk today.     Objective:     Vital Signs (Most Recent):  Temp: 97.5 °F (36.4 °C) (06/01/25 0705)  Pulse: 105 (06/01/25 0739)  Resp: 17 (06/01/25 0803)  BP: 124/71 (06/01/25 0705)  SpO2: 97 % (06/01/25 0739) Vital Signs (24h Range):  Temp:  [97.5 °F (36.4 °C)-98.6 °F (37 °C)] 97.5 °F (36.4 °C)  Pulse:  [] 105  Resp:  [14-25] 17  SpO2:  [95 %-100 %] 97 %  BP: (109-144)/(61-80) 124/71     Weight: 101.2 kg (223 lb 1.7 oz)  Body mass index is 33.92 kg/m².      Intake/Output Summary (Last 24 hours) at 6/1/2025 0816  Last data filed at 6/1/2025 0803  Gross per 24 hour   Intake 2098.5 ml   Output 2002 ml   Net 96.5 ml        Physical Exam  Constitutional:       General: She is not in acute distress.     Appearance: She is obese. She is ill-appearing.   HENT:      Head: Normocephalic.      Nose: Nose normal.      Comments: NGT in place     Mouth/Throat:      Mouth: Mucous membranes are moist.   Eyes:      Pupils: Pupils are equal, round, and reactive to light.   Cardiovascular:      Rate and Rhythm: Normal rate.      Pulses: Normal pulses.      Heart sounds: Normal heart sounds.   Pulmonary:      Effort: Pulmonary effort is normal. No respiratory distress.      Breath sounds: Normal breath sounds.   Abdominal:      General: Bowel sounds are absent. There is no distension.      Palpations: Abdomen is soft.      Tenderness: There is abdominal tenderness.      Comments: Incision open to air, well approximated   Musculoskeletal:         General: No swelling.      Cervical back: Neck supple.   Skin:     General: Skin is warm and dry.   Neurological:      General: No focal deficit present.      Mental Status: She is alert and oriented to person, place, and time. Mental status is at baseline.      Motor: Weakness present.           Review of Systems   Constitutional:  Positive for activity change, appetite change and  fatigue.   Respiratory:  Negative for cough, choking, chest tightness, shortness of breath and wheezing.    Cardiovascular:  Negative for chest pain, palpitations and leg swelling.   Gastrointestinal:  Positive for abdominal pain. Negative for constipation, diarrhea, nausea and vomiting.        Incisional   Neurological:  Positive for weakness.       Vents:  Oxygen Concentration (%): 28 (05/31/25 2113)    Lines/Drains/Airways       Peripherally Inserted Central Catheter Line  Duration             PICC Double Lumen 05/31/25 1312 right basilic <1 day              Drain  Duration                  Closed/Suction Drain 05/29/25 Tube - 1 Right RUQ Bulb 19 Fr. 3 days         Closed/Suction Drain 05/29/25 Tube - 2 Lateral;Right RUQ Bulb 19 Fr. 3 days         Urethral Catheter 05/31/25 1032 Non-latex <1 day              Peripheral Intravenous Line  Duration                  Peripheral IV - Single Lumen 05/29/25 0554 20 G 1 in No Anterior;Left Forearm 3 days                    Significant Labs:    CBC/Anemia Profile:  Recent Labs   Lab 05/30/25  1746 05/31/25  0327 06/01/25  0412   WBC 15.90* 15.82* 12.07   HGB 11.9* 11.0* 9.7*   HCT 35.1* 33.8* 29.4*    173 143*   MCV 91 93 91   RDW 13.4 13.3 13.2        Chemistries:  Recent Labs   Lab 05/31/25  0327 06/01/25  0412    133*   K 4.2 3.5    103   CO2 23 24   BUN 23 13   CREATININE 0.7 0.6   CALCIUM 8.3* 8.2*   ALBUMIN 2.8* 2.3*   PROT 5.6* 5.5*   BILITOT 0.7 0.8   ALKPHOS 43 45   * 107*   AST 93* 55*   MG 2.3 2.2   PHOS 1.7*  --        All pertinent labs within the past 24 hours have been reviewed.    Significant Imaging:  I have reviewed all pertinent imaging results/findings within the past 24 hours.

## 2025-06-02 LAB
ALBUMIN SERPL BCP-MCNC: 2.3 G/DL (ref 3.5–5.2)
ALP SERPL-CCNC: 68 UNIT/L (ref 40–150)
ALT SERPL W/O P-5'-P-CCNC: 83 UNIT/L (ref 10–44)
ANION GAP (OHS): 10 MMOL/L (ref 8–16)
AST SERPL-CCNC: 38 UNIT/L (ref 11–45)
BILIRUB SERPL-MCNC: 0.9 MG/DL (ref 0.1–1)
BUN SERPL-MCNC: 13 MG/DL (ref 8–23)
CALCIUM SERPL-MCNC: 7.8 MG/DL (ref 8.7–10.5)
CHLORIDE SERPL-SCNC: 100 MMOL/L (ref 95–110)
CO2 SERPL-SCNC: 24 MMOL/L (ref 23–29)
CREAT SERPL-MCNC: 0.6 MG/DL (ref 0.5–1.4)
ERYTHROCYTE [DISTWIDTH] IN BLOOD BY AUTOMATED COUNT: 13.1 % (ref 11.5–14.5)
GFR SERPLBLD CREATININE-BSD FMLA CKD-EPI: >60 ML/MIN/1.73/M2
GLUCOSE SERPL-MCNC: 102 MG/DL (ref 70–110)
HCT VFR BLD AUTO: 29.4 % (ref 37–48.5)
HGB BLD-MCNC: 9.8 GM/DL (ref 12–16)
MAGNESIUM SERPL-MCNC: 2.2 MG/DL (ref 1.6–2.6)
MCH RBC QN AUTO: 30.1 PG (ref 27–31)
MCHC RBC AUTO-ENTMCNC: 33.3 G/DL (ref 32–36)
MCV RBC AUTO: 90 FL (ref 82–98)
PHOSPHATE SERPL-MCNC: 2.5 MG/DL (ref 2.7–4.5)
PLATELET # BLD AUTO: 174 K/UL (ref 150–450)
PMV BLD AUTO: 8.8 FL (ref 9.2–12.9)
POCT GLUCOSE: 100 MG/DL (ref 70–110)
POCT GLUCOSE: 105 MG/DL (ref 70–110)
POCT GLUCOSE: 82 MG/DL (ref 70–110)
POCT GLUCOSE: 98 MG/DL (ref 70–110)
POTASSIUM SERPL-SCNC: 3.5 MMOL/L (ref 3.5–5.1)
PROT SERPL-MCNC: 5 GM/DL (ref 6–8.4)
RBC # BLD AUTO: 3.26 M/UL (ref 4–5.4)
SODIUM SERPL-SCNC: 134 MMOL/L (ref 136–145)
WBC # BLD AUTO: 9.71 K/UL (ref 3.9–12.7)

## 2025-06-02 PROCEDURE — 99900035 HC TECH TIME PER 15 MIN (STAT)

## 2025-06-02 PROCEDURE — 25000003 PHARM REV CODE 250: Performed by: SURGERY

## 2025-06-02 PROCEDURE — 84100 ASSAY OF PHOSPHORUS: CPT | Performed by: SURGERY

## 2025-06-02 PROCEDURE — 94761 N-INVAS EAR/PLS OXIMETRY MLT: CPT

## 2025-06-02 PROCEDURE — S5010 5% DEXTROSE AND 0.45% SALINE: HCPCS | Performed by: SURGERY

## 2025-06-02 PROCEDURE — 51798 US URINE CAPACITY MEASURE: CPT

## 2025-06-02 PROCEDURE — 97530 THERAPEUTIC ACTIVITIES: CPT

## 2025-06-02 PROCEDURE — 21400001 HC TELEMETRY ROOM

## 2025-06-02 PROCEDURE — 80053 COMPREHEN METABOLIC PANEL: CPT | Performed by: SURGERY

## 2025-06-02 PROCEDURE — 63600175 PHARM REV CODE 636 W HCPCS: Performed by: NURSE PRACTITIONER

## 2025-06-02 PROCEDURE — 63600175 PHARM REV CODE 636 W HCPCS: Performed by: SURGERY

## 2025-06-02 PROCEDURE — 85027 COMPLETE CBC AUTOMATED: CPT | Performed by: SURGERY

## 2025-06-02 PROCEDURE — 83735 ASSAY OF MAGNESIUM: CPT | Performed by: SURGERY

## 2025-06-02 PROCEDURE — 27000221 HC OXYGEN, UP TO 24 HOURS

## 2025-06-02 RX ORDER — ELECTROLYTES/DEXTROSE
200 SOLUTION, ORAL ORAL
Status: DISCONTINUED | OUTPATIENT
Start: 2025-06-02 | End: 2025-06-05 | Stop reason: HOSPADM

## 2025-06-02 RX ORDER — BISACODYL 10 MG/1
10 SUPPOSITORY RECTAL ONCE
Status: COMPLETED | OUTPATIENT
Start: 2025-06-02 | End: 2025-06-02

## 2025-06-02 RX ORDER — POTASSIUM CHLORIDE 7.45 MG/ML
10 INJECTION INTRAVENOUS
Status: DISCONTINUED | OUTPATIENT
Start: 2025-06-02 | End: 2025-06-02

## 2025-06-02 RX ORDER — SODIUM,POTASSIUM PHOSPHATES 280-250MG
1 POWDER IN PACKET (EA) ORAL ONCE
Status: COMPLETED | OUTPATIENT
Start: 2025-06-02 | End: 2025-06-02

## 2025-06-02 RX ORDER — FAMOTIDINE 20 MG/1
20 TABLET, FILM COATED ORAL 2 TIMES DAILY
Status: DISCONTINUED | OUTPATIENT
Start: 2025-06-02 | End: 2025-06-05 | Stop reason: HOSPADM

## 2025-06-02 RX ADMIN — Medication 200 ML: at 05:06

## 2025-06-02 RX ADMIN — MAGNESIUM HYDROXIDE 2400 MG: 400 SUSPENSION ORAL at 09:06

## 2025-06-02 RX ADMIN — CHLORHEXIDINE GLUCONATE 0.12% ORAL RINSE 10 ML: 1.2 LIQUID ORAL at 09:06

## 2025-06-02 RX ADMIN — METOCLOPRAMIDE 10 MG: 5 INJECTION, SOLUTION INTRAMUSCULAR; INTRAVENOUS at 11:06

## 2025-06-02 RX ADMIN — METOCLOPRAMIDE 10 MG: 5 INJECTION, SOLUTION INTRAMUSCULAR; INTRAVENOUS at 05:06

## 2025-06-02 RX ADMIN — MORPHINE SULFATE: 1 INJECTION INTRAVENOUS at 07:06

## 2025-06-02 RX ADMIN — SENNOSIDES 8.6 MG: 8.6 TABLET, FILM COATED ORAL at 09:06

## 2025-06-02 RX ADMIN — FAMOTIDINE 20 MG: 10 INJECTION, SOLUTION INTRAVENOUS at 09:06

## 2025-06-02 RX ADMIN — BISACODYL 10 MG: 10 SUPPOSITORY RECTAL at 01:06

## 2025-06-02 RX ADMIN — DEXTROSE AND SODIUM CHLORIDE: 5; 450 INJECTION, SOLUTION INTRAVENOUS at 01:06

## 2025-06-02 RX ADMIN — PIPERACILLIN SODIUM AND TAZOBACTAM SODIUM 4.5 G: 4; .5 INJECTION, POWDER, FOR SOLUTION INTRAVENOUS at 06:06

## 2025-06-02 RX ADMIN — Medication 200 ML: at 02:06

## 2025-06-02 RX ADMIN — Medication 200 ML: at 09:06

## 2025-06-02 RX ADMIN — FAMOTIDINE 20 MG: 20 TABLET, FILM COATED ORAL at 09:06

## 2025-06-02 RX ADMIN — MUPIROCIN: 20 OINTMENT TOPICAL at 09:06

## 2025-06-02 RX ADMIN — ENOXAPARIN SODIUM 40 MG: 40 INJECTION SUBCUTANEOUS at 05:06

## 2025-06-02 RX ADMIN — METOCLOPRAMIDE 10 MG: 5 INJECTION, SOLUTION INTRAMUSCULAR; INTRAVENOUS at 06:06

## 2025-06-02 RX ADMIN — PIPERACILLIN SODIUM AND TAZOBACTAM SODIUM 4.5 G: 4; .5 INJECTION, POWDER, FOR SOLUTION INTRAVENOUS at 02:06

## 2025-06-02 RX ADMIN — PIPERACILLIN SODIUM AND TAZOBACTAM SODIUM 4.5 G: 4; .5 INJECTION, POWDER, FOR SOLUTION INTRAVENOUS at 11:06

## 2025-06-02 RX ADMIN — Medication 1 PACKET: at 01:06

## 2025-06-02 RX ADMIN — METOCLOPRAMIDE 10 MG: 5 INJECTION, SOLUTION INTRAMUSCULAR; INTRAVENOUS at 01:06

## 2025-06-02 RX ADMIN — POTASSIUM CHLORIDE 40 MEQ: 29.8 INJECTION, SOLUTION INTRAVENOUS at 10:06

## 2025-06-02 NOTE — PLAN OF CARE
Problem: Adult Inpatient Plan of Care  Goal: Plan of Care Review  Outcome: Not Progressing     Problem: Adult Inpatient Plan of Care  Goal: Optimal Comfort and Wellbeing  Outcome: Not Progressing     Problem: Wound  Goal: Improved Oral Intake  Outcome: Not Progressing     Problem: Wound  Goal: Optimal Functional Ability  Outcome: Not Progressing     Problem: Wound  Goal: Absence of Infection Signs and Symptoms  Outcome: Not Progressing     Problem: Wound  Goal: Optimal Wound Healing  Outcome: Not Progressing     Problem: Wound  Goal: Skin Health and Integrity  Outcome: Not Progressing

## 2025-06-02 NOTE — CONSULTS
[facility] - Intensive Care ([facility])  Hospital Medicine  Consult Note    Patient Name: Chel Thompson  MRN: [id number]  Admission Date: 5/29/2025  Hospital Length of Stay: 3 days  Attending Physician: Yolie Lieberman MD   Primary Care Provider: Yesica Burk MD           Patient information was obtained from past medical records and ER records.     Consults  Subjective:     Principal Problem: Intraductal papillary mucinous neoplasm of pancreas    Chief Complaint: No chief complaint on file.       HPI: Ms. Thompson is a 64-year-old female history of hypertension who presented for a Whipple procedure for IPMN.  Patient has a history of having multiple episodes of pancreatitis.  Patient had an MRI of her spine for pain but also showed multiocular cystic mass at the head of the pancreas.  Patient then had a MRI MRCP of the abdomen in November.  This showed dilation of the main pancreatic duct.  Patient is now in PACU status post procedure.  ICU consulted for ICU management and monitoring postop.  Patient is currently drowsy and unable to provide a history.  Vital signs are stable.  She does have 2 KEVIN drains.     Hospital/ICU Course:  5/30 no acute events  No complaints   05/31/2025: Continued pain, using PCA. Up to chair this morning. Encouraging IS/ambulation. Urinary retention requiring straight cath overnight. Cont ICU care.  06/01/2025: Episode of agitation overnight, possible ICU delirium, received Haldol. Back to baseline this morning, calm. Stable to SD from an ICU standpoint.   Hospital medicine consulted.     Past Medical History:   Diagnosis Date    Herpes genitalis in women 05/27/2025    Hypertension     Hypertensive heart disease without heart failure 02/04/2021    Intraductal papillary mucinous neoplasm of pancreas 03/04/2025    Pancreatitis     4 times (2009,2013)    UTI (urinary tract infection)        Past Surgical History:   Procedure Laterality Date    COLONOSCOPY N/A 8/22/2024    Procedure:  COLONOSCOPY;  Surgeon: Kya Garcia MD;  Location: [facility] ENDO;  Service: Endoscopy;  Laterality: N/A;    DILATION AND CURETTAGE OF UTERUS      ENDOSCOPIC ULTRASOUND OF UPPER GASTROINTESTINAL TRACT N/A 2/26/2025    Procedure: ULTRASOUND, UPPER GI TRACT, ENDOSCOPIC;  Surgeon: Abdi Mtz MD;  Location: [facility] ENDO;  Service: Endoscopy;  Laterality: N/A;  2/21 portal-EUS with anyone in next 2-3 weeks, either site should be ok. [id number]    MULTIPLE TOOTH EXTRACTIONS      vaginal delivery      varicose veins      WHIPPLE PROCEDURE N/A 5/29/2025    Procedure: WHIPPLE PROCEDURE;  Surgeon: Yolie Lieberman MD;  Location: [facility] OR;  Service: Oncology;  Laterality: N/A;       Review of patient's allergies indicates:   Allergen Reactions    Dilaudid [hydromorphone] Itching     From IV    Nitrofurantoin Other (See Comments)     Cramping of legs       No current facility-administered medications on file prior to encounter.     Current Outpatient Medications on File Prior to Encounter   Medication Sig    cyclobenzaprine (FLEXERIL) 5 MG tablet Take 1 tablet (5 mg total) by mouth 3 (three) times daily as needed for Muscle spasms.    traZODone (DESYREL) 50 MG tablet Take 1 tablet (50 mg total) by mouth every evening.    valsartan-hydrochlorothiazide (DIOVAN-HCT) 160-12.5 mg per tablet Take 1 tablet by mouth every morning.     Family History       Problem Relation (Age of Onset)    Asthma Mother    COPD Brother    Cancer Maternal Grandmother, Maternal Grandfather    Hypertension Mother          Tobacco Use    Smoking status: Never    Smokeless tobacco: Never   Substance and Sexual Activity    Alcohol use: Not Currently     Comment: occasionally    Drug use: No    Sexual activity: Yes     Partners: Male     Review of Systems  Objective:     Vital Signs (Most Recent):  Temp: 97.9 °F (36.6 °C) (06/01/25 1505)  Pulse: 104 (06/01/25 1818)  Resp: 16 (06/01/25 1818)  BP: 128/79 (06/01/25 1805)  SpO2: 98 % (06/01/25 1818) Vital Signs  (24h Range):  Temp:  [97.5 °F (36.4 °C)-98.6 °F (37 °C)] 97.9 °F (36.6 °C)  Pulse:  [] 104  Resp:  [14-26] 16  SpO2:  [94 %-100 %] 98 %  BP: (109-146)/(61-91) 128/79     Weight: 101.2 kg (223 lb 1.7 oz)  Body mass index is 33.92 kg/m².     Physical Exam  HENT:      Head: Normocephalic and atraumatic.   Cardiovascular:      Rate and Rhythm: Normal rate and regular rhythm.      Heart sounds: No murmur heard.  Pulmonary:      Effort: Pulmonary effort is normal. No respiratory distress.      Breath sounds: Normal breath sounds. No wheezing.   Abdominal:      General: Bowel sounds are normal. There is no distension.      Palpations: Abdomen is soft.      Tenderness: There is no abdominal tenderness.   Musculoskeletal:         General: No swelling.   Skin:     General: Skin is warm and dry.   Neurological:      Mental Status: She is alert and oriented to person, place, and time. Mental status is at baseline.          Significant Labs: All pertinent labs within the past 24 hours have been reviewed.  Recent Lab Results  (Last 5 results in the past 24 hours)        06/01/25  1317   06/01/25  1020   06/01/25  0848   06/01/25  0412   06/01/25  0209        Albumin       2.3         ALP       45         ALT       107         Amylase, Fluid   258  Comment: Reference intervals have not been established for body fluids.  Comparison of this result with the concentration in the blood,  serum,or plasma is recommended.  The reference interval for amylase in serum/plasma is    U/L. Please interpret in context with the clinical  picture.     875  Comment: Reference intervals have not been established for body fluids.  Comparison of this result with the concentration in the blood,  serum,or plasma is recommended.  The reference interval for amylase in serum/plasma is    U/L. Please interpret in context with the clinical  picture.         Anion Gap       6         AST       55         BILIRUBIN TOTAL       0.8  Comment:  For infants and newborns, interpretation of results should be based   on gestational age, weight and in agreement with clinical   observations.    Premature Infant recommended reference ranges:   0-24 hours:  <8.0 mg/dL   24-48 hours: <12.0 mg/dL   3-5 days:    <15.0 mg/dL   6-29 days:   <15.0 mg/dL         BUN       13         Calcium       8.2         Chloride       103         CO2       24         Creatinine       0.6         eGFR       >60  Comment: Estimated GFR calculated using the CKD-EPI creatinine (2021) equation.         Glucose       119         Hematocrit       29.4         Hemoglobin       9.7         Magnesium        2.2         MCH       29.9         MCHC       33.0         MCV       91         MPV       8.8         Platelet Count       143         POCT Glucose 120     106     116       Potassium       3.5         PROTEIN TOTAL       5.5         RBC       3.24         RDW       13.2         Sodium       133         WBC       12.07                                Significant Imaging: I have reviewed all pertinent imaging results/findings within the past 24 hours.  Assessment/Plan:     * Intraductal papillary mucinous neoplasm of pancreas  -s/p Whipple  -per primary team      Transaminitis  -improving  -avoid hepatoxic medications      Urinary retention  -currently with anand  -spontaneous voiding trial soon      Hypertension  Patient's blood pressure range in the last 24 hours was: BP  Min: 109/65  Max: 146/80.The patient's inpatient anti-hypertensive regimen is listed below:  Current Antihypertensives       Plan  - holding home medication  -resume valsartan as BP allows      VTE Risk Mitigation (From admission, onward)           Ordered     enoxaparin injection 40 mg  Every 24 hours         05/31/25 1112     Place sequential compression device  Until discontinued         05/29/25 4526                        Thank you for your consult. I will follow-up with patient. Please contact us if you have any  additional questions.    Chemo Corona MD  Department of Hospital Medicine   [facility] - Intensive Care ([facility])

## 2025-06-02 NOTE — ASSESSMENT & PLAN NOTE
POD #5- s/p open whipple.     Doing well.  Had BM this AM.      -- low fat diet  -- d/c PCA.  Starting on PO tylenol, ibuprofen, and oxycodone.  Holding home trazodone and flexeril.   -- continue Zosyn while panc leak   -- continue reglan IV   -- continue BID senna and MOM today   -- checking drain amylases today     -- encourage ambulation and IS  Dispo: continued med/surg     Lovenox and SCDs for DVT ppx, Famotidine for GI ppx

## 2025-06-02 NOTE — SUBJECTIVE & OBJECTIVE
Past Medical History:   Diagnosis Date    Herpes genitalis in women 05/27/2025    Hypertension     Hypertensive heart disease without heart failure 02/04/2021    Intraductal papillary mucinous neoplasm of pancreas 03/04/2025    Pancreatitis     4 times (2009,2013)    UTI (urinary tract infection)        Past Surgical History:   Procedure Laterality Date    COLONOSCOPY N/A 8/22/2024    Procedure: COLONOSCOPY;  Surgeon: Kya Garcia MD;  Location: [facility] ENDO;  Service: Endoscopy;  Laterality: N/A;    DILATION AND CURETTAGE OF UTERUS      ENDOSCOPIC ULTRASOUND OF UPPER GASTROINTESTINAL TRACT N/A 2/26/2025    Procedure: ULTRASOUND, UPPER GI TRACT, ENDOSCOPIC;  Surgeon: Abdi Mtz MD;  Location: [facility] ENDO;  Service: Endoscopy;  Laterality: N/A;  2/21 portal-EUS with anyone in next 2-3 weeks, either site should be ok. [id number]    MULTIPLE TOOTH EXTRACTIONS      vaginal delivery      varicose veins      WHIPPLE PROCEDURE N/A 5/29/2025    Procedure: WHIPPLE PROCEDURE;  Surgeon: Yolie Lieberman MD;  Location: [facility] OR;  Service: Oncology;  Laterality: N/A;       Review of patient's allergies indicates:   Allergen Reactions    Dilaudid [hydromorphone] Itching     From IV    Nitrofurantoin Other (See Comments)     Cramping of legs       No current facility-administered medications on file prior to encounter.     Current Outpatient Medications on File Prior to Encounter   Medication Sig    cyclobenzaprine (FLEXERIL) 5 MG tablet Take 1 tablet (5 mg total) by mouth 3 (three) times daily as needed for Muscle spasms.    traZODone (DESYREL) 50 MG tablet Take 1 tablet (50 mg total) by mouth every evening.    valsartan-hydrochlorothiazide (DIOVAN-HCT) 160-12.5 mg per tablet Take 1 tablet by mouth every morning.     Family History       Problem Relation (Age of Onset)    Asthma Mother    COPD Brother    Cancer Maternal Grandmother, Maternal Grandfather    Hypertension Mother          Tobacco Use    Smoking status: Never     Smokeless tobacco: Never   Substance and Sexual Activity    Alcohol use: Not Currently     Comment: occasionally    Drug use: No    Sexual activity: Yes     Partners: Male     Review of Systems  Objective:     Vital Signs (Most Recent):  Temp: 97.9 °F (36.6 °C) (06/01/25 1505)  Pulse: 104 (06/01/25 1818)  Resp: 16 (06/01/25 1818)  BP: 128/79 (06/01/25 1805)  SpO2: 98 % (06/01/25 1818) Vital Signs (24h Range):  Temp:  [97.5 °F (36.4 °C)-98.6 °F (37 °C)] 97.9 °F (36.6 °C)  Pulse:  [] 104  Resp:  [14-26] 16  SpO2:  [94 %-100 %] 98 %  BP: (109-146)/(61-91) 128/79     Weight: 101.2 kg (223 lb 1.7 oz)  Body mass index is 33.92 kg/m².     Physical Exam  HENT:      Head: Normocephalic and atraumatic.   Cardiovascular:      Rate and Rhythm: Normal rate and regular rhythm.      Heart sounds: No murmur heard.  Pulmonary:      Effort: Pulmonary effort is normal. No respiratory distress.      Breath sounds: Normal breath sounds. No wheezing.   Abdominal:      General: Bowel sounds are normal. There is no distension.      Palpations: Abdomen is soft.      Tenderness: There is no abdominal tenderness.   Musculoskeletal:         General: No swelling.   Skin:     General: Skin is warm and dry.   Neurological:      Mental Status: She is alert and oriented to person, place, and time. Mental status is at baseline.          Significant Labs: All pertinent labs within the past 24 hours have been reviewed.  Recent Lab Results  (Last 5 results in the past 24 hours)        06/01/25  1317   06/01/25  1020   06/01/25  0848   06/01/25  0412   06/01/25  0209        Albumin       2.3         ALP       45         ALT       107         Amylase, Fluid   258  Comment: Reference intervals have not been established for body fluids.  Comparison of this result with the concentration in the blood,  serum,or plasma is recommended.  The reference interval for amylase in serum/plasma is    U/L. Please interpret in context with the  clinical  picture.     875  Comment: Reference intervals have not been established for body fluids.  Comparison of this result with the concentration in the blood,  serum,or plasma is recommended.  The reference interval for amylase in serum/plasma is    U/L. Please interpret in context with the clinical  picture.         Anion Gap       6         AST       55         BILIRUBIN TOTAL       0.8  Comment: For infants and newborns, interpretation of results should be based   on gestational age, weight and in agreement with clinical   observations.    Premature Infant recommended reference ranges:   0-24 hours:  <8.0 mg/dL   24-48 hours: <12.0 mg/dL   3-5 days:    <15.0 mg/dL   6-29 days:   <15.0 mg/dL         BUN       13         Calcium       8.2         Chloride       103         CO2       24         Creatinine       0.6         eGFR       >60  Comment: Estimated GFR calculated using the CKD-EPI creatinine (2021) equation.         Glucose       119         Hematocrit       29.4         Hemoglobin       9.7         Magnesium        2.2         MCH       29.9         MCHC       33.0         MCV       91         MPV       8.8         Platelet Count       143         POCT Glucose 120     106     116       Potassium       3.5         PROTEIN TOTAL       5.5         RBC       3.24         RDW       13.2         Sodium       133         WBC       12.07                                Significant Imaging: I have reviewed all pertinent imaging results/findings within the past 24 hours.

## 2025-06-02 NOTE — PROGRESS NOTES
[facility] ([facility])  Hospital Medicine  Progress Note    Patient Name: Chel Thompson  MRN: [id number]  Patient Class: IP- Inpatient   Admission Date: 5/29/2025  Length of Stay: 4 days  Attending Physician: Yolie Lieberman MD  Primary Care Provider: Yesica Burk MD        Subjective     Principal Problem:Intraductal papillary mucinous neoplasm of pancreas        HPI:  Ms. Thompson is a 64-year-old female history of hypertension who presented for a Whipple procedure for IPMN.  Patient has a history of having multiple episodes of pancreatitis.  Patient had an MRI of her spine for pain but also showed multiocular cystic mass at the head of the pancreas.  Patient then had a MRI MRCP of the abdomen in November.  This showed dilation of the main pancreatic duct.  Patient is now in PACU status post procedure.  ICU consulted for ICU management and monitoring postop.  Patient is currently drowsy and unable to provide a history.  Vital signs are stable.  She does have 2 KEVIN drains.     Hospital/ICU Course:  5/30 no acute events  No complaints   05/31/2025: Continued pain, using PCA. Up to chair this morning. Encouraging IS/ambulation. Urinary retention requiring straight cath overnight. Cont ICU care.  06/01/2025: Episode of agitation overnight, possible ICU delirium, received Haldol. Back to baseline this morning, calm. Stable to SD from an ICU standpoint.   Hospital medicine consulted.     Overview/Hospital Course:  No notes on file    Interval History: Follow up for Intraductal papillary mucinous neoplasm of pancreas.  She seen and examined sitting up in chair and states she is feel much better.  Plan for anand removal again today.  KEVIN drains X 2 in place.  Labs and vitals stable.    Review of Systems  Objective:     Vital Signs (Most Recent):  Temp: 98.3 °F (36.8 °C) (06/02/25 0705)  Pulse: 91 (06/02/25 1000)  Resp: 20 (06/02/25 1000)  BP: (!) 141/81 (06/02/25 1000)  SpO2: 98 % (06/02/25 1000)  Vital Signs (24h Range):  Temp:  [97.9 °F (36.6 °C)-98.3 °F (36.8 °C)] 98.3 °F (36.8 °C)  Pulse:  [] 91  Resp:  [14-29] 20  SpO2:  [94 %-100 %] 98 %  BP: (115-157)/(63-84) 141/81     Weight: 101.2 kg (223 lb 1.7 oz)  Body mass index is 33.92 kg/m².    Intake/Output Summary (Last 24 hours) at 6/2/2025 1336  Last data filed at 6/2/2025 1000  Gross per 24 hour   Intake 986.03 ml   Output 965 ml   Net 21.03 ml         Physical Exam  Vitals and nursing note reviewed.   Constitutional:       Appearance: Normal appearance.   HENT:      Head: Normocephalic and atraumatic.      Nose: Nose normal.      Mouth/Throat:      Mouth: Mucous membranes are moist.   Eyes:      Extraocular Movements: Extraocular movements intact.      Conjunctiva/sclera: Conjunctivae normal.   Cardiovascular:      Rate and Rhythm: Normal rate and regular rhythm.      Pulses: Normal pulses.      Heart sounds: Normal heart sounds.   Pulmonary:      Effort: Pulmonary effort is normal.      Breath sounds: Normal breath sounds.   Abdominal:      General: Abdomen is flat.      Palpations: Abdomen is soft.      Comments: KEVIN drain x 2.  Drain #1 clear yellow outpatient, Drain #2 Light red outpatient.   Genitourinary:     Comments: Mckeon in place - clear yellow urine  Musculoskeletal:         General: Normal range of motion.      Cervical back: Normal range of motion and neck supple.   Skin:     General: Skin is warm.      Capillary Refill: Capillary refill takes less than 2 seconds.   Neurological:      Mental Status: She is alert and oriented to person, place, and time. Mental status is at baseline.   Psychiatric:         Mood and Affect: Mood normal.         Behavior: Behavior normal.               Significant Labs: All pertinent labs within the past 24 hours have been reviewed.  Recent Lab Results         06/02/25  0941   06/02/25  0613   06/02/25  0527        Albumin     2.3       ALP     68       ALT     83       Anion Gap     10       AST     38        BILIRUBIN TOTAL     0.9  Comment: For infants and newborns, interpretation of results should be based   on gestational age, weight and in agreement with clinical   observations.    Premature Infant recommended reference ranges:   0-24 hours:  <8.0 mg/dL   24-48 hours: <12.0 mg/dL   3-5 days:    <15.0 mg/dL   6-29 days:   <15.0 mg/dL       BUN     13       Calcium     7.8       Chloride     100       CO2     24       Creatinine     0.6       eGFR     >60  Comment: Estimated GFR calculated using the CKD-EPI creatinine (2021) equation.       Glucose     102       Hematocrit     29.4       Hemoglobin     9.8       Magnesium      2.2       MCH     30.1       MCHC     33.3       MCV     90       MPV     8.8       Phosphorus Level     2.5       Platelet Count     174       POCT Glucose 105   100         Potassium     3.5       PROTEIN TOTAL     5.0       RBC     3.26       RDW     13.1       Sodium     134       WBC     9.71               Significant Imaging: I have reviewed all pertinent imaging results/findings within the past 24 hours.      Assessment & Plan  Intraductal papillary mucinous neoplasm of pancreas  -s/p Whipple (POD #4)  -per primary team  -monitor KEVIN drain x 2  -Anand removal today    Hypertension  Patient's blood pressure range in the last 24 hours was: BP  Min: 115/63  Max: 157/72.The patient's inpatient anti-hypertensive regimen is listed below:  Current Antihypertensives       Plan  - holding home medication  -resume valsartan as BP allows  H/O acute pancreatitis      Urinary retention  -currently with anand  -spontaneous voiding trial today    Transaminitis  -improving  -avoid hepatoxic medications    VTE Risk Mitigation (From admission, onward)           Ordered     enoxaparin injection 40 mg  Every 24 hours         05/31/25 1112     Place sequential compression device  Until discontinued         05/29/25 1746                    Discharge Planning   DAVIDA:      Code Status: Full Code   Medical  Readiness for Discharge Date:   Discharge Plan A: Home Health          Please place Justification for DME        Gary Dubose MD  Department of Hospital Medicine   [facility] ([facility])

## 2025-06-02 NOTE — PLAN OF CARE
Pt care assumed around 1415. VS stable. ABD incision with dermabond C/D/I. AbD pain managed with PCA, see MAR. 2L nasal cannula; no issues. Mckeon removed around 1400, pt due to void. KEVIN drains x2 remain in place. Around 1600, Pt ambulated 1x around unit with walker; well-tolerated. Pt updated on status and plan of care. Downgrade orders remain in place.

## 2025-06-02 NOTE — ASSESSMENT & PLAN NOTE
Patient's blood pressure range in the last 24 hours was: BP  Min: 115/63  Max: 157/72.The patient's inpatient anti-hypertensive regimen is listed below:  Current Antihypertensives       Plan  - holding home medication  -resume valsartan as BP allows

## 2025-06-02 NOTE — ASSESSMENT & PLAN NOTE
POD #4- s/p open whipple.     Doing ok.  No nausea/ vomiting, tolerating CLD.  Awaiting return of bowel function.  Amylases yesterday downtrending      -- KVO IVF  -- d/c anand today   -- continue Zosyn while panc leak   -- Morphine PCA for pain.  Holding home trazodone and flexeril.  Plan to d/c pca tomorrow   -- continue reglan IV   -- starting BID senna and MOM today   -- checking drain amylases tomorrow     -- encourage ambulation and IS  Dispo: continued med/surg     Lovenox and SCDs for DVT ppx, Famotidine for GI ppx

## 2025-06-02 NOTE — HPI
Ms. Thompson is a 64-year-old female history of hypertension who presented for a Whipple procedure for IPMN.  Patient has a history of having multiple episodes of pancreatitis.  Patient had an MRI of her spine for pain but also showed multiocular cystic mass at the head of the pancreas.  Patient then had a MRI MRCP of the abdomen in November.  This showed dilation of the main pancreatic duct.  Patient is now in PACU status post procedure.  ICU consulted for ICU management and monitoring postop.  Patient is currently drowsy and unable to provide a history.  Vital signs are stable.  She does have 2 KEVIN drains.     Hospital/ICU Course:  5/30 no acute events  No complaints   05/31/2025: Continued pain, using PCA. Up to chair this morning. Encouraging IS/ambulation. Urinary retention requiring straight cath overnight. Cont ICU care.  06/01/2025: Episode of agitation overnight, possible ICU delirium, received Haldol. Back to baseline this morning, calm. Stable to SD from an ICU standpoint.   Hospital medicine consulted.

## 2025-06-02 NOTE — SUBJECTIVE & OBJECTIVE
Interval History: AFVSS.  KELLEY.  Feeling better today. No flatus or BM yet.  Tolerating CLD without nausea/ vomiting.      Medications:  Continuous Infusions:   morphine   Intravenous Continuous   New Syringe/Bag at 06/02/25 0706     Scheduled Meds:   bisacodyL  10 mg Rectal Once    chlorhexidine  10 mL Mouth/Throat BID    electrolytes-dextrose  200 mL Oral Q4H    enoxparin  40 mg Subcutaneous Q24H (prophylaxis, 1700)    famotidine  20 mg Oral BID    magnesium hydroxide 400 mg/5 ml  30 mL Oral BID    metoclopramide  10 mg Intravenous Q6H    mupirocin   Nasal BID    piperacillin-tazobactam (Zosyn) IV (PEDS and ADULTS) (extended infusion is not appropriate)  4.5 g Intravenous Q8H    senna  8.6 mg Oral BID     PRN Meds:  Current Facility-Administered Medications:     dextrose 50%, 12.5 g, Intravenous, PRN    glucagon (human recombinant), 1 mg, Intramuscular, PRN    haloperidol lactate, 5 mg, Intravenous, Q6H PRN    insulin aspart U-100, 0-5 Units, Subcutaneous, Q6H PRN    magnesium sulfate 2 g IVPB, 2 g, Intravenous, PRN    magnesium sulfate 2 g IVPB, 2 g, Intravenous, PRN    naloxone, 0.02 mg, Intravenous, PRN    ondansetron, 4 mg, Intravenous, Q6H PRN    pneumoc 20-rajinder conj-dip cr(PF), 0.5 mL, Intramuscular, Prior to discharge    potassium chloride in water, 40 mEq, Intravenous, PRN **AND** potassium chloride in water, 60 mEq, Intravenous, PRN **AND** potassium chloride in water, 80 mEq, Intravenous, PRN     Review of patient's allergies indicates:   Allergen Reactions    Dilaudid [hydromorphone] Itching     From IV    Nitrofurantoin Other (See Comments)     Cramping of legs     Objective:     Vital Signs (Most Recent):  Temp: 98.3 °F (36.8 °C) (06/02/25 0705)  Pulse: 91 (06/02/25 1000)  Resp: 20 (06/02/25 1000)  BP: (!) 141/81 (06/02/25 1000)  SpO2: 98 % (06/02/25 1000) Vital Signs (24h Range):  Temp:  [97.9 °F (36.6 °C)-98.3 °F (36.8 °C)] 98.3 °F (36.8 °C)  Pulse:  [] 91  Resp:  [14-29] 20  SpO2:  [94 %-100 %]  98 %  BP: (115-157)/(63-84) 141/81     Weight: 101.2 kg (223 lb 1.7 oz)  Body mass index is 33.92 kg/m².    Intake/Output - Last 3 Shifts         05/31 0700  06/01 0659 06/01 0700  06/02 0659 06/02 0700  06/03 0659    P.O.  480     I.V. (mL/kg) 1821.3 (18) 405.6 (4) 317.9 (3.1)    NG/GT 50      IV Piggyback 504.7 249.8 253.4    Total Intake(mL/kg) 2376 (23.5) 1135.4 (11.2) 571.3 (5.6)    Urine (mL/kg/hr) 1607 (0.7) 825 (0.3) 185 (0.3)    Drains 480 175     Total Output 2087 1000 185    Net +289 +135.4 +386.3                    Physical Exam  Vitals and nursing note reviewed.   Constitutional:       General: She is not in acute distress.     Appearance: Normal appearance.   HENT:      Head: Normocephalic and atraumatic.      Nose:      Comments: NGT in place- bloody/ bilious output   Eyes:      Extraocular Movements: Extraocular movements intact.      Conjunctiva/sclera: Conjunctivae normal.   Cardiovascular:      Rate and Rhythm: Normal rate and regular rhythm.   Pulmonary:      Effort: Pulmonary effort is normal.   Abdominal:      General: Abdomen is flat. There is no distension.      Palpations: Abdomen is soft. There is no mass.      Tenderness: There is no guarding or rebound.      Hernia: No hernia is present.      Comments: Appropriately TTP, incision c/d/I- with bruising, KEVIN drain #1- serosang, KEVIN drain #2- serous    Musculoskeletal:         General: No swelling, tenderness, deformity or signs of injury. Normal range of motion.   Skin:     General: Skin is warm and dry.      Coloration: Skin is not jaundiced.   Neurological:      General: No focal deficit present.      Mental Status: She is alert and oriented to person, place, and time.   Psychiatric:         Mood and Affect: Mood normal.         Behavior: Behavior normal.         Thought Content: Thought content normal.          Significant Labs:  I have reviewed all pertinent lab results within the past 24 hours.  CBC:   Recent Labs   Lab 06/02/25  4195    WBC 9.71   RBC 3.26*   HGB 9.8*   HCT 29.4*      MCV 90   MCH 30.1   MCHC 33.3     BMP:   Recent Labs   Lab 06/02/25  0527      *   K 3.5      CO2 24   BUN 13   CREATININE 0.6   CALCIUM 7.8*   MG 2.2       Significant Diagnostics:  I have reviewed all pertinent imaging results/findings within the past 24 hours.

## 2025-06-02 NOTE — PROGRESS NOTES
[facility] ([facility])  General Surgery  Progress Note    Subjective:     History of Present Illness:  No notes on file    Post-Op Info:  Procedure(s) (LRB):  WHIPPLE PROCEDURE (N/A)   4 Days Post-Op     Interval History: AFVSS.  KELLEY.  Feeling better today. No flatus or BM yet.  Tolerating CLD without nausea/ vomiting.      Medications:  Continuous Infusions:   morphine   Intravenous Continuous   New Syringe/Bag at 06/02/25 0706     Scheduled Meds:   bisacodyL  10 mg Rectal Once    chlorhexidine  10 mL Mouth/Throat BID    electrolytes-dextrose  200 mL Oral Q4H    enoxparin  40 mg Subcutaneous Q24H (prophylaxis, 1700)    famotidine  20 mg Oral BID    magnesium hydroxide 400 mg/5 ml  30 mL Oral BID    metoclopramide  10 mg Intravenous Q6H    mupirocin   Nasal BID    piperacillin-tazobactam (Zosyn) IV (PEDS and ADULTS) (extended infusion is not appropriate)  4.5 g Intravenous Q8H    senna  8.6 mg Oral BID     PRN Meds:  Current Facility-Administered Medications:     dextrose 50%, 12.5 g, Intravenous, PRN    glucagon (human recombinant), 1 mg, Intramuscular, PRN    haloperidol lactate, 5 mg, Intravenous, Q6H PRN    insulin aspart U-100, 0-5 Units, Subcutaneous, Q6H PRN    magnesium sulfate 2 g IVPB, 2 g, Intravenous, PRN    magnesium sulfate 2 g IVPB, 2 g, Intravenous, PRN    naloxone, 0.02 mg, Intravenous, PRN    ondansetron, 4 mg, Intravenous, Q6H PRN    pneumoc 20-rajinder conj-dip cr(PF), 0.5 mL, Intramuscular, Prior to discharge    potassium chloride in water, 40 mEq, Intravenous, PRN **AND** potassium chloride in water, 60 mEq, Intravenous, PRN **AND** potassium chloride in water, 80 mEq, Intravenous, PRN     Review of patient's allergies indicates:   Allergen Reactions    Dilaudid [hydromorphone] Itching     From IV    Nitrofurantoin Other (See Comments)     Cramping of legs     Objective:     Vital Signs (Most Recent):  Temp: 98.3 °F (36.8 °C) (06/02/25 0705)  Pulse: 91 (06/02/25 1000)  Resp: 20 (06/02/25  1000)  BP: (!) 141/81 (06/02/25 1000)  SpO2: 98 % (06/02/25 1000) Vital Signs (24h Range):  Temp:  [97.9 °F (36.6 °C)-98.3 °F (36.8 °C)] 98.3 °F (36.8 °C)  Pulse:  [] 91  Resp:  [14-29] 20  SpO2:  [94 %-100 %] 98 %  BP: (115-157)/(63-84) 141/81     Weight: 101.2 kg (223 lb 1.7 oz)  Body mass index is 33.92 kg/m².    Intake/Output - Last 3 Shifts         05/31 0700  06/01 0659 06/01 0700 06/02 0659 06/02 0700  06/03 0659    P.O.  480     I.V. (mL/kg) 1821.3 (18) 405.6 (4) 317.9 (3.1)    NG/GT 50      IV Piggyback 504.7 249.8 253.4    Total Intake(mL/kg) 2376 (23.5) 1135.4 (11.2) 571.3 (5.6)    Urine (mL/kg/hr) 1607 (0.7) 825 (0.3) 185 (0.3)    Drains 480 175     Total Output 2087 1000 185    Net +289 +135.4 +386.3                    Physical Exam  Vitals and nursing note reviewed.   Constitutional:       General: She is not in acute distress.     Appearance: Normal appearance.   HENT:      Head: Normocephalic and atraumatic.      Nose:      Comments: NGT in place- bloody/ bilious output   Eyes:      Extraocular Movements: Extraocular movements intact.      Conjunctiva/sclera: Conjunctivae normal.   Cardiovascular:      Rate and Rhythm: Normal rate and regular rhythm.   Pulmonary:      Effort: Pulmonary effort is normal.   Abdominal:      General: Abdomen is flat. There is no distension.      Palpations: Abdomen is soft. There is no mass.      Tenderness: There is no guarding or rebound.      Hernia: No hernia is present.      Comments: Appropriately TTP, incision c/d/I- with bruising, KEVIN drain #1- serosang, KEVIN drain #2- serous    Musculoskeletal:         General: No swelling, tenderness, deformity or signs of injury. Normal range of motion.   Skin:     General: Skin is warm and dry.      Coloration: Skin is not jaundiced.   Neurological:      General: No focal deficit present.      Mental Status: She is alert and oriented to person, place, and time.   Psychiatric:         Mood and Affect: Mood normal.          Behavior: Behavior normal.         Thought Content: Thought content normal.          Significant Labs:  I have reviewed all pertinent lab results within the past 24 hours.  CBC:   Recent Labs   Lab 06/02/25  0527   WBC 9.71   RBC 3.26*   HGB 9.8*   HCT 29.4*      MCV 90   MCH 30.1   MCHC 33.3     BMP:   Recent Labs   Lab 06/02/25  0527      *   K 3.5      CO2 24   BUN 13   CREATININE 0.6   CALCIUM 7.8*   MG 2.2       Significant Diagnostics:  I have reviewed all pertinent imaging results/findings within the past 24 hours.  Assessment/Plan:     * Intraductal papillary mucinous neoplasm of pancreas  POD #4- s/p open whipple.     Doing ok.  No nausea/ vomiting, tolerating CLD.  Awaiting return of bowel function.  Amylases yesterday downtrending      -- advance to FLD today   -- KVO IVF  -- d/c anand today   -- continue Zosyn while panc leak   -- Morphine PCA for pain.  Holding home trazodone and flexeril.  Plan to d/c pca tomorrow   -- continue reglan IV   -- starting BID senna and MOM today   -- checking drain amylases tomorrow     -- encourage ambulation and IS  Dispo: continued med/surg     Lovenox and SCDs for DVT ppx, Famotidine for GI ppx        Urinary retention  Likely 2/2 opioids.  D/c again today     Hypertension  -- holding home Diovan-HCT  -- appreciate ICU assistance with management        Yolie Lieberman MD  General Surgery  [facility] ([facility])

## 2025-06-02 NOTE — ASSESSMENT & PLAN NOTE
Patient's blood pressure range in the last 24 hours was: BP  Min: 109/65  Max: 146/80.The patient's inpatient anti-hypertensive regimen is listed below:  Current Antihypertensives       Plan  - holding home medication  -resume valsartan as BP allows

## 2025-06-02 NOTE — PT/OT/SLP PROGRESS
"Occupational Therapy   Treatment    Name: Chel Thompson  MRN: [id number]  Admitting Diagnosis:  Intraductal papillary mucinous neoplasm of pancreas  4 Days Post-Op    Recommendations:     Discharge Recommendations: Low Intensity Therapy  Discharge Equipment Recommendations:  walker, rolling  Barriers to discharge:  None    Assessment:     Chel Thompson is a 64 y.o. female with a medical diagnosis of Intraductal papillary mucinous neoplasm of pancreas.  She presents with the following performance deficits affecting function are weakness, impaired endurance, impaired self care skills, impaired functional mobility, impaired balance, impaired cardiopulmonary response to activity, pain, decreased safety awareness, edema, decreased lower extremity function, impaired skin, impaired fine motor.     Rehab Prognosis:  Good; patient would benefit from acute skilled OT services to address these deficits and reach maximum level of function.       Plan:     Patient to be seen 2 x/week to address the above listed problems via self-care/home management, therapeutic activities, therapeutic exercises  Plan of Care Expires: 06/12/25  Plan of Care Reviewed with: patient    Subjective     Chief Complaint: Reported "I am doing better."  Patient/Family Comments/goals: increase independence  Pain/Comfort:  Pain Rating 1: 4/10  Location 1: abdomen  Pain Addressed 1:  (activity pacing and use of PCA as tolerated)    Objective:     Completed EPIC chart review prior to session.  Patient found up in chair with blood pressure cuff, pulse ox (continuous), telemetry, anand catheter, oxygen, peripheral IV, PCA upon OT entry to room.    General Precautions: Standard, fall    Orthopedic Precautions:N/A  Braces: N/A  Respiratory Status: Nasal cannula, flow 2 L/min     Occupational Performance:     OOB in chair at start and end of session    Functional Mobility/Transfers:  Patient completed Sit <> Stand Transfer with contact " guard assistance  with  rolling walker   Initial posterior instability improved with cueing and resolved for remainder of session  Patient completed Bed <> Chair Transfer using Step Transfer technique with stand by assistance with rolling walker  Functional Mobility: Patient completed x260ft functional mobility with RW and SBA to increase dynamic standing balance and activity tolerance needed for ADL completion.  V/c for technique with transfers and appropriate RW management  Activity pacing as needed    Activities of Daily Living:  Upper Body Dressing: setup donned gown as robe while seated in bedside chair    [facility] 6 Click ADL: 20    Treatment & Education:  Encouraged completion of B UE AROM therex throughout the day to increase functional strength and activity tolerance needed for ADL completion. Completed IS x10 reps with v/c for technique. Educated on benefits of OOB activity and importance of calling for A to transfer back to bed. Patient stated understanding and in agreement with POC.    Patient left up in chair with all lines intact, call button in reach, and chair alarm on    GOALS:   Multidisciplinary Problems       Occupational Therapy Goals          Problem: Occupational Therapy    Goal Priority Disciplines Outcome Interventions   Occupational Therapy Goal     OT, PT/OT Progressing    Description: Goals to be met by: 06/13/25    Patient will increase functional independence with ADLs by performing:    Toileting from toilet with Stand-by Assistance for hygiene and clothing management.   Step transfer with Stand-by Assistance.  Increased functional strength by 1/2 MMT grade.                       DME Justifications:   Chel's mobility limitation cannot be sufficiently resolved by the use of a cane. Her functional mobility deficit can be sufficiently resolved with the use of a Rolling Walker. Patient's mobility limitation significantly impairs their ability to participate in one of more activities of daily  living.  The use of a RW will significantly improve the patient's ability to participate in MRADLS and the patient will use it on regular basis in the home.    Time Tracking:     OT Date of Treatment: 06/02/25  OT Start Time: 1050  OT Stop Time: 1120  OT Total Time (min): 30 min    Billable Minutes:Therapeutic Activity 30    OT/DEIRDRE: OT     Number of DEIRDRE visits since last OT visit: 0    Jewell Prabhakar OT  6/2/2025

## 2025-06-02 NOTE — EICU
Virtual ICU Quality Rounds    Admit Date: 5/29/2025  Hospital Day: 4    ICU Day: 3d 11h    24H Vital Sign Range:  Temp:  [97.9 °F (36.6 °C)-98.1 °F (36.7 °C)]   Pulse:  []   Resp:  [14-29]   BP: (115-157)/(63-91)   SpO2:  [94 %-100 %]     VICU Surveillance Screening  Daily review of  line necessity(optional): Central line(s) in place and Urinary catheter in place  Central line type (optional): PICC  Central line indications : Poor IV access  Mckeon Indications : Urinary retention  LDA reconciliation : Yes  Central line best practices : Consider removal if patient has no central line indications for over 24hrs  Mckeon removal protocol ordered.

## 2025-06-02 NOTE — SUBJECTIVE & OBJECTIVE
Interval History: Follow up for Intraductal papillary mucinous neoplasm of pancreas.  She seen and examined sitting up in chair and states she is feel much better.  Plan for anand removal again today.  KEVIN drains X 2 in place.  Labs and vitals stable.    Review of Systems  Objective:     Vital Signs (Most Recent):  Temp: 98.3 °F (36.8 °C) (06/02/25 0705)  Pulse: 91 (06/02/25 1000)  Resp: 20 (06/02/25 1000)  BP: (!) 141/81 (06/02/25 1000)  SpO2: 98 % (06/02/25 1000) Vital Signs (24h Range):  Temp:  [97.9 °F (36.6 °C)-98.3 °F (36.8 °C)] 98.3 °F (36.8 °C)  Pulse:  [] 91  Resp:  [14-29] 20  SpO2:  [94 %-100 %] 98 %  BP: (115-157)/(63-84) 141/81     Weight: 101.2 kg (223 lb 1.7 oz)  Body mass index is 33.92 kg/m².    Intake/Output Summary (Last 24 hours) at 6/2/2025 1336  Last data filed at 6/2/2025 1000  Gross per 24 hour   Intake 986.03 ml   Output 965 ml   Net 21.03 ml         Physical Exam  Vitals and nursing note reviewed.   Constitutional:       Appearance: Normal appearance.   HENT:      Head: Normocephalic and atraumatic.      Nose: Nose normal.      Mouth/Throat:      Mouth: Mucous membranes are moist.   Eyes:      Extraocular Movements: Extraocular movements intact.      Conjunctiva/sclera: Conjunctivae normal.   Cardiovascular:      Rate and Rhythm: Normal rate and regular rhythm.      Pulses: Normal pulses.      Heart sounds: Normal heart sounds.   Pulmonary:      Effort: Pulmonary effort is normal.      Breath sounds: Normal breath sounds.   Abdominal:      General: Abdomen is flat.      Palpations: Abdomen is soft.      Comments: KEVIN drain x 2.  Drain #1 clear yellow outpatient, Drain #2 Light red outpatient.   Genitourinary:     Comments: Anand in place - clear yellow urine  Musculoskeletal:         General: Normal range of motion.      Cervical back: Normal range of motion and neck supple.   Skin:     General: Skin is warm.      Capillary Refill: Capillary refill takes less than 2 seconds.    Neurological:      Mental Status: She is alert and oriented to person, place, and time. Mental status is at baseline.   Psychiatric:         Mood and Affect: Mood normal.         Behavior: Behavior normal.               Significant Labs: All pertinent labs within the past 24 hours have been reviewed.  Recent Lab Results         06/02/25  0941   06/02/25  0613   06/02/25  0527        Albumin     2.3       ALP     68       ALT     83       Anion Gap     10       AST     38       BILIRUBIN TOTAL     0.9  Comment: For infants and newborns, interpretation of results should be based   on gestational age, weight and in agreement with clinical   observations.    Premature Infant recommended reference ranges:   0-24 hours:  <8.0 mg/dL   24-48 hours: <12.0 mg/dL   3-5 days:    <15.0 mg/dL   6-29 days:   <15.0 mg/dL       BUN     13       Calcium     7.8       Chloride     100       CO2     24       Creatinine     0.6       eGFR     >60  Comment: Estimated GFR calculated using the CKD-EPI creatinine (2021) equation.       Glucose     102       Hematocrit     29.4       Hemoglobin     9.8       Magnesium      2.2       MCH     30.1       MCHC     33.3       MCV     90       MPV     8.8       Phosphorus Level     2.5       Platelet Count     174       POCT Glucose 105   100         Potassium     3.5       PROTEIN TOTAL     5.0       RBC     3.26       RDW     13.1       Sodium     134       WBC     9.71               Significant Imaging: I have reviewed all pertinent imaging results/findings within the past 24 hours.

## 2025-06-03 PROBLEM — R33.9 URINARY RETENTION: Status: RESOLVED | Noted: 2025-05-31 | Resolved: 2025-06-03

## 2025-06-03 LAB
ALBUMIN SERPL BCP-MCNC: 2.3 G/DL (ref 3.5–5.2)
ALP SERPL-CCNC: 110 UNIT/L (ref 40–150)
ALT SERPL W/O P-5'-P-CCNC: 111 UNIT/L (ref 10–44)
AMYLASE FLD-CCNC: 105 U/L
ANION GAP (OHS): 10 MMOL/L (ref 8–16)
AST SERPL-CCNC: 68 UNIT/L (ref 11–45)
BILIRUB SERPL-MCNC: 1.6 MG/DL (ref 0.1–1)
BUN SERPL-MCNC: 18 MG/DL (ref 8–23)
CALCIUM SERPL-MCNC: 8.6 MG/DL (ref 8.7–10.5)
CHLORIDE SERPL-SCNC: 102 MMOL/L (ref 95–110)
CO2 SERPL-SCNC: 24 MMOL/L (ref 23–29)
CREAT SERPL-MCNC: 0.7 MG/DL (ref 0.5–1.4)
ERYTHROCYTE [DISTWIDTH] IN BLOOD BY AUTOMATED COUNT: 13.2 % (ref 11.5–14.5)
GFR SERPLBLD CREATININE-BSD FMLA CKD-EPI: >60 ML/MIN/1.73/M2
GLUCOSE SERPL-MCNC: 99 MG/DL (ref 70–110)
HCT VFR BLD AUTO: 32.5 % (ref 37–48.5)
HGB BLD-MCNC: 11 GM/DL (ref 12–16)
MAGNESIUM SERPL-MCNC: 2.8 MG/DL (ref 1.6–2.6)
MCH RBC QN AUTO: 30.4 PG (ref 27–31)
MCHC RBC AUTO-ENTMCNC: 33.8 G/DL (ref 32–36)
MCV RBC AUTO: 90 FL (ref 82–98)
PHOSPHATE SERPL-MCNC: 2.5 MG/DL (ref 2.7–4.5)
PLATELET # BLD AUTO: 256 K/UL (ref 150–450)
PLATELET BLD QL SMEAR: NORMAL
PMV BLD AUTO: 8.7 FL (ref 9.2–12.9)
POCT GLUCOSE: 101 MG/DL (ref 70–110)
POCT GLUCOSE: 101 MG/DL (ref 70–110)
POCT GLUCOSE: 85 MG/DL (ref 70–110)
POCT GLUCOSE: 98 MG/DL (ref 70–110)
POTASSIUM SERPL-SCNC: 3.9 MMOL/L (ref 3.5–5.1)
PROT SERPL-MCNC: 6.3 GM/DL (ref 6–8.4)
RBC # BLD AUTO: 3.62 M/UL (ref 4–5.4)
SODIUM SERPL-SCNC: 136 MMOL/L (ref 136–145)
WBC # BLD AUTO: 11.89 K/UL (ref 3.9–12.7)

## 2025-06-03 PROCEDURE — 25000003 PHARM REV CODE 250: Performed by: SURGERY

## 2025-06-03 PROCEDURE — 63600175 PHARM REV CODE 636 W HCPCS: Performed by: FAMILY MEDICINE

## 2025-06-03 PROCEDURE — 82150 ASSAY OF AMYLASE: CPT | Performed by: SURGERY

## 2025-06-03 PROCEDURE — 63600175 PHARM REV CODE 636 W HCPCS: Performed by: SURGERY

## 2025-06-03 PROCEDURE — 11000001 HC ACUTE MED/SURG PRIVATE ROOM

## 2025-06-03 PROCEDURE — 97110 THERAPEUTIC EXERCISES: CPT

## 2025-06-03 PROCEDURE — 97535 SELF CARE MNGMENT TRAINING: CPT

## 2025-06-03 PROCEDURE — 94761 N-INVAS EAR/PLS OXIMETRY MLT: CPT

## 2025-06-03 PROCEDURE — 97530 THERAPEUTIC ACTIVITIES: CPT

## 2025-06-03 PROCEDURE — 84100 ASSAY OF PHOSPHORUS: CPT | Performed by: SURGERY

## 2025-06-03 PROCEDURE — 97116 GAIT TRAINING THERAPY: CPT

## 2025-06-03 PROCEDURE — 83735 ASSAY OF MAGNESIUM: CPT | Performed by: SURGERY

## 2025-06-03 PROCEDURE — 85027 COMPLETE CBC AUTOMATED: CPT | Performed by: SURGERY

## 2025-06-03 PROCEDURE — 80053 COMPREHEN METABOLIC PANEL: CPT | Performed by: SURGERY

## 2025-06-03 RX ORDER — IBUPROFEN 400 MG/1
400 TABLET, FILM COATED ORAL
Status: DISCONTINUED | OUTPATIENT
Start: 2025-06-03 | End: 2025-06-05 | Stop reason: HOSPADM

## 2025-06-03 RX ORDER — SODIUM,POTASSIUM PHOSPHATES 280-250MG
2 POWDER IN PACKET (EA) ORAL
Status: COMPLETED | OUTPATIENT
Start: 2025-06-03 | End: 2025-06-03

## 2025-06-03 RX ORDER — MAGNESIUM SULFATE HEPTAHYDRATE 40 MG/ML
2 INJECTION, SOLUTION INTRAVENOUS ONCE
Status: DISCONTINUED | OUTPATIENT
Start: 2025-06-03 | End: 2025-06-03

## 2025-06-03 RX ORDER — OXYCODONE HYDROCHLORIDE 5 MG/1
10 TABLET ORAL EVERY 6 HOURS PRN
Refills: 0 | Status: DISCONTINUED | OUTPATIENT
Start: 2025-06-03 | End: 2025-06-05 | Stop reason: HOSPADM

## 2025-06-03 RX ORDER — ACETAMINOPHEN 500 MG
1000 TABLET ORAL EVERY 8 HOURS
Status: DISCONTINUED | OUTPATIENT
Start: 2025-06-03 | End: 2025-06-05 | Stop reason: HOSPADM

## 2025-06-03 RX ORDER — HYDRALAZINE HYDROCHLORIDE 20 MG/ML
10 INJECTION INTRAMUSCULAR; INTRAVENOUS EVERY 6 HOURS PRN
Status: DISCONTINUED | OUTPATIENT
Start: 2025-06-03 | End: 2025-06-05 | Stop reason: HOSPADM

## 2025-06-03 RX ORDER — VALSARTAN 160 MG/1
160 TABLET ORAL 2 TIMES DAILY
Status: DISCONTINUED | OUTPATIENT
Start: 2025-06-03 | End: 2025-06-05 | Stop reason: HOSPADM

## 2025-06-03 RX ORDER — HYDROCHLOROTHIAZIDE 12.5 MG/1
12.5 TABLET ORAL DAILY
Status: DISCONTINUED | OUTPATIENT
Start: 2025-06-03 | End: 2025-06-05 | Stop reason: HOSPADM

## 2025-06-03 RX ORDER — HYDROMORPHONE HYDROCHLORIDE 2 MG/ML
1 INJECTION, SOLUTION INTRAMUSCULAR; INTRAVENOUS; SUBCUTANEOUS EVERY 6 HOURS PRN
Refills: 0 | Status: DISCONTINUED | OUTPATIENT
Start: 2025-06-03 | End: 2025-06-05 | Stop reason: HOSPADM

## 2025-06-03 RX ORDER — VALSARTAN AND HYDROCHLOROTHIAZIDE 160; 12.5 MG/1; MG/1
1 TABLET, FILM COATED ORAL EVERY MORNING
Status: DISCONTINUED | OUTPATIENT
Start: 2025-06-04 | End: 2025-06-03

## 2025-06-03 RX ORDER — OXYCODONE HYDROCHLORIDE 5 MG/1
5 TABLET ORAL EVERY 4 HOURS PRN
Refills: 0 | Status: DISCONTINUED | OUTPATIENT
Start: 2025-06-03 | End: 2025-06-05 | Stop reason: HOSPADM

## 2025-06-03 RX ADMIN — Medication 200 ML: at 02:06

## 2025-06-03 RX ADMIN — Medication 2 PACKET: at 09:06

## 2025-06-03 RX ADMIN — ACETAMINOPHEN 1000 MG: 500 TABLET ORAL at 10:06

## 2025-06-03 RX ADMIN — ENOXAPARIN SODIUM 40 MG: 40 INJECTION SUBCUTANEOUS at 05:06

## 2025-06-03 RX ADMIN — METOCLOPRAMIDE 10 MG: 5 INJECTION, SOLUTION INTRAMUSCULAR; INTRAVENOUS at 04:06

## 2025-06-03 RX ADMIN — Medication 2 PACKET: at 03:06

## 2025-06-03 RX ADMIN — SENNOSIDES 8.6 MG: 8.6 TABLET, FILM COATED ORAL at 09:06

## 2025-06-03 RX ADMIN — PIPERACILLIN SODIUM AND TAZOBACTAM SODIUM 4.5 G: 4; .5 INJECTION, POWDER, FOR SOLUTION INTRAVENOUS at 07:06

## 2025-06-03 RX ADMIN — Medication 200 ML: at 06:06

## 2025-06-03 RX ADMIN — METOCLOPRAMIDE 10 MG: 5 INJECTION, SOLUTION INTRAMUSCULAR; INTRAVENOUS at 11:06

## 2025-06-03 RX ADMIN — FAMOTIDINE 20 MG: 20 TABLET, FILM COATED ORAL at 09:06

## 2025-06-03 RX ADMIN — HYDRALAZINE HYDROCHLORIDE 10 MG: 20 INJECTION INTRAMUSCULAR; INTRAVENOUS at 03:06

## 2025-06-03 RX ADMIN — MUPIROCIN: 20 OINTMENT TOPICAL at 09:06

## 2025-06-03 RX ADMIN — OXYCODONE 5 MG: 5 TABLET ORAL at 03:06

## 2025-06-03 RX ADMIN — IBUPROFEN 400 MG: 400 TABLET ORAL at 05:06

## 2025-06-03 RX ADMIN — FAMOTIDINE 20 MG: 20 TABLET, FILM COATED ORAL at 10:06

## 2025-06-03 RX ADMIN — IBUPROFEN 400 MG: 400 TABLET ORAL at 09:06

## 2025-06-03 RX ADMIN — METOCLOPRAMIDE 10 MG: 5 INJECTION, SOLUTION INTRAMUSCULAR; INTRAVENOUS at 12:06

## 2025-06-03 RX ADMIN — SENNOSIDES 8.6 MG: 8.6 TABLET, FILM COATED ORAL at 10:06

## 2025-06-03 RX ADMIN — PIPERACILLIN SODIUM AND TAZOBACTAM SODIUM 4.5 G: 4; .5 INJECTION, POWDER, FOR SOLUTION INTRAVENOUS at 02:06

## 2025-06-03 RX ADMIN — HYDROCHLOROTHIAZIDE 12.5 MG: 12.5 TABLET ORAL at 03:06

## 2025-06-03 RX ADMIN — PIPERACILLIN SODIUM AND TAZOBACTAM SODIUM 4.5 G: 4; .5 INJECTION, POWDER, FOR SOLUTION INTRAVENOUS at 11:06

## 2025-06-03 RX ADMIN — ACETAMINOPHEN 1000 MG: 500 TABLET ORAL at 02:06

## 2025-06-03 RX ADMIN — Medication 2 PACKET: at 10:06

## 2025-06-03 RX ADMIN — METOCLOPRAMIDE 10 MG: 5 INJECTION, SOLUTION INTRAMUSCULAR; INTRAVENOUS at 06:06

## 2025-06-03 RX ADMIN — Medication 200 ML: at 10:06

## 2025-06-03 RX ADMIN — VALSARTAN 160 MG: 160 TABLET, FILM COATED ORAL at 10:06

## 2025-06-03 NOTE — SUBJECTIVE & OBJECTIVE
Interval History: AFVSS.  KELLEY.  Doing well.  Had BM this AM.      Medications:  Continuous Infusions:  Scheduled Meds:   acetaminophen  1,000 mg Oral Q8H    chlorhexidine  10 mL Mouth/Throat BID    electrolytes-dextrose  200 mL Oral Q4H    enoxparin  40 mg Subcutaneous Q24H (prophylaxis, 1700)    famotidine  20 mg Oral BID    ibuprofen  400 mg Oral Q8H    metoclopramide  10 mg Intravenous Q6H    mupirocin   Nasal BID    piperacillin-tazobactam (Zosyn) IV (PEDS and ADULTS) (extended infusion is not appropriate)  4.5 g Intravenous Q8H    senna  8.6 mg Oral BID     PRN Meds:  Current Facility-Administered Medications:     dextrose 50%, 12.5 g, Intravenous, PRN    glucagon (human recombinant), 1 mg, Intramuscular, PRN    haloperidol lactate, 5 mg, Intravenous, Q6H PRN    hydrALAZINE, 10 mg, Intravenous, Q6H PRN    HYDROmorphone, 1 mg, Intravenous, Q6H PRN    insulin aspart U-100, 0-5 Units, Subcutaneous, Q6H PRN    magnesium sulfate 2 g IVPB, 2 g, Intravenous, PRN    magnesium sulfate 2 g IVPB, 2 g, Intravenous, PRN    naloxone, 0.02 mg, Intravenous, PRN    ondansetron, 4 mg, Intravenous, Q6H PRN    oxyCODONE, 10 mg, Oral, Q6H PRN    oxyCODONE, 5 mg, Oral, Q4H PRN    pneumoc 20-rajinder conj-dip cr(PF), 0.5 mL, Intramuscular, Prior to discharge    potassium chloride in water, 40 mEq, Intravenous, PRN **AND** potassium chloride in water, 60 mEq, Intravenous, PRN **AND** potassium chloride in water, 80 mEq, Intravenous, PRN     Review of patient's allergies indicates:   Allergen Reactions    Dilaudid [hydromorphone] Itching     From IV    Nitrofurantoin Other (See Comments)     Cramping of legs     Objective:     Vital Signs (Most Recent):  Temp: 97.9 °F (36.6 °C) (06/03/25 0715)  Pulse: 103 (06/03/25 0830)  Resp: (!) 29 (06/03/25 0830)  BP: 137/83 (06/03/25 0800)  SpO2: 96 % (06/03/25 0830) Vital Signs (24h Range):  Temp:  [97.4 °F (36.3 °C)-98.4 °F (36.9 °C)] 97.9 °F (36.6 °C)  Pulse:  [] 103  Resp:  [6-29] 29  SpO2:   [94 %-100 %] 96 %  BP: (128-177)/() 137/83     Weight: 87.1 kg (192 lb 0.3 oz)  Body mass index is 29.2 kg/m².    Intake/Output - Last 3 Shifts         06/01 0700  06/02 0659 06/02 0700  06/03 0659 06/03 0700  06/04 0659    P.O. 480 200     I.V. (mL/kg) 405.6 (4) 392.2 (4.5)     NG/GT       IV Piggyback 249.8 554.1     Total Intake(mL/kg) 1135.4 (11.2) 1146.3 (13.2)     Urine (mL/kg/hr) 825 (0.3) 360 (0.2)     Drains 175 180     Stool  0     Total Output 1000 540     Net +135.4 +606.3            Unmeasured Urine Occurrence  1 x     Unmeasured Stool Occurrence  1 x              Physical Exam  Vitals and nursing note reviewed.   Constitutional:       General: She is not in acute distress.     Appearance: Normal appearance.   HENT:      Head: Normocephalic and atraumatic.   Eyes:      Extraocular Movements: Extraocular movements intact.      Conjunctiva/sclera: Conjunctivae normal.   Cardiovascular:      Rate and Rhythm: Normal rate and regular rhythm.   Pulmonary:      Effort: Pulmonary effort is normal.   Abdominal:      General: Abdomen is flat. There is no distension.      Palpations: Abdomen is soft. There is no mass.      Tenderness: There is no guarding or rebound.      Hernia: No hernia is present.      Comments: Appropriately TTP, incision c/d/I- with bruising, KEVIN drain #1- serosang, KEVIN drain #2- serous    Musculoskeletal:         General: No swelling, tenderness, deformity or signs of injury. Normal range of motion.   Skin:     General: Skin is warm and dry.      Coloration: Skin is not jaundiced.   Neurological:      General: No focal deficit present.      Mental Status: She is alert and oriented to person, place, and time.   Psychiatric:         Mood and Affect: Mood normal.         Behavior: Behavior normal.         Thought Content: Thought content normal.          Significant Labs:  I have reviewed all pertinent lab results within the past 24 hours.  CBC:   Recent Labs   Lab 06/03/25  0339   WBC  11.89   RBC 3.62*   HGB 11.0*   HCT 32.5*      MCV 90   MCH 30.4   MCHC 33.8     BMP:   Recent Labs   Lab 06/03/25  0339   GLU 99      K 3.9      CO2 24   BUN 18   CREATININE 0.7   CALCIUM 8.6*   MG 2.8*       Significant Diagnostics:  I have reviewed all pertinent imaging results/findings within the past 24 hours.

## 2025-06-03 NOTE — PT/OT/SLP PROGRESS
Occupational Therapy   Treatment    Name: Chel Thompson  MRN: [id number]  Admitting Diagnosis:  Intraductal papillary mucinous neoplasm of pancreas  5 Days Post-Op    Recommendations:     Discharge Recommendations: Low Intensity Therapy  Discharge Equipment Recommendations:  walker, rolling  Barriers to discharge:  None    Assessment:     Chel Thompson is a 64 y.o. female with a medical diagnosis of Intraductal papillary mucinous neoplasm of pancreas.  She presents with performance deficits affecting function are weakness, impaired endurance, impaired self care skills, impaired functional mobility, impaired balance, decreased safety awareness, decreased coordination, decreased lower extremity function.     Rehab Prognosis:  Good; patient would benefit from acute skilled OT services to address these deficits and reach maximum level of function.       Plan:     Patient to be seen 2 x/week to address the above listed problems via self-care/home management, therapeutic activities, therapeutic exercises  Plan of Care Expires: 06/12/25  Plan of Care Reviewed with: patient    Subjective     Chief Complaint: Pt states 2/10 pain in her LUQ this AM. Overall, pt highly motivated during session to increase independence and to continue UE ROM HEP.   Patient/Family Comments/goals: None reported.  Pain/Comfort:  Pain Rating 1: 2/10  Location - Side 1: Left  Location - Orientation 1: upper (LUQ)  Pain Addressed 1: Other (see comments) (pacing and using PCA as needed.)    Objective:     EPIC chart review completed prior to session.  Patient found HOB elevated with pulse ox (continuous), telemetry, KEVIN drain, PCA, peripheral IV, blood pressure cuff upon OT entry to room.    General Precautions: Standard, fall    Orthopedic Precautions:N/A  Braces: N/A  Respiratory Status: Room air; Pt had nasal cannula on but was stated she had not been wearing it.      Occupational Performance:   Bed Mobility:    Patient  completed Rolling/Turning to Right with stand by assistance  Patient completed Scooting/Bridging with stand by assistance  Patient completed Supine to Sit with stand by assistance     Functional Mobility/Transfers:  Patient completed Sit <> Stand Transfer with stand by assistance  with  no assistive device   Patient completed Bed <> Chair Transfer using Step Transfer technique with stand by assistance with no assistive device  Functional Mobility: Pt ambulated 300' SBA /c no AE. Functional mobility completed to increased ADL completion and activity tolerance. Pt completed standing 5 min static standing tic-tac-toe game on ICU wall SBA to promote upright posture, midline crossing, weight shifting, and activity tolerance.     Activities of Daily Living:  Grooming: stand by assistance Pt completed teeth brushing, mouthwash, and facial wiping standing at sink.      [facility] 6 Click ADL: 20    Treatment & Education:  Pt educated on roles and responsibilities of OT. Pt educated on importance of OOB activity and ROM exercises for continued strengthening and mobility. Pt completed 1x10 RTB UE exercises seated in chair (chest pull, tricep ext, and elbow ext). Pt educated on call don't fall for safety. Pt agreeable to POC.    Patient left up in chair with all lines intact, call button in reach, and chair alarm on    GOALS:   Multidisciplinary Problems       Occupational Therapy Goals          Problem: Occupational Therapy    Goal Priority Disciplines Outcome Interventions   Occupational Therapy Goal     OT, PT/OT Progressing    Description: Goals to be met by: 06/13/25    Patient will increase functional independence with ADLs by performing:    Toileting from toilet with Stand-by Assistance for hygiene and clothing management.   Step transfer with Stand-by Assistance.  Increased functional strength by 1/2 MMT grade.                         DME Justifications:   Chel's mobility limitation cannot be sufficiently resolved by the  use of a cane. Her functional mobility deficit can be sufficiently resolved with the use of a Rolling Walker. Patient's mobility limitation significantly impairs their ability to participate in one of more activities of daily living.  The use of a RW will significantly improve the patient's ability to participate in MRADLS and the patient will use it on regular basis in the home.    Time Tracking:     OT Date of Treatment: 06/03/25  OT Start Time: 0800  OT Stop Time: 0840  OT Total Time (min): 40 min    Billable Minutes:Self Care/Home Management 15  Therapeutic Activity 15  Therapeutic Exercise 10    OT/DEIRDRE: OT     Number of DEIRDRE visits since last OT visit: 0    MARISOL Alvarado  6/3/2025

## 2025-06-03 NOTE — PLAN OF CARE
Pt tolerated therapy well. Completed all t/fs and ambulation SBA. Recommending low intensity therapy intervention at d/c.

## 2025-06-03 NOTE — PLAN OF CARE
Patient remains stable for duration of shift. VSS, NSR on monitor. AAO x 4. Afebrile. 2 L NC with PCA pump. 1 BM this shift, voided in toilet this AM. Blood glucose monitored Q6H, no coverage needed this shift. Ambulated in room with stand-by assistance. POC reviewed with patient, demonstrated understanding. Currently resting in bed with safety & fall prevention measures in place. Call light in reach. Care ongoing.

## 2025-06-03 NOTE — NURSING
Escorted pt via wheelchair to room 512 in no distress.  Restarted Zosyn on pump in room and handed call heard to patient.  Jesús at bedside obtaining vitals.

## 2025-06-03 NOTE — PROGRESS NOTES
Formerly [facility], later [facility] - Intensive Care ([facility])  General Surgery  Progress Note    Subjective:     History of Present Illness:  No notes on file    Post-Op Info:  Procedure(s) (LRB):  WHIPPLE PROCEDURE (N/A)   5 Days Post-Op     Interval History: AFVSS.  KELLEY.  Doing well.  Had BM this AM.      Medications:  Continuous Infusions:  Scheduled Meds:   acetaminophen  1,000 mg Oral Q8H    chlorhexidine  10 mL Mouth/Throat BID    electrolytes-dextrose  200 mL Oral Q4H    enoxparin  40 mg Subcutaneous Q24H (prophylaxis, 1700)    famotidine  20 mg Oral BID    ibuprofen  400 mg Oral Q8H    metoclopramide  10 mg Intravenous Q6H    mupirocin   Nasal BID    piperacillin-tazobactam (Zosyn) IV (PEDS and ADULTS) (extended infusion is not appropriate)  4.5 g Intravenous Q8H    senna  8.6 mg Oral BID     PRN Meds:  Current Facility-Administered Medications:     dextrose 50%, 12.5 g, Intravenous, PRN    glucagon (human recombinant), 1 mg, Intramuscular, PRN    haloperidol lactate, 5 mg, Intravenous, Q6H PRN    hydrALAZINE, 10 mg, Intravenous, Q6H PRN    HYDROmorphone, 1 mg, Intravenous, Q6H PRN    insulin aspart U-100, 0-5 Units, Subcutaneous, Q6H PRN    magnesium sulfate 2 g IVPB, 2 g, Intravenous, PRN    magnesium sulfate 2 g IVPB, 2 g, Intravenous, PRN    naloxone, 0.02 mg, Intravenous, PRN    ondansetron, 4 mg, Intravenous, Q6H PRN    oxyCODONE, 10 mg, Oral, Q6H PRN    oxyCODONE, 5 mg, Oral, Q4H PRN    pneumoc 20-rajinder conj-dip cr(PF), 0.5 mL, Intramuscular, Prior to discharge    potassium chloride in water, 40 mEq, Intravenous, PRN **AND** potassium chloride in water, 60 mEq, Intravenous, PRN **AND** potassium chloride in water, 80 mEq, Intravenous, PRN     Review of patient's allergies indicates:   Allergen Reactions    Dilaudid [hydromorphone] Itching     From IV    Nitrofurantoin Other (See Comments)     Cramping of legs     Objective:     Vital Signs (Most Recent):  Temp: 97.9 °F (36.6 °C) (06/03/25 0715)  Pulse: 103 (06/03/25 0830)  Resp:  (!) 29 (06/03/25 0830)  BP: 137/83 (06/03/25 0800)  SpO2: 96 % (06/03/25 0830) Vital Signs (24h Range):  Temp:  [97.4 °F (36.3 °C)-98.4 °F (36.9 °C)] 97.9 °F (36.6 °C)  Pulse:  [] 103  Resp:  [6-29] 29  SpO2:  [94 %-100 %] 96 %  BP: (128-177)/() 137/83     Weight: 87.1 kg (192 lb 0.3 oz)  Body mass index is 29.2 kg/m².    Intake/Output - Last 3 Shifts         06/01 0700 06/02 0659 06/02 0700 06/03 0659 06/03 0700  06/04 0659    P.O. 480 200     I.V. (mL/kg) 405.6 (4) 392.2 (4.5)     NG/GT       IV Piggyback 249.8 554.1     Total Intake(mL/kg) 1135.4 (11.2) 1146.3 (13.2)     Urine (mL/kg/hr) 825 (0.3) 360 (0.2)     Drains 175 180     Stool  0     Total Output 1000 540     Net +135.4 +606.3            Unmeasured Urine Occurrence  1 x     Unmeasured Stool Occurrence  1 x              Physical Exam  Vitals and nursing note reviewed.   Constitutional:       General: She is not in acute distress.     Appearance: Normal appearance.   HENT:      Head: Normocephalic and atraumatic.   Eyes:      Extraocular Movements: Extraocular movements intact.      Conjunctiva/sclera: Conjunctivae normal.   Cardiovascular:      Rate and Rhythm: Normal rate and regular rhythm.   Pulmonary:      Effort: Pulmonary effort is normal.   Abdominal:      General: Abdomen is flat. There is no distension.      Palpations: Abdomen is soft. There is no mass.      Tenderness: There is no guarding or rebound.      Hernia: No hernia is present.      Comments: Appropriately TTP, incision c/d/I- with bruising, KEVIN drain #1- serosang, KEVIN drain #2- serous    Musculoskeletal:         General: No swelling, tenderness, deformity or signs of injury. Normal range of motion.   Skin:     General: Skin is warm and dry.      Coloration: Skin is not jaundiced.   Neurological:      General: No focal deficit present.      Mental Status: She is alert and oriented to person, place, and time.   Psychiatric:         Mood and Affect: Mood normal.          Behavior: Behavior normal.         Thought Content: Thought content normal.          Significant Labs:  I have reviewed all pertinent lab results within the past 24 hours.  CBC:   Recent Labs   Lab 06/03/25  0339   WBC 11.89   RBC 3.62*   HGB 11.0*   HCT 32.5*      MCV 90   MCH 30.4   MCHC 33.8     BMP:   Recent Labs   Lab 06/03/25  0339   GLU 99      K 3.9      CO2 24   BUN 18   CREATININE 0.7   CALCIUM 8.6*   MG 2.8*       Significant Diagnostics:  I have reviewed all pertinent imaging results/findings within the past 24 hours.  Assessment/Plan:     * Intraductal papillary mucinous neoplasm of pancreas  POD #5- s/p open whipple.     Doing well.  Had BM this AM.      -- low fat diet  -- d/c PCA.  Starting on PO tylenol, ibuprofen, and oxycodone.  Holding home trazodone and flexeril.   -- continue Zosyn while panc leak   -- continue reglan IV   -- continue BID senna and MOM today   -- checking drain amylases today     -- encourage ambulation and IS  Dispo: continued med/surg     Lovenox and SCDs for DVT ppx, Famotidine for GI ppx        Hypertension  -- holding home Diovan-HCT  -- appreciate ICU assistance with management        Yolie Lieberman MD  General Surgery  [facility] - Intensive Care ([facility])

## 2025-06-03 NOTE — EICU
Virtual ICU Quality Rounds    Admit Date: 5/29/2025  Hospital Day: 5    ICU Day: 4d 12h    24H Vital Sign Range:  Temp:  [97.4 °F (36.3 °C)-98.4 °F (36.9 °C)]   Pulse:  []   Resp:  [6-29]   BP: (128-177)/()   SpO2:  [94 %-100 %]     VICU Surveillance Screening  Daily review of  line necessity(optional): Central line(s) in place  Central line type (optional): PICC  Central line indications : Poor IV access  LDA reconciliation : Yes  Central line best practices : Consider removal if patient has no central line indications for over 24hrs

## 2025-06-03 NOTE — EICU
Intervention Initiated From:  COR / MARITZAU    Brook intervened regarding:  Rounding (Video assessment)  VICU Night Rounds Checklist  24H Vital Sign Range:  Temp:  [97.8 °F (36.6 °C)-98.4 °F (36.9 °C)]   Pulse:  []   Resp:  [6-21]   BP: (126-177)/()   SpO2:  [98 %-100 %]     Video rounds

## 2025-06-03 NOTE — PT/OT/SLP PROGRESS
Physical Therapy Treatment    Patient Name:  Chel Thompson   MRN:  [id number]    Recommendations:     Discharge Recommendations: Low Intensity Therapy  Discharge Equipment Recommendations: walker, rolling  Barriers to discharge: None    Assessment:     Chel Thompson is a 64 y.o. female admitted with a medical diagnosis of Intraductal papillary mucinous neoplasm of pancreas.  She presents with the following impairments/functional limitations: weakness, impaired endurance, impaired functional mobility, impaired coordination, impaired balance, decreased safety awareness, gait instability.    Rehab Prognosis: Good; patient would benefit from acute skilled PT services to address these deficits and reach maximum level of function.    Recent Surgery: Procedure(s) (LRB):  WHIPPLE PROCEDURE (N/A) 5 Days Post-Op    Plan:     During this hospitalization, patient to be seen 3 x/week to address the identified rehab impairments via gait training, therapeutic activities, therapeutic exercises and progress toward the following goals:    Plan of Care Expires:  06/13/25    Subjective     Chief Complaint: REPORTS MILD LUQ TIGHTNESS  Patient/Family Comments/goals: EAGER TO PARTICIPATE  Pain/Comfort:  Pain Rating 1: 2/10  Location 1: abdomen  Pain Addressed 1: Reposition (ACTIVITY PACING)      Objective:     Communicated with NURSE CARTWRIGHT prior to session.  Patient found supine with telemetry, oxygen, KEVIN drain, pulse ox (continuous), blood pressure cuff, PCA, peripheral IV upon PT entry to room.     General Precautions: Standard, fall  Orthopedic Precautions: N/A  Braces: N/A  Respiratory Status: Nasal cannula, flow 2 L/min - NOT DONNED BUT 97% ON ROOM AIR     Functional Mobility:  Bed Mobility:     Rolling Right: stand by assistance  Scooting: stand by assistance  Supine to Sit: stand by assistance-ENCOURAGED LOG ROLLING FOR BED MOBILITY  Transfers:     Sit to Stand:  stand by assistance with rolling walker  Bed  "to Chair: stand by assistance with  rolling walker  using  Step Transfer  Gait: PT ' WITH RW AND SBA, NO LOB OR SOB ON ROOM AIR  Balance: GOOD SITTING BALANCE, FAIR+ DYNAMIC BALANCE DURING GAIT  PT TOLERATED STATIC/DYNAMIC STAND WITH SBA FOR APPROX. 5 MINUTES DURING DYNAMIC TASK, NO LOB   PT TOLERATED STATIC STAND AT SINK WITH SBA FOR APPROX. 4 MINUTES FOR G/H TASKS  PT EDUCATED IN AND PERFORMED SEATED BLE THEREX X 10 REPS AROM WITH REST AS NEEDED: HIP FLEX/EXT, LAQ, QUAD SET, HEEL SLIDES, AP'S    AM-PAC 6 CLICK MOBILITY  Turning over in bed (including adjusting bedclothes, sheets and blankets)?: 4  Sitting down on and standing up from a chair with arms (e.g., wheelchair, bedside commode, etc.): 4  Moving from lying on back to sitting on the side of the bed?: 4  Moving to and from a bed to a chair (including a wheelchair)?: 4  Need to walk in hospital room?: 4  Climbing 3-5 steps with a railing?: 1  Basic Mobility Total Score: 21     Treatment & Education:  PT EDUCATION:  - ROLE OF P.T. AND POC IN ACUTE CARE HOSPITAL SETTING  - RW USE AND SAFETY DURING TF'S AND GAIT  - ENCOURAGED TO INCREASE TIME OOB IN CHAIR TO TOLERANCE   - TO CONTINUE THERAPUETIC EXERCISES THROUGHOUT THE DAY TO INCREASE ACTIVITY TOLERANCE AND DECREASE RISK FOR PNEUMONIA AND BLOOD CLOTS: HIP FLEX/EXT, HIP ABD/ADD, QUAD SET, HEEL SLIDE, AP  - RISK FOR FALLS DUE TO GENERALIZED WEAKNESS, EDUCATED ON "CALL DON'T FALL", ENCOURAGED TO CALL FOR ASSISTANCE WITH ALL NEEDS SUCH AS BED<>CHAIR TRANSFERS OR TRIPS TO BATHROOM, PT AGREEABLE TO SAFETY PRECAUTIONS    Patient left up in chair with all lines intact, call button in reach, chair alarm on, and NURSE notified..    GOALS:   Multidisciplinary Problems       Physical Therapy Goals          Problem: Physical Therapy    Goal Priority Disciplines Outcome Interventions   Physical Therapy Goal     PT, PT/OT Progressing    Description: LTG'S TO BE MET IN 14 DAYS (6-13-25)  PT WILL BE JANETTE FOR BED " MOBILITY  PT WILL BE JANETTE FOR BED<>CHAIR TF'S  PT WILL  FEET WITH OR WITHOUT RW AND JANETTE  PT WILL INC AMPAC SCORE BY 2 POINTS TO PROGRESS GROSS FUNC MOBILITY                         DME Justifications:   Chel's mobility limitation cannot be sufficiently resolved by the use of a cane. Her functional mobility deficit can be sufficiently resolved with the use of a Rolling Walker. Patient's mobility limitation significantly impairs their ability to participate in one of more activities of daily living.  The use of a RW will significantly improve the patient's ability to participate in MRADLS and the patient will use it on regular basis in the home.    Time Tracking:     PT Received On: 06/03/25  PT Start Time: 0750     PT Stop Time: 0830  PT Total Time (min): 40 min     Billable Minutes: Gait Training 10, Therapeutic Activity 20, and Therapeutic Exercise 10    Treatment Type: Treatment  PT/PTA: PT     Number of PTA visits since last PT visit: 0     06/03/2025

## 2025-06-03 NOTE — PROGRESS NOTES
[facility] ([facility])  Hospital Medicine  Progress Note    Patient Name: Chel Thompson  MRN: [id number]  Patient Class: IP- Inpatient   Admission Date: 5/29/2025  Length of Stay: 5 days  Attending Physician: Yolie Lieberman MD  Primary Care Provider: Yesica Burk MD        Subjective     Principal Problem:Intraductal papillary mucinous neoplasm of pancreas        HPI:  Ms. Thompson is a 64-year-old female history of hypertension who presented for a Whipple procedure for IPMN.  Patient has a history of having multiple episodes of pancreatitis.  Patient had an MRI of her spine for pain but also showed multiocular cystic mass at the head of the pancreas.  Patient then had a MRI MRCP of the abdomen in November.  This showed dilation of the main pancreatic duct.  Patient is now in PACU status post procedure.  ICU consulted for ICU management and monitoring postop.  Patient is currently drowsy and unable to provide a history.  Vital signs are stable.  She does have 2 KEVIN drains.     Hospital/ICU Course:  5/30 no acute events  No complaints   05/31/2025: Continued pain, using PCA. Up to chair this morning. Encouraging IS/ambulation. Urinary retention requiring straight cath overnight. Cont ICU care.  06/01/2025: Episode of agitation overnight, possible ICU delirium, received Haldol. Back to baseline this morning, calm. Stable to SD from an ICU standpoint.   Hospital medicine consulted.     Overview/Hospital Course:  No notes on file    Interval History:  Follow up for IPMN of the pancreas.  Patient doing very well this morning sitting up in the chair.  Her PCA pump was stopped and patient was started on scheduled anti-inflammatories.  KEVIN drain x2 remains in place.  The patient has voided and had a bowel movement after removal of Mckeon catheter.  She is noted to have an elevation in LFTs which will be monitored closely.  Review of Systems  Objective:     Vital Signs (Most Recent):  Temp: 97.9 °F (36.6  °C) (06/03/25 0715)  Pulse: 86 (06/03/25 1300)  Resp: (!) 23 (06/03/25 1300)  BP: (!) 175/84 (06/03/25 1300)  SpO2: 96 % (06/03/25 1300) Vital Signs (24h Range):  Temp:  [97.4 °F (36.3 °C)-98.3 °F (36.8 °C)] 97.9 °F (36.6 °C)  Pulse:  [] 86  Resp:  [6-29] 23  SpO2:  [94 %-100 %] 96 %  BP: (128-175)/(69-96) 175/84     Weight: 87.1 kg (192 lb 0.3 oz)  Body mass index is 29.2 kg/m².    Intake/Output Summary (Last 24 hours) at 6/3/2025 1429  Last data filed at 6/3/2025 1402  Gross per 24 hour   Intake 699.65 ml   Output 340 ml   Net 359.65 ml         Physical Exam  Vitals and nursing note reviewed.   Constitutional:       Appearance: Normal appearance.   HENT:      Head: Normocephalic and atraumatic.      Nose: Nose normal.      Mouth/Throat:      Mouth: Mucous membranes are moist.   Eyes:      Extraocular Movements: Extraocular movements intact.      Conjunctiva/sclera: Conjunctivae normal.   Cardiovascular:      Rate and Rhythm: Normal rate and regular rhythm.      Pulses: Normal pulses.      Heart sounds: Normal heart sounds.   Pulmonary:      Effort: Pulmonary effort is normal.      Breath sounds: Normal breath sounds.   Abdominal:      General: Abdomen is flat.      Palpations: Abdomen is soft.      Comments: KEVIN drain x 2.  Minimal output in either drain   Musculoskeletal:         General: Normal range of motion.      Cervical back: Normal range of motion and neck supple.   Skin:     General: Skin is warm.      Capillary Refill: Capillary refill takes less than 2 seconds.   Neurological:      Mental Status: She is alert and oriented to person, place, and time. Mental status is at baseline.   Psychiatric:         Mood and Affect: Mood normal.         Behavior: Behavior normal.               Significant Labs: All pertinent labs within the past 24 hours have been reviewed.  Recent Lab Results         06/03/25  0752   06/03/25  0339   06/03/25  0338   06/02/25  2125        Albumin   2.3           ALP   110            ALT   111           Anion Gap   10           AST   68           BILIRUBIN TOTAL   1.6  Comment: For infants and newborns, interpretation of results should be based   on gestational age, weight and in agreement with clinical   observations.    Premature Infant recommended reference ranges:   0-24 hours:  <8.0 mg/dL   24-48 hours: <12.0 mg/dL   3-5 days:    <15.0 mg/dL   6-29 days:   <15.0 mg/dL           BUN   18           Calcium   8.6           Chloride   102           CO2   24           Creatinine   0.7           eGFR   >60  Comment: Estimated GFR calculated using the CKD-EPI creatinine (2021) equation.           Glucose   99           Hematocrit   32.5           Hemoglobin   11.0           Magnesium    2.8           MCH   30.4           MCHC   33.8           MCV   90           MPV   8.7           Phosphorus Level   2.5           Platelet Estimate   Appears Normal           Platelet Count   256           POCT Glucose 101     101   82       Potassium   3.9           PROTEIN TOTAL   6.3           RBC   3.62           RDW   13.2           Sodium   136           WBC   11.89                   Significant Imaging: I have reviewed all pertinent imaging results/findings within the past 24 hours.      Assessment & Plan  Intraductal papillary mucinous neoplasm of pancreas  -s/p Whipple (POD #5)  -per primary team  -monitor KEVIN drain x 2  -Mckeon removal 06/02/2025  -patient is urinating and has had a bowel movement    Hypertension  Patient's blood pressure range in the last 24 hours was: BP  Min: 128/74  Max: 175/84.The patient's inpatient anti-hypertensive regimen is listed below:  Current Antihypertensives  hydrALAZINE injection 10 mg, Every 6 hours PRN, Intravenous  valsartan-hydrochlorothiazide 160-12.5 mg per tablet 1 tablet, Every morning, Oral    Plan  - uncontrolled  - we will restart patient's home valsartan-HCTZ  -continue hydralazine p.r.n.  H/O acute pancreatitis      Transaminitis  -worsening  -avoid hepatoxic  medications  -close monitoring    VTE Risk Mitigation (From admission, onward)           Ordered     enoxaparin injection 40 mg  Every 24 hours         05/31/25 1112     Place sequential compression device  Until discontinued         05/29/25 1746                    Discharge Planning   DAVIDA: 6/5/2025     Code Status: Full Code   Medical Readiness for Discharge Date:   Discharge Plan A: Home Health            Please place Justification for DME        Gary Dubose MD  Department of Hospital Medicine   [facility] ([facility])

## 2025-06-03 NOTE — PLAN OF CARE
Discussed poc with pt, pt verbalized understanding    Purposeful rounding every 2hours    VS wnl  Cardiac monitoring in use, pt is NSR, tele monitor # [id number]  Blood glucose monitoring   Fall precautions in place, remains injury free  Pt denies c/o pain and nausea at this time  Pain and nausea under control with PRN meds    Accurate I&Os  Abx given as prescribed  Bed locked at lowest position  Call light within reach    Chart check complete  Will cont with POC

## 2025-06-03 NOTE — SUBJECTIVE & OBJECTIVE
Interval History:  Follow up for IPMN of the pancreas.  Patient doing very well this morning sitting up in the chair.  Her PCA pump was stopped and patient was started on scheduled anti-inflammatories.  KEVIN drain x2 remains in place.  The patient has voided and had a bowel movement after removal of Mckeon catheter.  She is noted to have an elevation in LFTs which will be monitored closely.  Review of Systems  Objective:     Vital Signs (Most Recent):  Temp: 97.9 °F (36.6 °C) (06/03/25 0715)  Pulse: 86 (06/03/25 1300)  Resp: (!) 23 (06/03/25 1300)  BP: (!) 175/84 (06/03/25 1300)  SpO2: 96 % (06/03/25 1300) Vital Signs (24h Range):  Temp:  [97.4 °F (36.3 °C)-98.3 °F (36.8 °C)] 97.9 °F (36.6 °C)  Pulse:  [] 86  Resp:  [6-29] 23  SpO2:  [94 %-100 %] 96 %  BP: (128-175)/(69-96) 175/84     Weight: 87.1 kg (192 lb 0.3 oz)  Body mass index is 29.2 kg/m².    Intake/Output Summary (Last 24 hours) at 6/3/2025 1429  Last data filed at 6/3/2025 1402  Gross per 24 hour   Intake 699.65 ml   Output 340 ml   Net 359.65 ml         Physical Exam  Vitals and nursing note reviewed.   Constitutional:       Appearance: Normal appearance.   HENT:      Head: Normocephalic and atraumatic.      Nose: Nose normal.      Mouth/Throat:      Mouth: Mucous membranes are moist.   Eyes:      Extraocular Movements: Extraocular movements intact.      Conjunctiva/sclera: Conjunctivae normal.   Cardiovascular:      Rate and Rhythm: Normal rate and regular rhythm.      Pulses: Normal pulses.      Heart sounds: Normal heart sounds.   Pulmonary:      Effort: Pulmonary effort is normal.      Breath sounds: Normal breath sounds.   Abdominal:      General: Abdomen is flat.      Palpations: Abdomen is soft.      Comments: KEVIN drain x 2.  Minimal output in either drain   Musculoskeletal:         General: Normal range of motion.      Cervical back: Normal range of motion and neck supple.   Skin:     General: Skin is warm.      Capillary Refill: Capillary  refill takes less than 2 seconds.   Neurological:      Mental Status: She is alert and oriented to person, place, and time. Mental status is at baseline.   Psychiatric:         Mood and Affect: Mood normal.         Behavior: Behavior normal.               Significant Labs: All pertinent labs within the past 24 hours have been reviewed.  Recent Lab Results         06/03/25  0752   06/03/25  0339   06/03/25  0338   06/02/25  2125        Albumin   2.3           ALP   110           ALT   111           Anion Gap   10           AST   68           BILIRUBIN TOTAL   1.6  Comment: For infants and newborns, interpretation of results should be based   on gestational age, weight and in agreement with clinical   observations.    Premature Infant recommended reference ranges:   0-24 hours:  <8.0 mg/dL   24-48 hours: <12.0 mg/dL   3-5 days:    <15.0 mg/dL   6-29 days:   <15.0 mg/dL           BUN   18           Calcium   8.6           Chloride   102           CO2   24           Creatinine   0.7           eGFR   >60  Comment: Estimated GFR calculated using the CKD-EPI creatinine (2021) equation.           Glucose   99           Hematocrit   32.5           Hemoglobin   11.0           Magnesium    2.8           MCH   30.4           MCHC   33.8           MCV   90           MPV   8.7           Phosphorus Level   2.5           Platelet Estimate   Appears Normal           Platelet Count   256           POCT Glucose 101     101   82       Potassium   3.9           PROTEIN TOTAL   6.3           RBC   3.62           RDW   13.2           Sodium   136           WBC   11.89                   Significant Imaging: I have reviewed all pertinent imaging results/findings within the past 24 hours.

## 2025-06-03 NOTE — ASSESSMENT & PLAN NOTE
-s/p Nakul (POD #5)  -per primary team  -monitor KEVIN drain x 2  -Mckeon removal 06/02/2025  -patient is urinating and has had a bowel movement

## 2025-06-03 NOTE — ASSESSMENT & PLAN NOTE
Patient's blood pressure range in the last 24 hours was: BP  Min: 128/74  Max: 175/84.The patient's inpatient anti-hypertensive regimen is listed below:  Current Antihypertensives  hydrALAZINE injection 10 mg, Every 6 hours PRN, Intravenous  valsartan-hydrochlorothiazide 160-12.5 mg per tablet 1 tablet, Every morning, Oral    Plan  - uncontrolled  - we will restart patient's home valsartan-HCTZ  -continue hydralazine p.r.n.

## 2025-06-04 LAB
ABSOLUTE NEUTROPHIL MANUAL (OHS): 7.4 K/UL
ALBUMIN SERPL BCP-MCNC: 2.1 G/DL (ref 3.5–5.2)
ALBUMIN SERPL BCP-MCNC: 2.2 G/DL (ref 3.5–5.2)
ALP SERPL-CCNC: 100 UNIT/L (ref 40–150)
ALP SERPL-CCNC: 99 UNIT/L (ref 40–150)
ALT SERPL W/O P-5'-P-CCNC: 221 UNIT/L (ref 10–44)
ALT SERPL W/O P-5'-P-CCNC: 263 UNIT/L (ref 10–44)
AMYLASE FLD-CCNC: 9 U/L
ANION GAP (OHS): 10 MMOL/L (ref 8–16)
ANION GAP (OHS): 7 MMOL/L (ref 8–16)
APTT PPP: 25.2 SECONDS (ref 21–32)
AST SERPL-CCNC: 143 UNIT/L (ref 11–45)
AST SERPL-CCNC: 159 UNIT/L (ref 11–45)
BILIRUB SERPL-MCNC: 1.4 MG/DL (ref 0.1–1)
BILIRUB SERPL-MCNC: 1.4 MG/DL (ref 0.1–1)
BUN SERPL-MCNC: 15 MG/DL (ref 8–23)
BUN SERPL-MCNC: 16 MG/DL (ref 8–23)
CALCIUM SERPL-MCNC: 8 MG/DL (ref 8.7–10.5)
CALCIUM SERPL-MCNC: 8.3 MG/DL (ref 8.7–10.5)
CHLORIDE SERPL-SCNC: 103 MMOL/L (ref 95–110)
CHLORIDE SERPL-SCNC: 104 MMOL/L (ref 95–110)
CO2 SERPL-SCNC: 23 MMOL/L (ref 23–29)
CO2 SERPL-SCNC: 24 MMOL/L (ref 23–29)
CREAT SERPL-MCNC: 0.6 MG/DL (ref 0.5–1.4)
CREAT SERPL-MCNC: 0.6 MG/DL (ref 0.5–1.4)
EOSINOPHIL NFR BLD MANUAL: 1 % (ref 0–8)
ERYTHROCYTE [DISTWIDTH] IN BLOOD BY AUTOMATED COUNT: 13 % (ref 11.5–14.5)
ERYTHROCYTE [DISTWIDTH] IN BLOOD BY AUTOMATED COUNT: 13.1 % (ref 11.5–14.5)
FIBRINOGEN PPP-MCNC: >800 MG/DL (ref 182–400)
GFR SERPLBLD CREATININE-BSD FMLA CKD-EPI: >60 ML/MIN/1.73/M2
GFR SERPLBLD CREATININE-BSD FMLA CKD-EPI: >60 ML/MIN/1.73/M2
GLUCOSE SERPL-MCNC: 93 MG/DL (ref 70–110)
GLUCOSE SERPL-MCNC: 93 MG/DL (ref 70–110)
HCT VFR BLD AUTO: 28.4 % (ref 37–48.5)
HCT VFR BLD AUTO: 29.8 % (ref 37–48.5)
HGB BLD-MCNC: 10.1 GM/DL (ref 12–16)
HGB BLD-MCNC: 9.6 GM/DL (ref 12–16)
INDIRECT COOMBS: NORMAL
INR PPP: 0.9 (ref 0.8–1.2)
LARGE/GIANT PLATELETS (OHS): PRESENT
LYMPHOCYTES NFR BLD MANUAL: 18 % (ref 18–48)
MAGNESIUM SERPL-MCNC: 2.4 MG/DL (ref 1.6–2.6)
MCH RBC QN AUTO: 30.3 PG (ref 27–31)
MCH RBC QN AUTO: 30.3 PG (ref 27–31)
MCHC RBC AUTO-ENTMCNC: 33.8 G/DL (ref 32–36)
MCHC RBC AUTO-ENTMCNC: 33.9 G/DL (ref 32–36)
MCV RBC AUTO: 90 FL (ref 82–98)
MCV RBC AUTO: 90 FL (ref 82–98)
METAMYELOCYTES NFR BLD MANUAL: 1 %
MONOCYTES NFR BLD MANUAL: 8 % (ref 4–15)
NEUTROPHILS NFR BLD MANUAL: 68 % (ref 38–73)
NEUTS BAND NFR BLD MANUAL: 4 %
NUCLEATED RBC (/100WBC) (OHS): 0 /100 WBC
PHOSPHATE SERPL-MCNC: 3.2 MG/DL (ref 2.7–4.5)
PLATELET # BLD AUTO: 217 K/UL (ref 150–450)
PLATELET # BLD AUTO: 230 K/UL (ref 150–450)
PLATELET BLD QL SMEAR: NORMAL
PMV BLD AUTO: 8.6 FL (ref 9.2–12.9)
PMV BLD AUTO: 8.7 FL (ref 9.2–12.9)
POCT GLUCOSE: 100 MG/DL (ref 70–110)
POCT GLUCOSE: 107 MG/DL (ref 70–110)
POCT GLUCOSE: 76 MG/DL (ref 70–110)
POCT GLUCOSE: 91 MG/DL (ref 70–110)
POTASSIUM SERPL-SCNC: 3.6 MMOL/L (ref 3.5–5.1)
POTASSIUM SERPL-SCNC: 3.9 MMOL/L (ref 3.5–5.1)
PROT SERPL-MCNC: 5.5 GM/DL (ref 6–8.4)
PROT SERPL-MCNC: 5.7 GM/DL (ref 6–8.4)
PROTHROMBIN TIME: 10.3 SECONDS (ref 9–12.5)
RBC # BLD AUTO: 3.17 M/UL (ref 4–5.4)
RBC # BLD AUTO: 3.33 M/UL (ref 4–5.4)
RH BLD: NORMAL
SODIUM SERPL-SCNC: 135 MMOL/L (ref 136–145)
SODIUM SERPL-SCNC: 136 MMOL/L (ref 136–145)
SPECIMEN OUTDATE: NORMAL
WBC # BLD AUTO: 10.27 K/UL (ref 3.9–12.7)
WBC # BLD AUTO: 10.61 K/UL (ref 3.9–12.7)

## 2025-06-04 PROCEDURE — 85610 PROTHROMBIN TIME: CPT | Performed by: NURSE PRACTITIONER

## 2025-06-04 PROCEDURE — 84075 ASSAY ALKALINE PHOSPHATASE: CPT | Performed by: NURSE PRACTITIONER

## 2025-06-04 PROCEDURE — 25000003 PHARM REV CODE 250: Performed by: FAMILY MEDICINE

## 2025-06-04 PROCEDURE — 84100 ASSAY OF PHOSPHORUS: CPT | Performed by: SURGERY

## 2025-06-04 PROCEDURE — 86901 BLOOD TYPING SEROLOGIC RH(D): CPT | Performed by: NURSE PRACTITIONER

## 2025-06-04 PROCEDURE — 85027 COMPLETE CBC AUTOMATED: CPT | Performed by: SURGERY

## 2025-06-04 PROCEDURE — 83735 ASSAY OF MAGNESIUM: CPT | Performed by: SURGERY

## 2025-06-04 PROCEDURE — 11000001 HC ACUTE MED/SURG PRIVATE ROOM

## 2025-06-04 PROCEDURE — 97116 GAIT TRAINING THERAPY: CPT

## 2025-06-04 PROCEDURE — 97110 THERAPEUTIC EXERCISES: CPT

## 2025-06-04 PROCEDURE — 80053 COMPREHEN METABOLIC PANEL: CPT | Performed by: SURGERY

## 2025-06-04 PROCEDURE — 25000003 PHARM REV CODE 250: Performed by: SURGERY

## 2025-06-04 PROCEDURE — 85730 THROMBOPLASTIN TIME PARTIAL: CPT | Performed by: NURSE PRACTITIONER

## 2025-06-04 PROCEDURE — 85384 FIBRINOGEN ACTIVITY: CPT | Performed by: NURSE PRACTITIONER

## 2025-06-04 PROCEDURE — 85007 BL SMEAR W/DIFF WBC COUNT: CPT | Performed by: NURSE PRACTITIONER

## 2025-06-04 PROCEDURE — 63600175 PHARM REV CODE 636 W HCPCS: Performed by: SURGERY

## 2025-06-04 RX ORDER — AMLODIPINE BESYLATE 10 MG/1
10 TABLET ORAL DAILY
Status: DISCONTINUED | OUTPATIENT
Start: 2025-06-04 | End: 2025-06-05 | Stop reason: HOSPADM

## 2025-06-04 RX ADMIN — ACETAMINOPHEN 1000 MG: 500 TABLET ORAL at 06:06

## 2025-06-04 RX ADMIN — METOCLOPRAMIDE 10 MG: 5 INJECTION, SOLUTION INTRAMUSCULAR; INTRAVENOUS at 06:06

## 2025-06-04 RX ADMIN — HYDROCHLOROTHIAZIDE 12.5 MG: 12.5 TABLET ORAL at 09:06

## 2025-06-04 RX ADMIN — IBUPROFEN 400 MG: 400 TABLET ORAL at 06:06

## 2025-06-04 RX ADMIN — VALSARTAN 160 MG: 160 TABLET, FILM COATED ORAL at 10:06

## 2025-06-04 RX ADMIN — IBUPROFEN 400 MG: 400 TABLET ORAL at 09:06

## 2025-06-04 RX ADMIN — Medication 200 ML: at 10:06

## 2025-06-04 RX ADMIN — Medication 200 ML: at 02:06

## 2025-06-04 RX ADMIN — Medication 200 ML: at 06:06

## 2025-06-04 RX ADMIN — Medication 200 ML: at 09:06

## 2025-06-04 RX ADMIN — IBUPROFEN 400 MG: 400 TABLET ORAL at 01:06

## 2025-06-04 RX ADMIN — METOCLOPRAMIDE 10 MG: 5 INJECTION, SOLUTION INTRAMUSCULAR; INTRAVENOUS at 10:06

## 2025-06-04 RX ADMIN — ENOXAPARIN SODIUM 40 MG: 40 INJECTION SUBCUTANEOUS at 04:06

## 2025-06-04 RX ADMIN — AMLODIPINE BESYLATE 10 MG: 10 TABLET ORAL at 04:06

## 2025-06-04 RX ADMIN — PIPERACILLIN SODIUM AND TAZOBACTAM SODIUM 4.5 G: 4; .5 INJECTION, POWDER, FOR SOLUTION INTRAVENOUS at 06:06

## 2025-06-04 RX ADMIN — VALSARTAN 160 MG: 160 TABLET, FILM COATED ORAL at 09:06

## 2025-06-04 RX ADMIN — FAMOTIDINE 20 MG: 20 TABLET, FILM COATED ORAL at 10:06

## 2025-06-04 RX ADMIN — Medication 200 ML: at 01:06

## 2025-06-04 RX ADMIN — SENNOSIDES 8.6 MG: 8.6 TABLET, FILM COATED ORAL at 09:06

## 2025-06-04 RX ADMIN — ACETAMINOPHEN 1000 MG: 500 TABLET ORAL at 10:06

## 2025-06-04 RX ADMIN — SENNOSIDES 8.6 MG: 8.6 TABLET, FILM COATED ORAL at 10:06

## 2025-06-04 RX ADMIN — METOCLOPRAMIDE 10 MG: 5 INJECTION, SOLUTION INTRAMUSCULAR; INTRAVENOUS at 12:06

## 2025-06-04 RX ADMIN — ACETAMINOPHEN 1000 MG: 500 TABLET ORAL at 02:06

## 2025-06-04 RX ADMIN — FAMOTIDINE 20 MG: 20 TABLET, FILM COATED ORAL at 09:06

## 2025-06-04 NOTE — PT/OT/SLP PROGRESS
Physical Therapy  Treatment    Chel Thompson   MRN: [id number]   Admitting Diagnosis: Intraductal papillary mucinous neoplasm of pancreas       PT Start Time: 1330     PT Stop Time: 1353    PT Total Time (min): 23 min       Billable Minutes:  Gait Training 15 and Therapeutic Exercise 8    Treatment Type: Treatment  PT/PTA: PT     Number of PTA visits since last PT visit: 0       General Precautions: Standard, fall  Orthopedic Precautions: N/A  Braces: N/A  Respiratory Status: Room air    Spiritual, Cultural Beliefs, Nondenominational Practices, Values that Affect Care: no    Subjective:  Communicated with nursing (Jesús) and performed chart review via epic system prior to session.  Pt agreeable to PT services    Pain/Comfort  Pain Rating 1: 0/10  Pain Rating Post-Intervention 1: 0/10    Objective:   Patient found with: peripheral IV, telemetry, KEVIN drain. Pt supine in bed    Functional Mobility:  Bed Mobility:    Supine to sit SBA   Sit to supine SBA    Transfers:   Sit<>stand SBA with no AD   Stand pivot SBA with IV pole    Gait:    Facilitated gait activity 100 ft x 2 CGA with IV pole, demonstrated slow pace, narrow REBEKAH, flexed posture, shuffled steps    Balance:   Dynamic Sit: FAIR+: Maintains balance through MINIMAL excursions of active trunk motion  Dynamic stand: FAIR: Needs CONTACT GUARD during gait       Treatment and Education:  Educated pt on benefits of consistent participation in PT services to meet functional goals. Educated pt on seated therex to promote strength, circulation and joint mobility. Exercises included AP, LAQ, marching, hip abd/add (pillow squeezes). Educated to perform exercises intermittently throughout day to tolerance. Educated pt on importance of sitting OOB to promote endurance and overall activity tolerance. Educated pt on call don't fall policy and use of call button to alert nursing staff of needs (including to assist in/out of bed). Pt expressed understanding.      AM-PAC 6  CLICK MOBILITY  How much help from another person does this patient currently need?   1 = Unable, Total/Dependent Assistance  2 = A lot, Maximum/Moderate Assistance  3 = A little, Minimum/Contact Guard/Supervision  4 = None, Modified [place]/Independent    Turning over in bed (including adjusting bedclothes, sheets and blankets)?: 3  Sitting down on and standing up from a chair with arms (e.g., wheelchair, bedside commode, etc.): 3  Moving from lying on back to sitting on the side of the bed?: 3  Moving to and from a bed to a chair (including a wheelchair)?: 3  Need to walk in hospital room?: 3  Climbing 3-5 steps with a railing?: 1  Basic Mobility Total Score: 16    AM-PAC Raw Score CMS G-Code Modifier Level of Impairment Assistance   6 % Total / Unable   7 - 9 CM 80 - 100% Maximal Assist   10 - 14 CL 60 - 80% Moderate Assist   15 - 19 CK 40 - 60% Moderate Assist   20 - 22 CJ 20 - 40% Minimal Assist   23 CI 1-20% SBA / CGA   24 CH 0% Independent/ Mod I     Patient left supine with all lines intact, call button in reach, and nursing notified.    Assessment:  Chel Thompson is a 64 y.o. female with a medical diagnosis of Intraductal papillary mucinous neoplasm of pancreas and presents with deficits in overall mobility. Pt was able to tolerate gait activity with IV pole, slight unsteadiness on feet during mobility but no LOB. Pt will benefit from continued PT services in order to progress toward baseline.    Rehab identified problem list/impairments: weakness, impaired endurance, impaired functional mobility, gait instability, impaired balance, decreased coordination, decreased lower extremity function, decreased safety awareness    Rehab potential is good.    Activity tolerance: Good    Discharge recommendations: Low Intensity Therapy      Barriers to discharge:      Equipment recommendations: walker, rolling     GOALS:   Multidisciplinary Problems       Physical Therapy Goals           Problem: Physical Therapy    Goal Priority Disciplines Outcome Interventions   Physical Therapy Goal     PT, PT/OT Progressing    Description: LTG'S TO BE MET IN 14 DAYS (6-13-25)  PT WILL BE JANETTE FOR BED MOBILITY  PT WILL BE JANETTE FOR BED<>CHAIR TF'S  PT WILL  FEET WITH OR WITHOUT RW AND JANETTE  PT WILL INC AMPAC SCORE BY 2 POINTS TO PROGRESS GROSS FUNC MOBILITY                         DME Justifications:  TBD    PLAN:    Patient to be seen 3 x/week to address the above listed problems via gait training, therapeutic activities, therapeutic exercises, neuromuscular re-education  Plan of Care expires: 06/13/25  Plan of Care reviewed with: patient         06/04/2025

## 2025-06-04 NOTE — PROGRESS NOTES
[facility] [place] Medicine  Progress Note    Patient Name: Chel Thompson  MRN: [id number]  Patient Class: IP- Inpatient   Admission Date: 5/29/2025  Length of Stay: 6 days  Attending Physician: Yolie Lieberman MD  Primary Care Provider: Yesica Burk MD        Subjective     Principal Problem:Intraductal papillary mucinous neoplasm of pancreas        HPI:  Ms. Thompson is a 64-year-old female history of hypertension who presented for a Whipple procedure for IPMN.  Patient has a history of having multiple episodes of pancreatitis.  Patient had an MRI of her spine for pain but also showed multiocular cystic mass at the head of the pancreas.  Patient then had a MRI MRCP of the abdomen in November.  This showed dilation of the main pancreatic duct.  Patient is now in PACU status post procedure.  ICU consulted for ICU management and monitoring postop.  Patient is currently drowsy and unable to provide a history.  Vital signs are stable.  She does have 2 KEVIN drains.     Hospital/ICU Course:  5/30 no acute events  No complaints   05/31/2025: Continued pain, using PCA. Up to chair this morning. Encouraging IS/ambulation. Urinary retention requiring straight cath overnight. Cont ICU care.  06/01/2025: Episode of agitation overnight, possible ICU delirium, received Haldol. Back to baseline this morning, calm. Stable to SD from an ICU standpoint.   Hospital medicine consulted.     Overview/Hospital Course:  No notes on file    Interval History:  Follow up for IPMN of the pancreas.  Patient doing well this morning but stated that she did have a bout of vomiting overnight.  She denies any nausea and did eat a little bit of her breakfast this morning but stated she did not like the taste.  Patient was noted to have a mild increase in her LFTs but on repeat is trending down.  Patient is noted to have a slightly elevated blood pressure therefore we will add amlodipine for better blood pressure control.    Review  of Systems  Objective:     Vital Signs (Most Recent):  Temp: 97.8 °F (36.6 °C) (06/04/25 1129)  Pulse: 83 (06/04/25 1129)  Resp: 18 (06/04/25 1129)  BP: (!) 165/83 (06/04/25 1129)  SpO2: (!) 94 % (06/04/25 1129) Vital Signs (24h Range):  Temp:  [97.8 °F (36.6 °C)-98.8 °F (37.1 °C)] 97.8 °F (36.6 °C)  Pulse:  [] 83  Resp:  [16-27] 18  SpO2:  [92 %-97 %] 94 %  BP: (134-189)/(74-89) 165/83     Weight: 87.1 kg (192 lb 0.3 oz)  Body mass index is 29.2 kg/m².    Intake/Output Summary (Last 24 hours) at 6/4/2025 1444  Last data filed at 6/4/2025 1409  Gross per 24 hour   Intake 1180.3 ml   Output 217 ml   Net 963.3 ml         Physical Exam  Vitals and nursing note reviewed.   Constitutional:       Appearance: Normal appearance.   HENT:      Head: Normocephalic and atraumatic.      Nose: Nose normal.      Mouth/Throat:      Mouth: Mucous membranes are moist.   Eyes:      Extraocular Movements: Extraocular movements intact.      Conjunctiva/sclera: Conjunctivae normal.   Cardiovascular:      Rate and Rhythm: Normal rate and regular rhythm.      Pulses: Normal pulses.      Heart sounds: Normal heart sounds.   Pulmonary:      Effort: Pulmonary effort is normal.      Breath sounds: Normal breath sounds.   Abdominal:      General: Abdomen is flat. Bowel sounds are normal.      Palpations: Abdomen is soft.      Comments: KEVIN drain x 2.  Drain 1 no output, drain to serosanguineous output   Musculoskeletal:         General: Normal range of motion.      Cervical back: Normal range of motion and neck supple.   Skin:     General: Skin is warm.      Capillary Refill: Capillary refill takes less than 2 seconds.   Neurological:      Mental Status: She is alert and oriented to person, place, and time. Mental status is at baseline.   Psychiatric:         Mood and Affect: Mood normal.         Behavior: Behavior normal.               Significant Labs: All pertinent labs within the past 24 hours have been reviewed.  Recent Lab Results   (Last 5 results in the past 24 hours)        06/04/25  1011   06/04/25  0639   06/04/25  0203   06/04/25  0202   06/04/25  0155        Albumin   2.2     2.1         ALP   99     100         ALT   263     221         Anion Gap   10     7         PTT       25.2  Comment: Refer to local heparin nomogram for intensity/dose specific therapeutic range.         AST   159     143         Bands       4.0         BILIRUBIN TOTAL   1.4  Comment: For infants and newborns, interpretation of results should be based   on gestational age, weight and in agreement with clinical   observations.    Premature Infant recommended reference ranges:   0-24 hours:  <8.0 mg/dL   24-48 hours: <12.0 mg/dL   3-5 days:    <15.0 mg/dL   6-29 days:   <15.0 mg/dL     1.4  Comment: For infants and newborns, interpretation of results should be based   on gestational age, weight and in agreement with clinical   observations.    Premature Infant recommended reference ranges:   0-24 hours:  <8.0 mg/dL   24-48 hours: <12.0 mg/dL   3-5 days:    <15.0 mg/dL   6-29 days:   <15.0 mg/dL         BUN   15     16         Calcium   8.3     8.0         Chloride   103     104         CO2   23     24         Creatinine   0.6     0.6         eGFR   >60  Comment: Estimated GFR calculated using the CKD-EPI creatinine (2021) equation.     >60  Comment: Estimated GFR calculated using the CKD-EPI creatinine (2021) equation.         Eos %       1.0         Fibrinogen       >800         Giant Platelets       Present         Glucose   93     93         Gran # (ANC)       7.4         Group & Rh     O POS           Hematocrit   29.8     28.4         Hemoglobin   10.1     9.6         INDIRECT KYLEE     NEG           INR       0.9  Comment: Coumadin Therapy:    2.0 - 3.0 for INR for all indicators except mechanical heart valves    and antiphospholipid syndromes which should use 2.5 - 3.5.         Lymph %       18.0         Magnesium    2.4             MCH   30.3     30.3          MCHC   33.9     33.8         MCV   90     90         Metamyelocytes       1.0         Mono %       8.0         MPV   8.7     8.6         nRBC       0         Phosphorus Level   3.2             Platelet Estimate       Appears Normal         Platelet Count   230     217         POCT Glucose 100         107       Potassium   3.6     3.9         PROTEIN TOTAL   5.7     5.5         PT       10.3         RBC   3.33     3.17         RDW   13.1     13.0         Segmented Neutrophil %       68.0         Sodium   136     135         Specimen Outdate     06/07/2025 23:59           WBC   10.61     10.27                                Significant Imaging: I have reviewed all pertinent imaging results/findings within the past 24 hours.      Assessment & Plan  Intraductal papillary mucinous neoplasm of pancreas  -s/p Whipple (POD #5)  -per primary team  -monitor KEVIN drain x 2  -Mckeon removal 06/02/2025  -patient is urinating and has had a bowel movement    Hypertension  Patient's blood pressure range in the last 24 hours was: BP  Min: 134/75  Max: 189/88.The patient's inpatient anti-hypertensive regimen is listed below:  Current Antihypertensives  hydrALAZINE injection 10 mg, Every 6 hours PRN, Intravenous  valsartan tablet 160 mg, 2 times daily, Oral  hydroCHLOROthiazide tablet 12.5 mg, Daily, Oral  amLODIPine tablet 10 mg, Daily, Oral    Plan  - uncontrolled  - we will restart patient's home valsartan-HCTZ  -continue hydralazine p.r.n.  -had amlodipine 10 mg   H/O acute pancreatitis      Transaminitis  -improving  -avoid hepatoxic medications  -we will defer remainder of management to primary service    VTE Risk Mitigation (From admission, onward)           Ordered     enoxaparin injection 40 mg  Every 24 hours         05/31/25 1112     Place sequential compression device  Until discontinued         05/29/25 5026                    Discharge Planning   DAVIDA: 6/5/2025     Code Status: Full Code   Medical Readiness for Discharge  Date:   Discharge Plan A: Home Health        Patient is medically stable from a hospitalist standpoint.  We will continue to follow up from the periphery.  Please do not hesitate to call with any questions or concerns.        Please place Justification for DME        Gary Dubose MD  Department of Hospital Medicine   [facility] Med Surg

## 2025-06-04 NOTE — SUBJECTIVE & OBJECTIVE
Interval History:  Follow up for IPMN of the pancreas.  Patient doing well this morning but stated that she did have a bout of vomiting overnight.  She denies any nausea and did eat a little bit of her breakfast this morning but stated she did not like the taste.  Patient was noted to have a mild increase in her LFTs but on repeat is trending down.  Patient is noted to have a slightly elevated blood pressure therefore we will add amlodipine for better blood pressure control.    Review of Systems  Objective:     Vital Signs (Most Recent):  Temp: 97.8 °F (36.6 °C) (06/04/25 1129)  Pulse: 83 (06/04/25 1129)  Resp: 18 (06/04/25 1129)  BP: (!) 165/83 (06/04/25 1129)  SpO2: (!) 94 % (06/04/25 1129) Vital Signs (24h Range):  Temp:  [97.8 °F (36.6 °C)-98.8 °F (37.1 °C)] 97.8 °F (36.6 °C)  Pulse:  [] 83  Resp:  [16-27] 18  SpO2:  [92 %-97 %] 94 %  BP: (134-189)/(74-89) 165/83     Weight: 87.1 kg (192 lb 0.3 oz)  Body mass index is 29.2 kg/m².    Intake/Output Summary (Last 24 hours) at 6/4/2025 1444  Last data filed at 6/4/2025 1409  Gross per 24 hour   Intake 1180.3 ml   Output 217 ml   Net 963.3 ml         Physical Exam  Vitals and nursing note reviewed.   Constitutional:       Appearance: Normal appearance.   HENT:      Head: Normocephalic and atraumatic.      Nose: Nose normal.      Mouth/Throat:      Mouth: Mucous membranes are moist.   Eyes:      Extraocular Movements: Extraocular movements intact.      Conjunctiva/sclera: Conjunctivae normal.   Cardiovascular:      Rate and Rhythm: Normal rate and regular rhythm.      Pulses: Normal pulses.      Heart sounds: Normal heart sounds.   Pulmonary:      Effort: Pulmonary effort is normal.      Breath sounds: Normal breath sounds.   Abdominal:      General: Abdomen is flat. Bowel sounds are normal.      Palpations: Abdomen is soft.      Comments: KEVIN drain x 2.  Drain 1 no output, drain to serosanguineous output   Musculoskeletal:         General: Normal range of  motion.      Cervical back: Normal range of motion and neck supple.   Skin:     General: Skin is warm.      Capillary Refill: Capillary refill takes less than 2 seconds.   Neurological:      Mental Status: She is alert and oriented to person, place, and time. Mental status is at baseline.   Psychiatric:         Mood and Affect: Mood normal.         Behavior: Behavior normal.               Significant Labs: All pertinent labs within the past 24 hours have been reviewed.  Recent Lab Results  (Last 5 results in the past 24 hours)        06/04/25  1011   06/04/25  0639   06/04/25  0203   06/04/25  0202   06/04/25  0155        Albumin   2.2     2.1         ALP   99     100         ALT   263     221         Anion Gap   10     7         PTT       25.2  Comment: Refer to local heparin nomogram for intensity/dose specific therapeutic range.         AST   159     143         Bands       4.0         BILIRUBIN TOTAL   1.4  Comment: For infants and newborns, interpretation of results should be based   on gestational age, weight and in agreement with clinical   observations.    Premature Infant recommended reference ranges:   0-24 hours:  <8.0 mg/dL   24-48 hours: <12.0 mg/dL   3-5 days:    <15.0 mg/dL   6-29 days:   <15.0 mg/dL     1.4  Comment: For infants and newborns, interpretation of results should be based   on gestational age, weight and in agreement with clinical   observations.    Premature Infant recommended reference ranges:   0-24 hours:  <8.0 mg/dL   24-48 hours: <12.0 mg/dL   3-5 days:    <15.0 mg/dL   6-29 days:   <15.0 mg/dL         BUN   15     16         Calcium   8.3     8.0         Chloride   103     104         CO2   23     24         Creatinine   0.6     0.6         eGFR   >60  Comment: Estimated GFR calculated using the CKD-EPI creatinine (2021) equation.     >60  Comment: Estimated GFR calculated using the CKD-EPI creatinine (2021) equation.         Eos %       1.0         Fibrinogen       >800          Giant Platelets       Present         Glucose   93     93         Gran # (ANC)       7.4         Group & Rh     O POS           Hematocrit   29.8     28.4         Hemoglobin   10.1     9.6         INDIRECT KYLEE     NEG           INR       0.9  Comment: Coumadin Therapy:    2.0 - 3.0 for INR for all indicators except mechanical heart valves    and antiphospholipid syndromes which should use 2.5 - 3.5.         Lymph %       18.0         Magnesium    2.4             MCH   30.3     30.3         MCHC   33.9     33.8         MCV   90     90         Metamyelocytes       1.0         Mono %       8.0         MPV   8.7     8.6         nRBC       0         Phosphorus Level   3.2             Platelet Estimate       Appears Normal         Platelet Count   230     217         POCT Glucose 100         107       Potassium   3.6     3.9         PROTEIN TOTAL   5.7     5.5         PT       10.3         RBC   3.33     3.17         RDW   13.1     13.0         Segmented Neutrophil %       68.0         Sodium   136     135         Specimen Outdate     06/07/2025 23:59           WBC   10.61     10.27                                Significant Imaging: I have reviewed all pertinent imaging results/findings within the past 24 hours.

## 2025-06-04 NOTE — ASSESSMENT & PLAN NOTE
-s/p Nakul (POD #5)  -per primary team  -monitor KEIVN drain x 2  -Mckeon removal 06/02/2025  -patient is urinating and has had a bowel movement

## 2025-06-04 NOTE — ASSESSMENT & PLAN NOTE
-improving  -avoid hepatoxic medications  -we will defer remainder of management to primary service

## 2025-06-04 NOTE — PROGRESS NOTES
[facility] S[facility]  General Surgery  Progress Note    Subjective:     History of Present Illness:  No notes on file    Post-Op Info:  Procedure(s) (LRB):  WHIPPLE PROCEDURE (N/A)   6 Days Post-Op     Interval History: AFVSS.  One episode emesis overnight.  LFTs uptrending but likely 2/2 fluid shifts.  Drain amylases have normalized.      Medications:  Continuous Infusions:  Scheduled Meds:   acetaminophen  1,000 mg Oral Q8H    amLODIPine  10 mg Oral Daily    electrolytes-dextrose  200 mL Oral Q4H    famotidine  20 mg Oral BID    valsartan  160 mg Oral BID    And    hydroCHLOROthiazide  12.5 mg Oral Daily    ibuprofen  400 mg Oral Q8H    metoclopramide  10 mg Intravenous Q6H    senna  8.6 mg Oral BID     PRN Meds:  Current Facility-Administered Medications:     dextrose 50%, 12.5 g, Intravenous, PRN    glucagon (human recombinant), 1 mg, Intramuscular, PRN    haloperidol lactate, 5 mg, Intravenous, Q6H PRN    hydrALAZINE, 10 mg, Intravenous, Q6H PRN    HYDROmorphone, 1 mg, Intravenous, Q6H PRN    insulin aspart U-100, 0-5 Units, Subcutaneous, Q6H PRN    magnesium sulfate 2 g IVPB, 2 g, Intravenous, PRN    magnesium sulfate 2 g IVPB, 2 g, Intravenous, PRN    naloxone, 0.02 mg, Intravenous, PRN    ondansetron, 4 mg, Intravenous, Q6H PRN    oxyCODONE, 10 mg, Oral, Q6H PRN    oxyCODONE, 5 mg, Oral, Q4H PRN    pneumoc 20-rajinder conj-dip cr(PF), 0.5 mL, Intramuscular, Prior to discharge    potassium chloride in water, 40 mEq, Intravenous, PRN **AND** potassium chloride in water, 60 mEq, Intravenous, PRN **AND** potassium chloride in water, 80 mEq, Intravenous, PRN     Review of patient's allergies indicates:   Allergen Reactions    Dilaudid [hydromorphone] Itching     From IV    Nitrofurantoin Other (See Comments)     Cramping of legs     Objective:     Vital Signs (Most Recent):  Temp: 98.4 °F (36.9 °C) (06/04/25 1635)  Pulse: 87 (06/04/25 1635)  Resp: 18 (06/04/25 1635)  BP: (!) 140/80 (06/04/25 1635)  SpO2: 97 % (06/04/25  1635) Vital Signs (24h Range):  Temp:  [97.8 °F (36.6 °C)-98.4 °F (36.9 °C)] 98.4 °F (36.9 °C)  Pulse:  [] 87  Resp:  [16-18] 18  SpO2:  [92 %-97 %] 97 %  BP: (134-165)/(74-86) 140/80     Weight: 87.1 kg (192 lb 0.3 oz)  Body mass index is 29.2 kg/m².    Intake/Output - Last 3 Shifts         06/02 0700  06/03 0659 06/03 0700  06/04 0659 06/04 0700  06/05 0659    P.O. 200 850 200    I.V. (mL/kg) 392.2 (4.5)      IV Piggyback 554.1 254.9     Total Intake(mL/kg) 1146.3 (13.2) 1104.9 (12.7) 200 (2.3)    Urine (mL/kg/hr) 360 (0.2) 0 (0)     Drains 180 332 90    Stool 0 0     Total Output 540 332 90    Net +606.3 +772.9 +110           Unmeasured Urine Occurrence 1 x 3 x     Unmeasured Stool Occurrence 1 x 1 x     Unmeasured Emesis Occurrence  1 x              Physical Exam  Vitals and nursing note reviewed.   Constitutional:       General: She is not in acute distress.     Appearance: Normal appearance.   HENT:      Head: Normocephalic and atraumatic.   Eyes:      Extraocular Movements: Extraocular movements intact.      Conjunctiva/sclera: Conjunctivae normal.   Cardiovascular:      Rate and Rhythm: Normal rate and regular rhythm.   Pulmonary:      Effort: Pulmonary effort is normal.   Abdominal:      General: Abdomen is flat. There is no distension.      Palpations: Abdomen is soft. There is no mass.      Tenderness: There is no guarding or rebound.      Hernia: No hernia is present.      Comments: Appropriately TTP, incision c/d/I- with bruising, KEVIN drain #1- serosang, KEVIN drain #2- serous    Musculoskeletal:         General: No swelling, tenderness, deformity or signs of injury. Normal range of motion.   Skin:     General: Skin is warm and dry.      Coloration: Skin is not jaundiced.   Neurological:      General: No focal deficit present.      Mental Status: She is alert and oriented to person, place, and time.   Psychiatric:         Mood and Affect: Mood normal.         Behavior: Behavior normal.         Thought  Content: Thought content normal.          Significant Labs:  I have reviewed all pertinent lab results within the past 24 hours.  CBC:   Recent Labs   Lab 06/04/25  0639   WBC 10.61   RBC 3.33*   HGB 10.1*   HCT 29.8*      MCV 90   MCH 30.3   MCHC 33.9     BMP:   Recent Labs   Lab 06/04/25  0639   GLU 93      K 3.6      CO2 23   BUN 15   CREATININE 0.6   CALCIUM 8.3*   MG 2.4     CMP:   Recent Labs   Lab 06/04/25  0639   GLU 93   CALCIUM 8.3*   ALBUMIN 2.2*   PROT 5.7*      K 3.6   CO2 23      BUN 15   CREATININE 0.6   ALKPHOS 99   *   *   BILITOT 1.4*       Significant Diagnostics:  I have reviewed all pertinent imaging results/findings within the past 24 hours.  Assessment/Plan:     * Intraductal papillary mucinous neoplasm of pancreas  POD #6- s/p open whipple.     Doing well.  One episode of small emesis overnight.      -- low fat diet.  NPO after midnight  -- continue PO meds  -- stop Zosyn  -- plan for CT scan with PO and IV contrast tomorrow in preparation for d/c home tomorrow  -- continue reglan IV   -- continue BID senna and MOM today   -- encourage ambulation and IS  Dispo: continued med/surg - plan for d/c tomorrow     Lovenox and SCDs for DVT ppx, Famotidine for GI ppx        Hypertension  -- holding home Diovan-HCT  -- appreciate ICU assistance with management        Yolie Lieberman MD  General Surgery  [facility]

## 2025-06-04 NOTE — PLAN OF CARE
TX COMPLETED: facilitated bed mobility with SBA, transfers with SBA, ambulated 100 ft x 2 with IV pole. Recommend continued PT services at low intensity.

## 2025-06-04 NOTE — ASSESSMENT & PLAN NOTE
Patient's blood pressure range in the last 24 hours was: BP  Min: 134/75  Max: 189/88.The patient's inpatient anti-hypertensive regimen is listed below:  Current Antihypertensives  hydrALAZINE injection 10 mg, Every 6 hours PRN, Intravenous  valsartan tablet 160 mg, 2 times daily, Oral  hydroCHLOROthiazide tablet 12.5 mg, Daily, Oral  amLODIPine tablet 10 mg, Daily, Oral    Plan  - uncontrolled  - we will restart patient's home valsartan-HCTZ  -continue hydralazine p.r.n.  -had amlodipine 10 mg

## 2025-06-04 NOTE — SUBJECTIVE & OBJECTIVE
Interval History: AFVSS.  One episode emesis overnight.  LFTs uptrending but likely 2/2 fluid shifts.  Drain amylases have normalized.      Medications:  Continuous Infusions:  Scheduled Meds:   acetaminophen  1,000 mg Oral Q8H    amLODIPine  10 mg Oral Daily    electrolytes-dextrose  200 mL Oral Q4H    famotidine  20 mg Oral BID    valsartan  160 mg Oral BID    And    hydroCHLOROthiazide  12.5 mg Oral Daily    ibuprofen  400 mg Oral Q8H    metoclopramide  10 mg Intravenous Q6H    senna  8.6 mg Oral BID     PRN Meds:  Current Facility-Administered Medications:     dextrose 50%, 12.5 g, Intravenous, PRN    glucagon (human recombinant), 1 mg, Intramuscular, PRN    haloperidol lactate, 5 mg, Intravenous, Q6H PRN    hydrALAZINE, 10 mg, Intravenous, Q6H PRN    HYDROmorphone, 1 mg, Intravenous, Q6H PRN    insulin aspart U-100, 0-5 Units, Subcutaneous, Q6H PRN    magnesium sulfate 2 g IVPB, 2 g, Intravenous, PRN    magnesium sulfate 2 g IVPB, 2 g, Intravenous, PRN    naloxone, 0.02 mg, Intravenous, PRN    ondansetron, 4 mg, Intravenous, Q6H PRN    oxyCODONE, 10 mg, Oral, Q6H PRN    oxyCODONE, 5 mg, Oral, Q4H PRN    pneumoc 20-rajinder conj-dip cr(PF), 0.5 mL, Intramuscular, Prior to discharge    potassium chloride in water, 40 mEq, Intravenous, PRN **AND** potassium chloride in water, 60 mEq, Intravenous, PRN **AND** potassium chloride in water, 80 mEq, Intravenous, PRN     Review of patient's allergies indicates:   Allergen Reactions    Dilaudid [hydromorphone] Itching     From IV    Nitrofurantoin Other (See Comments)     Cramping of legs     Objective:     Vital Signs (Most Recent):  Temp: 98.4 °F (36.9 °C) (06/04/25 1635)  Pulse: 87 (06/04/25 1635)  Resp: 18 (06/04/25 1635)  BP: (!) 140/80 (06/04/25 1635)  SpO2: 97 % (06/04/25 1635) Vital Signs (24h Range):  Temp:  [97.8 °F (36.6 °C)-98.4 °F (36.9 °C)] 98.4 °F (36.9 °C)  Pulse:  [] 87  Resp:  [16-18] 18  SpO2:  [92 %-97 %] 97 %  BP: (134-165)/(74-86) 140/80      Weight: 87.1 kg (192 lb 0.3 oz)  Body mass index is 29.2 kg/m².    Intake/Output - Last 3 Shifts         06/02 0700  06/03 0659 06/03 0700  06/04 0659 06/04 0700  06/05 0659    P.O. 200 850 200    I.V. (mL/kg) 392.2 (4.5)      IV Piggyback 554.1 254.9     Total Intake(mL/kg) 1146.3 (13.2) 1104.9 (12.7) 200 (2.3)    Urine (mL/kg/hr) 360 (0.2) 0 (0)     Drains 180 332 90    Stool 0 0     Total Output 540 332 90    Net +606.3 +772.9 +110           Unmeasured Urine Occurrence 1 x 3 x     Unmeasured Stool Occurrence 1 x 1 x     Unmeasured Emesis Occurrence  1 x              Physical Exam  Vitals and nursing note reviewed.   Constitutional:       General: She is not in acute distress.     Appearance: Normal appearance.   HENT:      Head: Normocephalic and atraumatic.   Eyes:      Extraocular Movements: Extraocular movements intact.      Conjunctiva/sclera: Conjunctivae normal.   Cardiovascular:      Rate and Rhythm: Normal rate and regular rhythm.   Pulmonary:      Effort: Pulmonary effort is normal.   Abdominal:      General: Abdomen is flat. There is no distension.      Palpations: Abdomen is soft. There is no mass.      Tenderness: There is no guarding or rebound.      Hernia: No hernia is present.      Comments: Appropriately TTP, incision c/d/I- with bruising, KEVIN drain #1- serosang, KEVIN drain #2- serous    Musculoskeletal:         General: No swelling, tenderness, deformity or signs of injury. Normal range of motion.   Skin:     General: Skin is warm and dry.      Coloration: Skin is not jaundiced.   Neurological:      General: No focal deficit present.      Mental Status: She is alert and oriented to person, place, and time.   Psychiatric:         Mood and Affect: Mood normal.         Behavior: Behavior normal.         Thought Content: Thought content normal.          Significant Labs:  I have reviewed all pertinent lab results within the past 24 hours.  CBC:   Recent Labs   Lab 06/04/25  0639   WBC 10.61   RBC  3.33*   HGB 10.1*   HCT 29.8*      MCV 90   MCH 30.3   MCHC 33.9     BMP:   Recent Labs   Lab 06/04/25  0639   GLU 93      K 3.6      CO2 23   BUN 15   CREATININE 0.6   CALCIUM 8.3*   MG 2.4     CMP:   Recent Labs   Lab 06/04/25  0639   GLU 93   CALCIUM 8.3*   ALBUMIN 2.2*   PROT 5.7*      K 3.6   CO2 23      BUN 15   CREATININE 0.6   ALKPHOS 99   *   *   BILITOT 1.4*       Significant Diagnostics:  I have reviewed all pertinent imaging results/findings within the past 24 hours.

## 2025-06-04 NOTE — ASSESSMENT & PLAN NOTE
POD #6- s/p open whipple.     Doing well.  One episode of small emesis overnight.      -- low fat diet.  NPO after midnight  -- continue PO meds  -- stop Zosyn  -- plan for CT scan with PO and IV contrast tomorrow in preparation for d/c home tomorrow  -- continue reglan IV   -- continue BID senna and MOM today   -- encourage ambulation and IS  Dispo: continued med/surg - plan for d/c tomorrow     Lovenox and SCDs for DVT ppx, Famotidine for GI ppx

## 2025-06-04 NOTE — PLAN OF CARE
Discussed poc with pt, pt verbalized understanding    Purposeful rounding every 2hours    VS wnl  Cardiac monitoring in use, pt is NSR, tele monitor #[id number]  Blood glucose monitoring   Fall precautions in place, remains injury free  Pt denies c/o pain and nausea at this time   Pain and nausea under control with PRN meds    Accurate I&Os  Abx given as prescribed  Bed locked at lowest position  Call light within reach    Chart check complete  Will cont with POC

## 2025-06-05 VITALS
OXYGEN SATURATION: 96 % | HEART RATE: 83 BPM | SYSTOLIC BLOOD PRESSURE: 163 MMHG | HEIGHT: 68 IN | TEMPERATURE: 98 F | WEIGHT: 192 LBS | DIASTOLIC BLOOD PRESSURE: 79 MMHG | BODY MASS INDEX: 29.1 KG/M2 | RESPIRATION RATE: 18 BRPM

## 2025-06-05 LAB
ALBUMIN SERPL BCP-MCNC: 2.3 G/DL (ref 3.5–5.2)
ALP SERPL-CCNC: 95 UNIT/L (ref 40–150)
ALT SERPL W/O P-5'-P-CCNC: 457 UNIT/L (ref 10–44)
ANION GAP (OHS): 11 MMOL/L (ref 8–16)
AST SERPL-CCNC: 239 UNIT/L (ref 11–45)
BILIRUB SERPL-MCNC: 0.9 MG/DL (ref 0.1–1)
BUN SERPL-MCNC: 15 MG/DL (ref 8–23)
CALCIUM SERPL-MCNC: 8.4 MG/DL (ref 8.7–10.5)
CHLORIDE SERPL-SCNC: 103 MMOL/L (ref 95–110)
CO2 SERPL-SCNC: 21 MMOL/L (ref 23–29)
CREAT SERPL-MCNC: 0.6 MG/DL (ref 0.5–1.4)
ERYTHROCYTE [DISTWIDTH] IN BLOOD BY AUTOMATED COUNT: 13.1 % (ref 11.5–14.5)
GFR SERPLBLD CREATININE-BSD FMLA CKD-EPI: >60 ML/MIN/1.73/M2
GLUCOSE SERPL-MCNC: 74 MG/DL (ref 70–110)
HCT VFR BLD AUTO: 28.7 % (ref 37–48.5)
HGB BLD-MCNC: 9.7 GM/DL (ref 12–16)
MAGNESIUM SERPL-MCNC: 2 MG/DL (ref 1.6–2.6)
MCH RBC QN AUTO: 30.2 PG (ref 27–31)
MCHC RBC AUTO-ENTMCNC: 33.8 G/DL (ref 32–36)
MCV RBC AUTO: 89 FL (ref 82–98)
PHOSPHATE SERPL-MCNC: 3.6 MG/DL (ref 2.7–4.5)
PLATELET # BLD AUTO: 255 K/UL (ref 150–450)
PMV BLD AUTO: 8.7 FL (ref 9.2–12.9)
POCT GLUCOSE: 71 MG/DL (ref 70–110)
POCT GLUCOSE: 83 MG/DL (ref 70–110)
POTASSIUM SERPL-SCNC: 3.7 MMOL/L (ref 3.5–5.1)
PROT SERPL-MCNC: 5.6 GM/DL (ref 6–8.4)
RBC # BLD AUTO: 3.21 M/UL (ref 4–5.4)
SODIUM SERPL-SCNC: 135 MMOL/L (ref 136–145)
WBC # BLD AUTO: 12.66 K/UL (ref 3.9–12.7)

## 2025-06-05 PROCEDURE — 25000003 PHARM REV CODE 250: Performed by: FAMILY MEDICINE

## 2025-06-05 PROCEDURE — 97110 THERAPEUTIC EXERCISES: CPT

## 2025-06-05 PROCEDURE — 63600175 PHARM REV CODE 636 W HCPCS: Performed by: SURGERY

## 2025-06-05 PROCEDURE — A9698 NON-RAD CONTRAST MATERIALNOC: HCPCS | Performed by: SURGERY

## 2025-06-05 PROCEDURE — 85027 COMPLETE CBC AUTOMATED: CPT | Performed by: SURGERY

## 2025-06-05 PROCEDURE — 97116 GAIT TRAINING THERAPY: CPT

## 2025-06-05 PROCEDURE — 80053 COMPREHEN METABOLIC PANEL: CPT | Performed by: SURGERY

## 2025-06-05 PROCEDURE — 84100 ASSAY OF PHOSPHORUS: CPT | Performed by: SURGERY

## 2025-06-05 PROCEDURE — 25500020 PHARM REV CODE 255: Performed by: SURGERY

## 2025-06-05 PROCEDURE — 83735 ASSAY OF MAGNESIUM: CPT | Performed by: SURGERY

## 2025-06-05 PROCEDURE — 97530 THERAPEUTIC ACTIVITIES: CPT

## 2025-06-05 PROCEDURE — 25000003 PHARM REV CODE 250: Performed by: SURGERY

## 2025-06-05 RX ORDER — OXYCODONE HYDROCHLORIDE 5 MG/1
5 TABLET ORAL EVERY 4 HOURS PRN
Qty: 20 TABLET | Refills: 0 | Status: SHIPPED | OUTPATIENT
Start: 2025-06-05

## 2025-06-05 RX ORDER — METOCLOPRAMIDE 5 MG/1
5 TABLET ORAL
Qty: 90 TABLET | Refills: 0 | Status: SHIPPED | OUTPATIENT
Start: 2025-06-05

## 2025-06-05 RX ADMIN — VALSARTAN 160 MG: 160 TABLET, FILM COATED ORAL at 09:06

## 2025-06-05 RX ADMIN — IOHEXOL 1000 ML: 12 SOLUTION ORAL at 09:06

## 2025-06-05 RX ADMIN — AMLODIPINE BESYLATE 10 MG: 10 TABLET ORAL at 09:06

## 2025-06-05 RX ADMIN — ACETAMINOPHEN 1000 MG: 500 TABLET ORAL at 05:06

## 2025-06-05 RX ADMIN — FAMOTIDINE 20 MG: 20 TABLET, FILM COATED ORAL at 09:06

## 2025-06-05 RX ADMIN — IOHEXOL 100 ML: 350 INJECTION, SOLUTION INTRAVENOUS at 09:06

## 2025-06-05 RX ADMIN — IBUPROFEN 400 MG: 400 TABLET ORAL at 09:06

## 2025-06-05 RX ADMIN — METOCLOPRAMIDE 10 MG: 5 INJECTION, SOLUTION INTRAMUSCULAR; INTRAVENOUS at 05:06

## 2025-06-05 RX ADMIN — IBUPROFEN 400 MG: 400 TABLET ORAL at 02:06

## 2025-06-05 RX ADMIN — HYDROCHLOROTHIAZIDE 12.5 MG: 12.5 TABLET ORAL at 09:06

## 2025-06-05 RX ADMIN — SENNOSIDES 8.6 MG: 8.6 TABLET, FILM COATED ORAL at 09:06

## 2025-06-05 NOTE — PT/OT/SLP PROGRESS
Occupational Therapy  Treatment    Chel Thompson   MRN: [id number]   Admitting Diagnosis: Intraductal papillary mucinous neoplasm of pancreas    OT Date of Treatment: 06/05/25   OT Start Time: 1025  OT Stop Time: 1050  OT Total Time (min): 25 min    Billable Minutes:  Therapeutic Activity 15 minutes and Therapeutic Exercise 10 minutes    OT/DEIRDRE: OT     Number of DEIRDRE visits since last OT visit: 0    General Precautions: Standard, fall  Orthopedic Precautions: N/A  Braces: N/A  Respiratory Status: Room air         Subjective:  Communicated with nurse and epic chart review prior to session.    Pain/Comfort  Pain Rating 1: 0/10    Objective:  Patient found with: telemetry     Functional Mobility:  Bed Mobility:  Mod (I) with supine< sit   (I) with seated scooting forward    Transfers:   Mod (I) with sit<>stand tranfers    Functional Ambulation:   (S) with  300 feet without ae and no lob , sob or dizziness    Activities of Daily Living:   UE dressing (s) for retrieval  for hospital robe  Balance:   Static Sit: GOOD-: Takes MODERATE challenges from all directions but inconsistently  Dynamic Sit: GOOD-: Incosistently Maintains balance through MODERATE excursions of active trunk movement,     Static Stand: FAIR+: Takes MINIMAL challenges from all directions  Dynamic stand: FAIR+: Needs CLOSE SUPERVISION during gait and is able to right self with minor LOB    Therapeutic Activities and Exercises:  Educated patient on importance of increased tolerance to upright position and direct impact on CV endurance and strength. Patient encouraged to sit up in chair for a minimum of 2 consecutive hours including for all meals. Pt educated on hep. Pt performed 1 set x 15 reps b ue rom exercise (shoulder flexion, chest press, elbow flex/ ext, hand/ digits flex/ext). Encouraged patient to perform AROM TE to BUE throughout the day within all available planes of motion. Re enforced importance of utilizing call light to meet needs  "in room and not attempt to get up without staff assistance. Patient verbalized understanding and agreed to comply.           AM-PAC 6 CLICK ADL   How much help from another person does this patient currently need?   1 = Unable, Total/Dependent Assistance  2 = A lot, Maximum/Moderate Assistance  3 = A little, Minimum/Contact Guard/Supervision  4 = None, Modified [place]/Independent    Putting on and taking off regular lower body clothing? : 3  Bathing (including washing, rinsing, drying)?: 3  Toileting, which includes using toilet, bedpan, or urinal? : 3  Putting on and taking off regular upper body clothing?: 3  Taking care of personal grooming such as brushing teeth?: 4  Eating meals?: 4  Daily Activity Total Score: 20     AM-PAC Raw Score CMS "G-Code Modifier Level of Impairment Assistance   6 % Total / Unable   7 - 8 CM 80 - 100% Maximal Assist   9-13 CL 60 - 80% Moderate Assist   14 - 19 CK 40 - 60% Moderate Assist   20 - 22 CJ 20 - 40% Minimal Assist   23 CI 1-20% SBA / CGA   24 CH 0% Independent/ Mod I       Patient left up in chair with all lines intact, call button in reach, and nurse notified    ASSESSMENT:  Chel Thompson is a 64 y.o. female with a medical diagnosis of Intraductal papillary mucinous neoplasm of pancreas and presents with debility and generalized weakness.      Rehab potential is good.    Activity tolerance: Good    Discharge recommendations: Low Intensity Therapy   Barriers to discharge: Barriers to Discharge: None    Equipment recommendations: none    GOALS:   Multidisciplinary Problems       Occupational Therapy Goals          Problem: Occupational Therapy    Goal Priority Disciplines Outcome Interventions   Occupational Therapy Goal     OT, PT/OT Progressing    Description: Goals to be met by: 06/13/25    Patient will increase functional independence with ADLs by performing:    Toileting from toilet with Stand-by Assistance for hygiene and clothing management. "   Step transfer with Stand-by Assistance.  Increased functional strength by 1/2 MMT grade.                         Plan:  Patient to be seen 2 x/week to address the above listed problems via self-care/home management, therapeutic activities, therapeutic exercises  Plan of Care expires: 06/12/25  Plan of Care reviewed with: patient         06/05/2025

## 2025-06-05 NOTE — DISCHARGE SUMMARY
[facility]  General Surgery  Discharge Summary      Patient Name: Chel Thompson  MRN: [id number]  Admission Date: 5/29/2025  Hospital Length of Stay: 7 days  Discharge Date and Time: 06/05/2025 11:43 AM  Attending Physician: Yolie Lieberman MD   Discharging Provider: Yolie Lieberman MD  Primary Care Provider: Yesica Burk MD    HPI:   Chel Thompson is a 64 y.o. female with history of HTN who presents today for evaluation and management of main duct- IPMN.       She has a remote history of pancreatitis with 3 bouts of pancreatitis back in 2010.  She states that she was not drinking at that time and that nobody was ever able to tell her why she was having pancreatitis.  It sounds like it was shocked up has been idiopathic.  Then about 3 years later in 2013 she states she was on the way to work and suddenly had sudden onset of abdominal/chest pain was taken to the hospital and an extensive workup was done which ultimately revealed an additional episode of pancreatitis.  Around that time she had imaging done in the hospital either a CT or an MRI however is uncertain what it showed.  That was done at [facility].  She unfortunately does not have any records of that either.  Following that bout of pancreatitis she has never had any additional episodes or issues.     She was in her usual state of good health up until a few months ago when she started having right lower extremity pain for which she went to the [facility] in [place].  MRI of the lumbar spine was ordered which showed an evidence of degenerative changes but also showed what was concerning for a multilocular cystic mass in the head of the pancreas measuring approximately 4.4 cm which was incompletely characterized.  A subsequent MRI/MRCP of the abdomen was ordered and done on 11/26/2024.  This showed dilation of the main pancreatic duct in the pancreatic head and proximal body measuring up to 1.9  cm, increased compared to 2013.  There was also non masslike enhancement of the pancreatic head.  She was then referred to GI who she saw on 02/11/2025.  They referred her to advanced endoscopy in [place] for an EUS.  That was done on 02/26/2025 by Dr. Mtz and revealed gaping ampulla with visible extrusion of mucin as well as dilated pancreatic duct in the pancreatic head in January of the pancreas measuring up to 20 mm in diameter, with tapering of and of the ductal dilatation in the body/tail.  There were no intraductal nodule seen on EUS in no pancreatic mass seen.     She is otherwise healthy.  She is a nonsmoker.  She drinks alcohol on rare occasions.  Family history is significant for prostate cancer in her brother as well as lung cancer in her maternal grandfather.  Otherwise no significant family history aside from what is noted below.  She presents for whipple today.     Procedure(s) (LRB):  WHIPPLE PROCEDURE (N/A)      Indwelling Lines/Drains at time of discharge:   Lines/Drains/Airways       Peripherally Inserted Central Catheter Line  Duration             PICC Double Lumen 05/31/25 1312 right basilic 4 days              Drain  Duration                  Closed/Suction Drain 05/29/25 Tube - 1 Right RUQ Bulb 19 Fr. 7 days         Closed/Suction Drain 05/29/25 Tube - 2 Lateral;Right RUQ Bulb 19 Fr. 7 days                  Hospital Course: 5/30/2025:  POD #1- doing well. NGT output bloody appearing, but minimal and hgb stable.  KEVIN drains serosanguinous.  Drain amylase ordered and pending. Anand removed and voiding.      5/31/2025:  POD #2- doing well.  Straight cath x2 overnight- anand replaced today.  KEVIN drains serosang- but amylases elevated yesterday.  NGT bilious today without evidence of bleeding.      6/1/2025:  POD #3- doing ok.  NGT removed yesterday after gravity trial.  Started on CLD.  No nausea/ vomiting. Still awaiting flatus.  Pulling 750 on IS.  Drain amylases down to 875 and  205    6/2/2025:  POD 4- still awaiting return of bowel function.  Advancing to FLD.  Mckeon out again.      6/3/2025:  POD #5- had BM.  Diet advanced to regular.  PCA stopped and started on PO medications.  Drain amylases checked and normalized    6/4/2025:  POD #6- doing well.  One episode emesis overnight.       6/5/2025:  POD #7- doing well.  CT scan today without any undrained fluid collections.  Tolerating regular diet.  KEVIN drains removed.  Appropriate for d/c home     Goals of Care Treatment Preferences:  Code Status: Full Code      Consults:   Consults (From admission, onward)          Status Ordering Provider     Inpatient consult to Hospitalist  Once        Provider:  Gary Dubose MD    Acknowledged ZIYAD VELEZ     Inpatient consult to PICC team ([facility])  Once        Provider:  (Not yet assigned)    Completed ZIYAD VELEZ     Consult to Case Management/Social Work  Once        Provider:  (Not yet assigned)    Completed KERRIE CARD            Significant Diagnostic Studies: Labs: BMP:   Recent Labs   Lab 06/04/25  0202 06/04/25  0639 06/05/25  0254   GLU 93 93 74   * 136 135*   K 3.9 3.6 3.7    103 103   CO2 24 23 21*   BUN 16 15 15   CREATININE 0.6 0.6 0.6   CALCIUM 8.0* 8.3* 8.4*   MG  --  2.4 2.0   , CMP   Recent Labs   Lab 06/04/25  0202 06/04/25  0639 06/05/25  0254   * 136 135*   K 3.9 3.6 3.7    103 103   CO2 24 23 21*   GLU 93 93 74   BUN 16 15 15   CREATININE 0.6 0.6 0.6   CALCIUM 8.0* 8.3* 8.4*   PROT 5.5* 5.7* 5.6*   ALBUMIN 2.1* 2.2* 2.3*   BILITOT 1.4* 1.4* 0.9   ALKPHOS 100 99 95   * 159* 239*   * 263* 457*   ANIONGAP 7* 10 11   , CBC   Recent Labs   Lab 06/04/25  0202 06/04/25 0639 06/05/25  0254   WBC 10.27 10.61 12.66   HGB 9.6* 10.1* 9.7*   HCT 28.4* 29.8* 28.7*    230 255   , and All labs within the past 24 hours have been reviewed    Pending Diagnostic Studies:       Procedure Component Value Units Date/Time     Protime-INR [[id number]]     Order Status: Sent Lab Status: No result     Specimen: Blood           Final Active Diagnoses:    Diagnosis Date Noted POA    PRINCIPAL PROBLEM:  Intraductal papillary mucinous neoplasm of pancreas [D49.0] 03/04/2025 Yes    Transaminitis [R74.01] 05/31/2025 Yes    H/O acute pancreatitis [Z87.19] 05/29/2025 Not Applicable    Hypertension [I10] 05/30/2023 Yes      Problems Resolved During this Admission:    Diagnosis Date Noted Date Resolved POA    Urinary retention [R33.9] 05/31/2025 06/03/2025 No      Discharged Condition: good    Disposition: Home-Health Care [facility]    Follow Up:   Contact information for follow-up providers       Yesica Burk MD Follow up in 2 week(s).    Specialty: Family Medicine  Contact information:  [address]  [place] [address] [phone]                       Contact information for after-discharge care       Home Medical Care       [facility] .    Service: Home Health Services  Contact information:  [address]  [facility] [address] [phone]                                 Patient Instructions:      Diet Adult Regular     Notify your health care provider if you experience any of the following:  temperature >100.4     Notify your health care provider if you experience any of the following:  persistent nausea and vomiting or diarrhea     Notify your health care provider if you experience any of the following:  severe uncontrolled pain     Notify your health care provider if you experience any of the following:  redness, tenderness, or signs of infection (pain, swelling, redness, odor or green/yellow discharge around incision site)     No dressing needed     Activity as tolerated     Medications:  Reconciled Home Medications:      Medication List        START taking these medications      metoclopramide HCl 5 MG tablet  Commonly known as: REGLAN  Take 1 tablet (5 mg total) by mouth 3 (three)  times daily before meals.     oxyCODONE 5 MG immediate release tablet  Commonly known as: ROXICODONE  Take 1 tablet (5 mg total) by mouth every 4 (four) hours as needed for Pain.            CONTINUE taking these medications      cyclobenzaprine 5 MG tablet  Commonly known as: FLEXERIL  Take 1 tablet (5 mg total) by mouth 3 (three) times daily as needed for Muscle spasms.     traZODone 50 MG tablet  Commonly known as: DESYREL  Take 1 tablet (50 mg total) by mouth every evening.     valsartan-hydrochlorothiazide 160-12.5 mg per tablet  Commonly known as: DIOVAN-HCT  Take 1 tablet by mouth every morning.            Time spent on the discharge of patient: 20 minutes    Yolie Lieberman MD  General Surgery  [facility]

## 2025-06-05 NOTE — PLAN OF CARE
[facility]  Discharge Final Note    Primary Care Provider: Yesica Burk MD    Expected Discharge Date: 06/05/2025    Final Discharge Note (most recent)       Final Note - 06/05/25 1121          Final Note    Assessment Type Final Discharge Note     Anticipated Discharge Disposition Home-Health Care [facility]     Hospital Resources/Appts/Education Provided Appointments scheduled and added to AVS;Post-Acute resouces added to AVS        Post-Acute Status    Post-Acute Authorization Home Health     Home Health Status Set-up Complete/Auth obtained     Discharge Delays None known at this time                      Follow-up providers       Yesica Burk MD   Specialty: Family Medicine   Relationship: PCP - General    [address]  [place] [address]   Phone: [phone]       Next Steps: Follow up in 2 week(s)              After-discharge care                Home Medical Care       *[facility]   Service: Home Health Services    [address]  [place] [address]   Phone: [phone]                             Sw met with pt this morning to discuss treatment team recommendations for HH upon d/c. Pt is agreeable and stated she did not have a HH preference. Pt confirmed she has an [facility] PCP. Sw discussed sending referral to [facility] since pt had [facility] PCP arranged already; pt is agreeable.     [facility] accepted.     Patient has no d/c needs at this time. Sw to follow up, as needed, for d/c planning purposes.

## 2025-06-05 NOTE — PT/OT/SLP PROGRESS
"Physical Therapy  Treatment    Chel Thompson   MRN: [id number]   Admitting Diagnosis: Intraductal papillary mucinous neoplasm of pancreas    PT Received On: 06/05/25  PT Start Time: 1100     PT Stop Time: 1125    PT Total Time (min): 25 min       Billable Minutes:  Gait Training 15 and Therapeutic Exercise 10    Treatment Type: Treatment  PT/PTA: PT     Number of PTA visits since last PT visit: 0       General Precautions: Standard, fall  Orthopedic Precautions: N/A  Braces: N/A  Respiratory Status: Room air    Spiritual, Cultural Beliefs, Religion Practices, Values that Affect Care: no    Subjective:  Communicated with NURSE MARISA AND EPIC CHART REVIEW  prior to session.  PT AGREED TO TX     Pain/Comfort  Pain Rating 1: 0/10  Pain Rating Post-Intervention 1: 0/10    Objective:   Patient found with: telemetry, KEVIN drain, PICC line    Functional Mobility:  PT MET IN RM SUP>SIT EOB IND. PT SCOOT TO EOB AND GT. BELT AND  SOCKS DONNED PRIOR TO OOB MOBILITY.  PT STOOD WITH NO AD AND GT TRAINED X 450' WITH CGA WITH INC FATIGUE NOTED AND INC SWAY WITH FATIGUE. PT REPORTED " I ORDERED A RW FOR HOME USE." PT RETURNED TO RM T/F TO CHAIR WITH SBA.     Treatment and Education:  PT COMPLETED 15 REPS OF AP, TKE AND MIP FOR LE STRENGTHENING AND ROM .  PT EDUCATED ON "CALL DON'T FALL", ENCOURAGED TO CALL FOR ASSISTANCE WITH ALL NEEDS FOR OOB MOBILITY.       AM-PAC 6 CLICK MOBILITY  How much help from another person does this patient currently need?   1 = Unable, Total/Dependent Assistance  2 = A lot, Maximum/Moderate Assistance  3 = A little, Minimum/Contact Guard/Supervision  4 = None, Modified [place]/Independent    Turning over in bed (including adjusting bedclothes, sheets and blankets)?: 4  Sitting down on and standing up from a chair with arms (e.g., wheelchair, bedside commode, etc.): 4  Moving from lying on back to sitting on the side of the bed?: 4  Moving to and from a bed to a chair (including a " wheelchair)?: 4  Need to walk in hospital room?: 3  Climbing 3-5 steps with a railing?: 1  Basic Mobility Total Score: 20    AM-PAC Raw Score CMS G-Code Modifier Level of Impairment Assistance   6 % Total / Unable   7 - 9 CM 80 - 100% Maximal Assist   10 - 14 CL 60 - 80% Moderate Assist   15 - 19 CK 40 - 60% Moderate Assist   20 - 22 CJ 20 - 40% Minimal Assist   23 CI 1-20% SBA / CGA   24 CH 0% Independent/ Mod I     Patient left up in chair with call button in reach.    Assessment:  PT PROGRESSING WITH GT.     Rehab identified problem list/impairments: weakness, impaired endurance, impaired self care skills, impaired functional mobility, gait instability, decreased lower extremity function, impaired balance    Rehab potential is good.    Activity tolerance: Good    Discharge recommendations: Low Intensity Therapy      Barriers to discharge:      Equipment recommendations: none, walker, rolling     GOALS:   Multidisciplinary Problems       Physical Therapy Goals          Problem: Physical Therapy    Goal Priority Disciplines Outcome Interventions   Physical Therapy Goal     PT, PT/OT Progressing    Description: LTG'S TO BE MET IN 14 DAYS (6-13-25)  PT WILL BE JANETTE FOR BED MOBILITY  PT WILL BE JANETTE FOR BED<>CHAIR TF'S  PT WILL  FEET WITH OR WITHOUT RW AND JANETTE  PT WILL INC AMPAC SCORE BY 2 POINTS TO PROGRESS GROSS FUNC MOBILITY                         DME Justifications:  No DME recommended requiring DME justifications    PLAN:    Patient to be seen 3 x/week to address the above listed problems via gait training, therapeutic activities, therapeutic exercises  Plan of Care expires: 06/13/25  Plan of Care reviewed with: patient         06/05/2025

## 2025-06-05 NOTE — HPI
Chel Thompson is a 64 y.o. female with history of HTN who presents today for evaluation and management of main duct- IPMN.       She has a remote history of pancreatitis with 3 bouts of pancreatitis back in 2010.  She states that she was not drinking at that time and that nobody was ever able to tell her why she was having pancreatitis.  It sounds like it was shocked up has been idiopathic.  Then about 3 years later in 2013 she states she was on the way to work and suddenly had sudden onset of abdominal/chest pain was taken to the hospital and an extensive workup was done which ultimately revealed an additional episode of pancreatitis.  Around that time she had imaging done in the hospital either a CT or an MRI however is uncertain what it showed.  That was done at [facility].  She unfortunately does not have any records of that either.  Following that bout of pancreatitis she has never had any additional episodes or issues.     She was in her usual state of good health up until a few months ago when she started having right lower extremity pain for which she went to the [facility] in [place].  MRI of the lumbar spine was ordered which showed an evidence of degenerative changes but also showed what was concerning for a multilocular cystic mass in the head of the pancreas measuring approximately 4.4 cm which was incompletely characterized.  A subsequent MRI/MRCP of the abdomen was ordered and done on 11/26/2024.  This showed dilation of the main pancreatic duct in the pancreatic head and proximal body measuring up to 1.9 cm, increased compared to 2013.  There was also non masslike enhancement of the pancreatic head.  She was then referred to GI who she saw on 02/11/2025.  They referred her to advanced endoscopy in [place] for an EUS.  That was done on 02/26/2025 by Dr. Mtz and revealed gaping ampulla with visible extrusion of mucin as well as dilated pancreatic duct  in the pancreatic head in January of the pancreas measuring up to 20 mm in diameter, with tapering of and of the ductal dilatation in the body/tail.  There were no intraductal nodule seen on EUS in no pancreatic mass seen.     She is otherwise healthy.  She is a nonsmoker.  She drinks alcohol on rare occasions.  Family history is significant for prostate cancer in her brother as well as lung cancer in her maternal grandfather.  Otherwise no significant family history aside from what is noted below.  She presents for whipple today.

## 2025-06-06 ENCOUNTER — PATIENT MESSAGE (OUTPATIENT)
Dept: ADMINISTRATIVE | Facility: CLINIC | Age: 65
End: 2025-06-06
Payer: COMMERCIAL

## 2025-06-06 ENCOUNTER — PATIENT OUTREACH (OUTPATIENT)
Dept: ADMINISTRATIVE | Facility: CLINIC | Age: 65
End: 2025-06-06
Payer: COMMERCIAL

## 2025-06-06 ENCOUNTER — TELEPHONE (OUTPATIENT)
Dept: SURGICAL ONCOLOGY | Facility: CLINIC | Age: 65
End: 2025-06-06
Payer: COMMERCIAL

## 2025-06-06 PROCEDURE — G0180 MD CERTIFICATION HHA PATIENT: HCPCS | Mod: ,,, | Performed by: SURGERY

## 2025-06-10 ENCOUNTER — TELEPHONE (OUTPATIENT)
Dept: SURGICAL ONCOLOGY | Facility: CLINIC | Age: 65
End: 2025-06-10
Payer: COMMERCIAL

## 2025-06-10 ENCOUNTER — HOSPITAL ENCOUNTER (INPATIENT)
Facility: HOSPITAL | Age: 65
LOS: 2 days | Discharge: HOME HEALTH | DRG: 862 | End: 2025-06-12
Attending: SURGERY | Admitting: SURGERY
Payer: COMMERCIAL

## 2025-06-10 ENCOUNTER — PATIENT MESSAGE (OUTPATIENT)
Dept: SURGICAL ONCOLOGY | Facility: CLINIC | Age: 65
End: 2025-06-10
Payer: COMMERCIAL

## 2025-06-10 DIAGNOSIS — D49.0 INTRADUCTAL PAPILLARY MUCINOUS NEOPLASM OF PANCREAS: Primary | ICD-10-CM

## 2025-06-10 DIAGNOSIS — K65.1 INTRA-ABDOMINAL ABSCESS POST-PROCEDURE: ICD-10-CM

## 2025-06-10 DIAGNOSIS — T81.43XA INTRA-ABDOMINAL ABSCESS POST-PROCEDURE: ICD-10-CM

## 2025-06-10 DIAGNOSIS — T81.49XA POST-OPERATIVE WOUND ABSCESS: ICD-10-CM

## 2025-06-10 PROBLEM — R18.8 INTRAABDOMINAL FLUID COLLECTION: Status: ACTIVE | Noted: 2025-06-10

## 2025-06-10 LAB
ABSOLUTE EOSINOPHIL (OHS): 0.17 K/UL
ABSOLUTE MONOCYTE (OHS): 1.18 K/UL (ref 0.3–1)
ABSOLUTE NEUTROPHIL COUNT (OHS): 12.51 K/UL (ref 1.8–7.7)
ALBUMIN SERPL BCP-MCNC: 2.9 G/DL (ref 3.5–5.2)
ALP SERPL-CCNC: 110 UNIT/L (ref 40–150)
ALT SERPL W/O P-5'-P-CCNC: 189 UNIT/L (ref 10–44)
ANION GAP (OHS): 11 MMOL/L (ref 8–16)
AST SERPL-CCNC: 35 UNIT/L (ref 11–45)
BASOPHILS # BLD AUTO: 0.06 K/UL
BASOPHILS NFR BLD AUTO: 0.4 %
BILIRUB SERPL-MCNC: 0.6 MG/DL (ref 0.1–1)
BUN SERPL-MCNC: 36 MG/DL (ref 8–23)
CALCIUM SERPL-MCNC: 8.9 MG/DL (ref 8.7–10.5)
CHLORIDE SERPL-SCNC: 101 MMOL/L (ref 95–110)
CO2 SERPL-SCNC: 23 MMOL/L (ref 23–29)
CREAT SERPL-MCNC: 0.8 MG/DL (ref 0.5–1.4)
ERYTHROCYTE [DISTWIDTH] IN BLOOD BY AUTOMATED COUNT: 13.7 % (ref 11.5–14.5)
GFR SERPLBLD CREATININE-BSD FMLA CKD-EPI: >60 ML/MIN/1.73/M2
GLUCOSE SERPL-MCNC: 101 MG/DL (ref 70–110)
HCT VFR BLD AUTO: 36.4 % (ref 37–48.5)
HGB BLD-MCNC: 11.8 GM/DL (ref 12–16)
IMM GRANULOCYTES # BLD AUTO: 0.16 K/UL (ref 0–0.04)
IMM GRANULOCYTES NFR BLD AUTO: 1 % (ref 0–0.5)
LYMPHOCYTES # BLD AUTO: 1.88 K/UL (ref 1–4.8)
MCH RBC QN AUTO: 29.8 PG (ref 27–31)
MCHC RBC AUTO-ENTMCNC: 32.4 G/DL (ref 32–36)
MCV RBC AUTO: 92 FL (ref 82–98)
NUCLEATED RBC (/100WBC) (OHS): 0 /100 WBC
PLATELET # BLD AUTO: 572 K/UL (ref 150–450)
PLATELET BLD QL SMEAR: ABNORMAL
PMV BLD AUTO: 8.9 FL (ref 9.2–12.9)
POTASSIUM SERPL-SCNC: 3.8 MMOL/L (ref 3.5–5.1)
PROT SERPL-MCNC: 7.1 GM/DL (ref 6–8.4)
RBC # BLD AUTO: 3.96 M/UL (ref 4–5.4)
RELATIVE EOSINOPHIL (OHS): 1.1 %
RELATIVE LYMPHOCYTE (OHS): 11.8 % (ref 18–48)
RELATIVE MONOCYTE (OHS): 7.4 % (ref 4–15)
RELATIVE NEUTROPHIL (OHS): 78.3 % (ref 38–73)
SODIUM SERPL-SCNC: 135 MMOL/L (ref 136–145)
WBC # BLD AUTO: 15.96 K/UL (ref 3.9–12.7)

## 2025-06-10 PROCEDURE — 87040 BLOOD CULTURE FOR BACTERIA: CPT | Mod: 91 | Performed by: SURGERY

## 2025-06-10 PROCEDURE — 36415 COLL VENOUS BLD VENIPUNCTURE: CPT | Performed by: SURGERY

## 2025-06-10 PROCEDURE — 63600175 PHARM REV CODE 636 W HCPCS: Performed by: SURGERY

## 2025-06-10 PROCEDURE — 80053 COMPREHEN METABOLIC PANEL: CPT | Performed by: SURGERY

## 2025-06-10 PROCEDURE — 85025 COMPLETE CBC W/AUTO DIFF WBC: CPT | Performed by: SURGERY

## 2025-06-10 PROCEDURE — 25000003 PHARM REV CODE 250: Performed by: SURGERY

## 2025-06-10 PROCEDURE — 25500020 PHARM REV CODE 255: Performed by: SURGERY

## 2025-06-10 RX ORDER — OXYCODONE HYDROCHLORIDE 5 MG/1
10 TABLET ORAL EVERY 4 HOURS PRN
Status: DISCONTINUED | OUTPATIENT
Start: 2025-06-10 | End: 2025-06-12 | Stop reason: HOSPADM

## 2025-06-10 RX ORDER — ACETAMINOPHEN 325 MG/1
650 TABLET ORAL EVERY 8 HOURS PRN
Status: DISCONTINUED | OUTPATIENT
Start: 2025-06-10 | End: 2025-06-12 | Stop reason: HOSPADM

## 2025-06-10 RX ORDER — ACETAMINOPHEN 325 MG/1
650 TABLET ORAL EVERY 4 HOURS PRN
Status: DISCONTINUED | OUTPATIENT
Start: 2025-06-10 | End: 2025-06-12 | Stop reason: HOSPADM

## 2025-06-10 RX ORDER — METOCLOPRAMIDE HYDROCHLORIDE 5 MG/ML
10 INJECTION INTRAMUSCULAR; INTRAVENOUS EVERY 8 HOURS
Status: DISCONTINUED | OUTPATIENT
Start: 2025-06-10 | End: 2025-06-12

## 2025-06-10 RX ORDER — TALC
6 POWDER (GRAM) TOPICAL NIGHTLY PRN
Status: DISCONTINUED | OUTPATIENT
Start: 2025-06-10 | End: 2025-06-12 | Stop reason: HOSPADM

## 2025-06-10 RX ORDER — LIDOCAINE HYDROCHLORIDE 10 MG/ML
1 INJECTION, SOLUTION EPIDURAL; INFILTRATION; INTRACAUDAL; PERINEURAL ONCE AS NEEDED
Status: DISCONTINUED | OUTPATIENT
Start: 2025-06-10 | End: 2025-06-12 | Stop reason: HOSPADM

## 2025-06-10 RX ORDER — OXYCODONE HYDROCHLORIDE 5 MG/1
5 TABLET ORAL EVERY 4 HOURS PRN
Status: DISCONTINUED | OUTPATIENT
Start: 2025-06-10 | End: 2025-06-12 | Stop reason: HOSPADM

## 2025-06-10 RX ORDER — SODIUM CHLORIDE 9 MG/ML
INJECTION, SOLUTION INTRAVENOUS CONTINUOUS
Status: DISCONTINUED | OUTPATIENT
Start: 2025-06-10 | End: 2025-06-12

## 2025-06-10 RX ORDER — ONDANSETRON 8 MG/1
8 TABLET, ORALLY DISINTEGRATING ORAL EVERY 8 HOURS PRN
Status: DISCONTINUED | OUTPATIENT
Start: 2025-06-10 | End: 2025-06-12 | Stop reason: HOSPADM

## 2025-06-10 RX ORDER — SODIUM CHLORIDE 0.9 % (FLUSH) 0.9 %
10 SYRINGE (ML) INJECTION
Status: DISCONTINUED | OUTPATIENT
Start: 2025-06-10 | End: 2025-06-12 | Stop reason: HOSPADM

## 2025-06-10 RX ADMIN — METOCLOPRAMIDE 10 MG: 5 INJECTION, SOLUTION INTRAMUSCULAR; INTRAVENOUS at 11:06

## 2025-06-10 RX ADMIN — IOHEXOL 100 ML: 350 INJECTION, SOLUTION INTRAVENOUS at 01:06

## 2025-06-10 RX ADMIN — PIPERACILLIN SODIUM AND TAZOBACTAM SODIUM 4.5 G: 4; .5 INJECTION, POWDER, FOR SOLUTION INTRAVENOUS at 02:06

## 2025-06-10 RX ADMIN — PIPERACILLIN SODIUM AND TAZOBACTAM SODIUM 4.5 G: 4; .5 INJECTION, POWDER, FOR SOLUTION INTRAVENOUS at 11:06

## 2025-06-10 RX ADMIN — SODIUM CHLORIDE: 9 INJECTION, SOLUTION INTRAVENOUS at 02:06

## 2025-06-10 NOTE — TELEPHONE ENCOUNTER
Spoke with patient per [employer] message. MD advises direct admission. Notified patient to check in at the outpatient admit area. She verbalized understanding.

## 2025-06-10 NOTE — ASSESSMENT & PLAN NOTE
Status post Whipple almost 2 weeks ago, clinically doing well, however with purulent drainage from previous drain site and mild leukocytosis up to 15 K. suspect subclinical pancreatic leak versus residual fat necrosis.  Imaging reviewed with Interventional Radiology today.  Plan for IR aspiration and possible drain placement tomorrow into the hilar fluid collection.    --clear liquid diet today, NPO after midnight  -- Zosyn  -- plan for IR tomorrow for aspiration/drain placement  -- local wound care  -- PRN medications for pain control and nausea   -- encourage ambulation and IS  Dispo:  continued med/surg     SCDs for DVT ppx

## 2025-06-10 NOTE — HPI
Chel Thompson is a 64 y.o. female s/p whipple on 5/29/2025 for MD-IPMN, for purulent drainage from previous drain site.  Postoperatively she initially did very well.  She had a elevated drain amylase but that decreased down to normal, CT scan done on day of discharge showed no undrained fluid collections, and the drain output was serosanguineous.  Her KEVIN drains were removed prior to discharge and she was discharged home on postoperative day 7.  She has been doing very well at home and then last night noticed a change in the character and quantity of output from the previous KEVIN drain site.  She states that last night it changed from a minimal serous color to larger amounts of purulent exudate.  She was seen by her home health nurse today who sent him picture to our office and from there she was direct admitted to the hospital.  Upon admission she was afebrile, vitals were within normal limits.  Labs did show a mild leukocytosis up to 15 K, otherwise they are unremarkable.  CT scan was done with IV contrast which showed interval development of a fluid collection in the hepatic hilum extending into the retroperitoneum concerning for biloma versus abscess versus seroma.  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  She denies any fevers, chills, nausea, vomiting, or changes in bowel or bladder.  She states she has been eating well and overall still feels very well.

## 2025-06-10 NOTE — SUBJECTIVE & OBJECTIVE
No current facility-administered medications on file prior to encounter.     Current Outpatient Medications on File Prior to Encounter   Medication Sig    cyclobenzaprine (FLEXERIL) 5 MG tablet Take 1 tablet (5 mg total) by mouth 3 (three) times daily as needed for Muscle spasms.    metoclopramide HCl (REGLAN) 5 MG tablet Take 1 tablet (5 mg total) by mouth 3 (three) times daily before meals.    oxyCODONE (ROXICODONE) 5 MG immediate release tablet Take 1 tablet (5 mg total) by mouth every 4 (four) hours as needed for Pain.    traZODone (DESYREL) 50 MG tablet Take 1 tablet (50 mg total) by mouth every evening.    valsartan-hydrochlorothiazide (DIOVAN-HCT) 160-12.5 mg per tablet Take 1 tablet by mouth every morning.       Review of patient's allergies indicates:   Allergen Reactions    Dilaudid [hydromorphone] Itching     From IV    Nitrofurantoin Other (See Comments)     Cramping of legs       Past Medical History:   Diagnosis Date    Herpes genitalis in women 05/27/2025    Hypertension     Hypertensive heart disease without heart failure 02/04/2021    Intraductal papillary mucinous neoplasm of pancreas 03/04/2025    Pancreatitis     4 times (2009,2013)    UTI (urinary tract infection)      Past Surgical History:   Procedure Laterality Date    COLONOSCOPY N/A 8/22/2024    Procedure: COLONOSCOPY;  Surgeon: Kya Garcia MD;  Location: [facility] ENDO;  Service: Endoscopy;  Laterality: N/A;    DILATION AND CURETTAGE OF UTERUS      ENDOSCOPIC ULTRASOUND OF UPPER GASTROINTESTINAL TRACT N/A 2/26/2025    Procedure: ULTRASOUND, UPPER GI TRACT, ENDOSCOPIC;  Surgeon: Abdi Mtz MD;  Location: [facility] ENDO;  Service: Endoscopy;  Laterality: N/A;  2/21 portal-EUS with anyone in next 2-3 weeks, either site should be ok. [id number]    MULTIPLE TOOTH EXTRACTIONS      vaginal delivery      varicose veins      WHIPPLE PROCEDURE N/A 5/29/2025    Procedure: WHIPPLE PROCEDURE;  Surgeon: Yolie Lieberman MD;  Location: [facility] OR;   Service: Oncology;  Laterality: N/A;     Family History       Problem Relation (Age of Onset)    Asthma Mother    COPD Brother    Cancer Maternal Grandmother, Maternal Grandfather    Hypertension Mother          Tobacco Use    Smoking status: Never    Smokeless tobacco: Never   Substance and Sexual Activity    Alcohol use: Not Currently     Comment: occasionally    Drug use: No    Sexual activity: Yes     Partners: Male     Review of Systems   Constitutional:  Negative for activity change, appetite change, chills, fever and unexpected weight change.   Respiratory:  Negative for chest tightness.    Cardiovascular:  Negative for chest pain.   Gastrointestinal:  Negative for abdominal pain, nausea and vomiting.     Objective:     Vital Signs (Most Recent):  Temp: 98.1 °F (36.7 °C) (06/10/25 1551)  Pulse: 96 (06/10/25 1551)  Resp: 18 (06/10/25 1551)  BP: (!) 110/59 (06/10/25 1551)  SpO2: (!) 93 % (06/10/25 1551) Vital Signs (24h Range):  Temp:  [98.1 °F (36.7 °C)-98.2 °F (36.8 °C)] 98.1 °F (36.7 °C)  Pulse:  [] 96  Resp:  [18] 18  SpO2:  [93 %-95 %] 93 %  BP: (101-110)/(59-64) 110/59     Weight: 87.9 kg (193 lb 12.6 oz)  Body mass index is 29.46 kg/m².     Physical Exam  Constitutional:       General: She is not in acute distress.     Appearance: Normal appearance.   HENT:      Head: Normocephalic and atraumatic.   Eyes:      Extraocular Movements: Extraocular movements intact.      Conjunctiva/sclera: Conjunctivae normal.   Cardiovascular:      Rate and Rhythm: Normal rate and regular rhythm.   Pulmonary:      Effort: Pulmonary effort is normal.   Abdominal:      General: Abdomen is flat. There is no distension.      Palpations: Abdomen is soft. There is no mass.      Tenderness: There is no abdominal tenderness. There is no guarding or rebound.      Hernia: No hernia is present.      Comments: Well healing midline incision, purulent drainage from upper of 2 KEVIN drain sites   Musculoskeletal:         General: No  swelling, tenderness, deformity or signs of injury. Normal range of motion.   Skin:     General: Skin is warm and dry.      Coloration: Skin is not jaundiced.   Neurological:      General: No focal deficit present.      Mental Status: She is alert and oriented to person, place, and time.   Psychiatric:         Mood and Affect: Mood normal.         Behavior: Behavior normal.         Thought Content: Thought content normal.            I have reviewed all pertinent lab results within the past 24 hours.  CBC:   Recent Labs   Lab 06/10/25  1218   WBC 15.96*   RBC 3.96*   HGB 11.8*   HCT 36.4*   *   MCV 92   MCH 29.8   MCHC 32.4     BMP:   Recent Labs   Lab 06/05/25  0254 06/10/25  1218   GLU 74 101   * 135*   K 3.7 3.8    101   CO2 21* 23   BUN 15 36*   CREATININE 0.6 0.8   CALCIUM 8.4* 8.9   MG 2.0  --      CMP:   Recent Labs   Lab 06/10/25  1218      CALCIUM 8.9   ALBUMIN 2.9*   PROT 7.1   *   K 3.8   CO2 23      BUN 36*   CREATININE 0.8   ALKPHOS 110   *   AST 35   BILITOT 0.6       Significant Diagnostics:  I have reviewed all pertinent imaging results/findings within the past 24 hours.  CT: I have reviewed all pertinent results/findings within the past 24 hours and my personal findings are:  Large fluid collection hepatic hilum, no clear tract between that fluid collection and the KEVIN drain site which does have a clear tract coming from it but no underlying fluid collection.  Unclear if this is fluid tracking down from the hepatic hilum out the KEVIN drain site or if this is just a small leak that is not visualized due to lack of the accumulation.

## 2025-06-10 NOTE — PLAN OF CARE
Discussed poc with pt, pt verbalized understanding    Purposeful rounding every 2hours    VS wnl  Fall precautions in place, remains injury free  Pt denies c/o pain    0.9 Nacl infusing at 125 ml/hr  Accurate I&Os  Abx given as prescribed  Bed locked at lowest position  Call light within reach    Chart check complete  Will cont with POC

## 2025-06-10 NOTE — H&P
[facility] Surg  General Surgery  History & Physical    Patient Name: Chel Thompson  MRN: [id number]  Admission Date: 6/10/2025  Attending Physician: Yolie Lieberman MD   Primary Care Provider: Yesica Burk MD    Patient information was obtained from patient, spouse/SO, past medical records, and ER records.     Subjective:     Chief Complaint: intra abdominal abscess     History of Present Illness: Chel Thompson is a 64 y.o. female s/p whipple on 5/29/2025 for MD-IPMN, for purulent drainage from previous drain site.  Postoperatively she initially did very well.  She had a elevated drain amylase but that decreased down to normal, CT scan done on day of discharge showed no undrained fluid collections, and the drain output was serosanguineous.  Her KEVIN drains were removed prior to discharge and she was discharged home on postoperative day 7.  She has been doing very well at home and then last night noticed a change in the character and quantity of output from the previous KEVIN drain site.  She states that last night it changed from a minimal serous color to larger amounts of purulent exudate.  She was seen by her home health nurse today who sent him picture to our office and from there she was direct admitted to the hospital.  Upon admission she was afebrile, vitals were within normal limits.  Labs did show a mild leukocytosis up to 15 K, otherwise they are unremarkable.  CT scan was done with IV contrast which showed interval development of a fluid collection in the hepatic hilum extending into the retroperitoneum concerning for biloma versus abscess versus seroma.  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  She denies any fevers, chills, nausea, vomiting, or changes in bowel or bladder.  She states she has been eating well and overall still feels very well.    No current facility-administered medications on file prior to encounter.     Current Outpatient  Medications on File Prior to Encounter   Medication Sig    cyclobenzaprine (FLEXERIL) 5 MG tablet Take 1 tablet (5 mg total) by mouth 3 (three) times daily as needed for Muscle spasms.    metoclopramide HCl (REGLAN) 5 MG tablet Take 1 tablet (5 mg total) by mouth 3 (three) times daily before meals.    oxyCODONE (ROXICODONE) 5 MG immediate release tablet Take 1 tablet (5 mg total) by mouth every 4 (four) hours as needed for Pain.    traZODone (DESYREL) 50 MG tablet Take 1 tablet (50 mg total) by mouth every evening.    valsartan-hydrochlorothiazide (DIOVAN-HCT) 160-12.5 mg per tablet Take 1 tablet by mouth every morning.       Review of patient's allergies indicates:   Allergen Reactions    Dilaudid [hydromorphone] Itching     From IV    Nitrofurantoin Other (See Comments)     Cramping of legs       Past Medical History:   Diagnosis Date    Herpes genitalis in women 05/27/2025    Hypertension     Hypertensive heart disease without heart failure 02/04/2021    Intraductal papillary mucinous neoplasm of pancreas 03/04/2025    Pancreatitis     4 times (2009,2013)    UTI (urinary tract infection)      Past Surgical History:   Procedure Laterality Date    COLONOSCOPY N/A 8/22/2024    Procedure: COLONOSCOPY;  Surgeon: Kya Garcia MD;  Location: [facility] ENDO;  Service: Endoscopy;  Laterality: N/A;    DILATION AND CURETTAGE OF UTERUS      ENDOSCOPIC ULTRASOUND OF UPPER GASTROINTESTINAL TRACT N/A 2/26/2025    Procedure: ULTRASOUND, UPPER GI TRACT, ENDOSCOPIC;  Surgeon: Abdi Mtz MD;  Location: [facility] ENDO;  Service: Endoscopy;  Laterality: N/A;  2/21 portal-EUS with anyone in next 2-3 weeks, either site should be ok. [id number]    MULTIPLE TOOTH EXTRACTIONS      vaginal delivery      varicose veins      WHIPPLE PROCEDURE N/A 5/29/2025    Procedure: WHIPPLE PROCEDURE;  Surgeon: Yolie Lieberman MD;  Location: [facility] OR;  Service: Oncology;  Laterality: N/A;     Family History       Problem Relation (Age of Onset)     Asthma Mother    COPD Brother    Cancer Maternal Grandmother, Maternal Grandfather    Hypertension Mother          Tobacco Use    Smoking status: Never    Smokeless tobacco: Never   Substance and Sexual Activity    Alcohol use: Not Currently     Comment: occasionally    Drug use: No    Sexual activity: Yes     Partners: Male     Review of Systems   Constitutional:  Negative for activity change, appetite change, chills, fever and unexpected weight change.   Respiratory:  Negative for chest tightness.    Cardiovascular:  Negative for chest pain.   Gastrointestinal:  Negative for abdominal pain, nausea and vomiting.     Objective:     Vital Signs (Most Recent):  Temp: 98.1 °F (36.7 °C) (06/10/25 1551)  Pulse: 96 (06/10/25 1551)  Resp: 18 (06/10/25 1551)  BP: (!) 110/59 (06/10/25 1551)  SpO2: (!) 93 % (06/10/25 1551) Vital Signs (24h Range):  Temp:  [98.1 °F (36.7 °C)-98.2 °F (36.8 °C)] 98.1 °F (36.7 °C)  Pulse:  [] 96  Resp:  [18] 18  SpO2:  [93 %-95 %] 93 %  BP: (101-110)/(59-64) 110/59     Weight: 87.9 kg (193 lb 12.6 oz)  Body mass index is 29.46 kg/m².     Physical Exam  Constitutional:       General: She is not in acute distress.     Appearance: Normal appearance.   HENT:      Head: Normocephalic and atraumatic.   Eyes:      Extraocular Movements: Extraocular movements intact.      Conjunctiva/sclera: Conjunctivae normal.   Cardiovascular:      Rate and Rhythm: Normal rate and regular rhythm.   Pulmonary:      Effort: Pulmonary effort is normal.   Abdominal:      General: Abdomen is flat. There is no distension.      Palpations: Abdomen is soft. There is no mass.      Tenderness: There is no abdominal tenderness. There is no guarding or rebound.      Hernia: No hernia is present.      Comments: Well healing midline incision, purulent drainage from upper of 2 KEVIN drain sites   Musculoskeletal:         General: No swelling, tenderness, deformity or signs of injury. Normal range of motion.   Skin:     General:  Skin is warm and dry.      Coloration: Skin is not jaundiced.   Neurological:      General: No focal deficit present.      Mental Status: She is alert and oriented to person, place, and time.   Psychiatric:         Mood and Affect: Mood normal.         Behavior: Behavior normal.         Thought Content: Thought content normal.            I have reviewed all pertinent lab results within the past 24 hours.  CBC:   Recent Labs   Lab 06/10/25  1218   WBC 15.96*   RBC 3.96*   HGB 11.8*   HCT 36.4*   *   MCV 92   MCH 29.8   MCHC 32.4     BMP:   Recent Labs   Lab 06/05/25  0254 06/10/25  1218   GLU 74 101   * 135*   K 3.7 3.8    101   CO2 21* 23   BUN 15 36*   CREATININE 0.6 0.8   CALCIUM 8.4* 8.9   MG 2.0  --      CMP:   Recent Labs   Lab 06/10/25  1218      CALCIUM 8.9   ALBUMIN 2.9*   PROT 7.1   *   K 3.8   CO2 23      BUN 36*   CREATININE 0.8   ALKPHOS 110   *   AST 35   BILITOT 0.6       Significant Diagnostics:  I have reviewed all pertinent imaging results/findings within the past 24 hours.  CT: I have reviewed all pertinent results/findings within the past 24 hours and my personal findings are:  Large fluid collection hepatic hilum, no clear tract between that fluid collection and the KEVIN drain site which does have a clear tract coming from it but no large underlying fluid collection.  Unclear if this is fluid tracking down from the hepatic hilum out the KEVIN drain site or if this is just a small leak that is not visualized due to lack of the accumulation.  There does appear to be a small fluid collection a little bit inferior and adjacent to that tract site which I believe may be the source of the fluid coming out of it, but this does not appear to be in communication with anything else.          Assessment/Plan:     Post-operative wound abscess  Status post Whipple almost 2 weeks ago, clinically doing well, however with purulent drainage from previous drain site and mild  leukocytosis up to 15 K. suspect subclinical pancreatic leak versus residual fat necrosis.  Imaging reviewed with Interventional Radiology today.  Plan for IR aspiration and possible drain placement tomorrow into the hilar fluid collection.    --clear liquid diet today, NPO after midnight  -- Zosyn  -- plan for IR tomorrow for aspiration/drain placement  -- local wound care  -- PRN medications for pain control and nausea   -- encourage ambulation and IS  Dispo:  continued med/surg     SCDs for DVT ppx      VTE Risk Mitigation (From admission, onward)           Ordered     IP VTE LOW RISK PATIENT  Once         06/10/25 1204     Place sequential compression device  Until discontinued         06/10/25 1204                    Yolie Lieberman MD  General Surgery  [facility]

## 2025-06-10 NOTE — PLAN OF CARE
[facility]  Initial Discharge Assessment       Primary Care Provider: Yesica Burk MD    Admission Diagnosis: Abscess [L02.91]  Intra-abdominal abscess post-procedure [T81.43XA, K65.1]  Intra-abdominal abscess post-procedure [T81.43XA, K65.1]    Admission Date: 6/10/2025  Expected Discharge Date:     Transition of Care Barriers: None    Payor: BLUE CROSS BLUE SHIELD / Plan: BCBS ALL OUT OF STATE / Product Type: PPO /     Extended Emergency Contact Information  Primary Emergency Contact: Shen KIRK  Address: [address] [place] [address] [id number] [place] [address]  Work Phone: [phone]  Mobile Phone: [phone]  Relation: Spouse  Secondary Emergency Contact: Phillip Thomas  Address: [address]  Home Phone: [phone]  Relation: Son    Discharge Plan A: Home with family, Home Health         [facility] [address] [place] [address] [address] [address]  Phone: [phone] Fax: [phone]    [id number] [place], [place] - [address] [address]  Phone: [phone] Fax: [phone]      Initial Assessment (most recent)       Adult Discharge Assessment - 06/10/25 1327          Discharge Assessment    Assessment Type Discharge Planning Assessment     Confirmed/corrected address, phone number and insurance Yes     Confirmed Demographics Correct on Facesheet     Source of Information health record;patient     Communicated DAVIDA with patient/caregiver Date not available/Unable to determine     People in Home spouse;child(kin), dependent     Name(s) of People in Home Spouse - Shen and Step-son - Tushar     Facility Arrived From: Home     Do you expect to return to your current living situation? Yes     Do you have help at home or someone to help you manage your care at home? Yes     Who are your caregiver(s)  and their phone number(s)? Family     Prior to hospitilization cognitive status: Alert/Oriented     Current cognitive status: Alert/Oriented     Walking or Climbing Stairs Difficulty no     Dressing/Bathing Difficulty no     Equipment Currently Used at Home none     Readmission within 30 days? Yes     Patient currently being followed by outpatient case management? No     Do you currently have service(s) that help you manage your care at home? Yes     Name and Contact number of agency [facility]     Is the pt/caregiver preference to resume services with current agency Yes     Do you take prescription medications? Yes     Do you have prescription coverage? Yes     Do you have any problems affording any of your prescribed medications? TBD     Is the patient taking medications as prescribed? yes     Who is going to help you get home at discharge? Spouse     How do you get to doctors appointments? car, drives self     Are you on dialysis? No     Do you take coumadin? No     Discharge Plan A Home with family;Home Health     DME Needed Upon Discharge  none     Discharge Plan discussed with: Patient     Transition of Care Barriers None                     Readmission Assessment (most recent)       Readmission Assessment - 06/10/25 1329          Readmission    Was this a planned readmission? No     Why were you hospitalized in the last 30 days? Surgery     Why were you readmitted? New medical problem;Related to previous admission     When you left the hospital where did you go? Home with Home Health     Did patient/caregiver refused recommended DC plan? No     Did you try to manage your symptoms your self? Yes     Did you call anyone? Yes     Did you try to see or did see a doctor or nurse before you came? Yes     Did you have  a follow-up appointment on discharge? Yes                    Anticipated DC disposition: home with home health    Prior level of function: independent    PCP: Yescia Burk MD    Comments: Patient  lives at home with spouse and step-son and is independent with ADLs. Spouse will provide transport at the time of d/c. CM confirmed demographics, emergency contacts, PCP and insurance. Needs will depend on hospital progress; CM following for needs.    Patient is current with [facility].

## 2025-06-11 LAB
ABSOLUTE EOSINOPHIL (OHS): 0.14 K/UL
ABSOLUTE MONOCYTE (OHS): 0.95 K/UL (ref 0.3–1)
ABSOLUTE NEUTROPHIL COUNT (OHS): 7.67 K/UL (ref 1.8–7.7)
ALBUMIN SERPL BCP-MCNC: 2.5 G/DL (ref 3.5–5.2)
ALP SERPL-CCNC: 99 UNIT/L (ref 40–150)
ALT SERPL W/O P-5'-P-CCNC: 136 UNIT/L (ref 10–44)
ANION GAP (OHS): 9 MMOL/L (ref 8–16)
AST SERPL-CCNC: 30 UNIT/L (ref 11–45)
BASOPHILS # BLD AUTO: 0.06 K/UL
BASOPHILS NFR BLD AUTO: 0.6 %
BILIRUB SERPL-MCNC: 0.7 MG/DL (ref 0.1–1)
BUN SERPL-MCNC: 30 MG/DL (ref 8–23)
CALCIUM SERPL-MCNC: 8.8 MG/DL (ref 8.7–10.5)
CHLORIDE SERPL-SCNC: 103 MMOL/L (ref 95–110)
CO2 SERPL-SCNC: 24 MMOL/L (ref 23–29)
CREAT SERPL-MCNC: 0.7 MG/DL (ref 0.5–1.4)
ERYTHROCYTE [DISTWIDTH] IN BLOOD BY AUTOMATED COUNT: 13.6 % (ref 11.5–14.5)
ESTROGEN SERPL-MCNC: NORMAL PG/ML
GFR SERPLBLD CREATININE-BSD FMLA CKD-EPI: >60 ML/MIN/1.73/M2
GLUCOSE SERPL-MCNC: 93 MG/DL (ref 70–110)
GRAM STN SPEC: NORMAL
GRAM STN SPEC: NORMAL
HCT VFR BLD AUTO: 33.3 % (ref 37–48.5)
HGB BLD-MCNC: 10.6 GM/DL (ref 12–16)
IMM GRANULOCYTES # BLD AUTO: 0.13 K/UL (ref 0–0.04)
IMM GRANULOCYTES NFR BLD AUTO: 1.3 % (ref 0–0.5)
INSULIN SERPL-ACNC: NORMAL U[IU]/ML
LAB AP CLINICAL INFORMATION: NORMAL
LAB AP GROSS DESCRIPTION: NORMAL
LAB AP INTRA OP: NORMAL
LAB AP PERFORMING LOCATION(S): NORMAL
LAB AP REPORT FOOTNOTES: NORMAL
LYMPHOCYTES # BLD AUTO: 1.35 K/UL (ref 1–4.8)
MCH RBC QN AUTO: 29.5 PG (ref 27–31)
MCHC RBC AUTO-ENTMCNC: 31.8 G/DL (ref 32–36)
MCV RBC AUTO: 93 FL (ref 82–98)
NUCLEATED RBC (/100WBC) (OHS): 0 /100 WBC
PLATELET # BLD AUTO: 498 K/UL (ref 150–450)
PMV BLD AUTO: 8.4 FL (ref 9.2–12.9)
POTASSIUM SERPL-SCNC: 3.9 MMOL/L (ref 3.5–5.1)
PROT SERPL-MCNC: 6.2 GM/DL (ref 6–8.4)
RBC # BLD AUTO: 3.59 M/UL (ref 4–5.4)
RELATIVE EOSINOPHIL (OHS): 1.4 %
RELATIVE LYMPHOCYTE (OHS): 13.1 % (ref 18–48)
RELATIVE MONOCYTE (OHS): 9.2 % (ref 4–15)
RELATIVE NEUTROPHIL (OHS): 74.4 % (ref 38–73)
SODIUM SERPL-SCNC: 136 MMOL/L (ref 136–145)
T3RU NFR SERPL: NORMAL %
WBC # BLD AUTO: 10.3 K/UL (ref 3.9–12.7)

## 2025-06-11 PROCEDURE — 63600175 PHARM REV CODE 636 W HCPCS: Performed by: SURGERY

## 2025-06-11 PROCEDURE — 63600175 PHARM REV CODE 636 W HCPCS: Performed by: RADIOLOGY

## 2025-06-11 PROCEDURE — 25000003 PHARM REV CODE 250: Performed by: SURGERY

## 2025-06-11 PROCEDURE — 80053 COMPREHEN METABOLIC PANEL: CPT | Performed by: SURGERY

## 2025-06-11 PROCEDURE — 11000001 HC ACUTE MED/SURG PRIVATE ROOM

## 2025-06-11 PROCEDURE — 85025 COMPLETE CBC W/AUTO DIFF WBC: CPT | Performed by: SURGERY

## 2025-06-11 PROCEDURE — 82247 BILIRUBIN TOTAL: CPT | Performed by: SURGERY

## 2025-06-11 PROCEDURE — 0W9G3ZZ DRAINAGE OF PERITONEAL CAVITY, PERCUTANEOUS APPROACH: ICD-10-PCS | Performed by: RADIOLOGY

## 2025-06-11 PROCEDURE — 87205 SMEAR GRAM STAIN: CPT | Performed by: SURGERY

## 2025-06-11 PROCEDURE — 87075 CULTR BACTERIA EXCEPT BLOOD: CPT | Performed by: SURGERY

## 2025-06-11 PROCEDURE — 87070 CULTURE OTHR SPECIMN AEROBIC: CPT | Performed by: SURGERY

## 2025-06-11 PROCEDURE — 36415 COLL VENOUS BLD VENIPUNCTURE: CPT | Performed by: SURGERY

## 2025-06-11 PROCEDURE — 82150 ASSAY OF AMYLASE: CPT | Performed by: SURGERY

## 2025-06-11 RX ORDER — FENTANYL CITRATE 50 UG/ML
INJECTION, SOLUTION INTRAMUSCULAR; INTRAVENOUS CODE/TRAUMA/SEDATION MEDICATION
Status: COMPLETED | OUTPATIENT
Start: 2025-06-11 | End: 2025-06-11

## 2025-06-11 RX ORDER — ENOXAPARIN SODIUM 100 MG/ML
40 INJECTION SUBCUTANEOUS EVERY 24 HOURS
Status: DISCONTINUED | OUTPATIENT
Start: 2025-06-11 | End: 2025-06-12 | Stop reason: HOSPADM

## 2025-06-11 RX ORDER — MIDAZOLAM HYDROCHLORIDE 1 MG/ML
INJECTION, SOLUTION INTRAMUSCULAR; INTRAVENOUS CODE/TRAUMA/SEDATION MEDICATION
Status: COMPLETED | OUTPATIENT
Start: 2025-06-11 | End: 2025-06-11

## 2025-06-11 RX ORDER — LIDOCAINE HYDROCHLORIDE 10 MG/ML
INJECTION, SOLUTION INFILTRATION; PERINEURAL CODE/TRAUMA/SEDATION MEDICATION
Status: COMPLETED | OUTPATIENT
Start: 2025-06-11 | End: 2025-06-11

## 2025-06-11 RX ADMIN — SODIUM CHLORIDE: 9 INJECTION, SOLUTION INTRAVENOUS at 09:06

## 2025-06-11 RX ADMIN — OXYCODONE 5 MG: 5 TABLET ORAL at 02:06

## 2025-06-11 RX ADMIN — METOCLOPRAMIDE 10 MG: 5 INJECTION, SOLUTION INTRAMUSCULAR; INTRAVENOUS at 03:06

## 2025-06-11 RX ADMIN — LIDOCAINE HYDROCHLORIDE 5 ML: 10 INJECTION, SOLUTION INFILTRATION; PERINEURAL at 01:06

## 2025-06-11 RX ADMIN — METOCLOPRAMIDE 10 MG: 5 INJECTION, SOLUTION INTRAMUSCULAR; INTRAVENOUS at 05:06

## 2025-06-11 RX ADMIN — FENTANYL CITRATE 50 MCG: 0.05 INJECTION, SOLUTION INTRAMUSCULAR; INTRAVENOUS at 01:06

## 2025-06-11 RX ADMIN — PIPERACILLIN SODIUM AND TAZOBACTAM SODIUM 4.5 G: 4; .5 INJECTION, POWDER, FOR SOLUTION INTRAVENOUS at 05:06

## 2025-06-11 RX ADMIN — Medication 6 MG: at 12:06

## 2025-06-11 RX ADMIN — ENOXAPARIN SODIUM 40 MG: 40 INJECTION SUBCUTANEOUS at 05:06

## 2025-06-11 RX ADMIN — Medication 6 MG: at 08:06

## 2025-06-11 RX ADMIN — PIPERACILLIN SODIUM AND TAZOBACTAM SODIUM 4.5 G: 4; .5 INJECTION, POWDER, FOR SOLUTION INTRAVENOUS at 02:06

## 2025-06-11 RX ADMIN — MIDAZOLAM 1 MG: 1 INJECTION INTRAMUSCULAR; INTRAVENOUS at 01:06

## 2025-06-11 RX ADMIN — ONDANSETRON 8 MG: 8 TABLET, ORALLY DISINTEGRATING ORAL at 07:06

## 2025-06-11 RX ADMIN — PIPERACILLIN SODIUM AND TAZOBACTAM SODIUM 4.5 G: 4; .5 INJECTION, POWDER, FOR SOLUTION INTRAVENOUS at 10:06

## 2025-06-11 RX ADMIN — METOCLOPRAMIDE 10 MG: 5 INJECTION, SOLUTION INTRAMUSCULAR; INTRAVENOUS at 09:06

## 2025-06-11 NOTE — INTERVAL H&P NOTE
The patient has been examined and the H&P has been reviewed:    I concur with the findings and no changes have occurred since H&P was written.        Active Hospital Problems    Diagnosis  POA    Post-operative wound abscess [T81.49XA]  Yes      Resolved Hospital Problems   No resolved problems to display.

## 2025-06-11 NOTE — HOSPITAL COURSE
Hospital day 1 underwent IR drainage with serous fluid noted, WBC normalized, trial p.o. intake    Hospital day 2 small amount of purulent drainage at KEVIN site, tolerating diet, normal WBC, stable and ready for discharge

## 2025-06-11 NOTE — SUBJECTIVE & OBJECTIVE
Interval History: underwent IR drainage with serous fluid noted, WBC normalized, trial p.o. intake    Medications:  Continuous Infusions:   0.9% NaCl   Intravenous Continuous 125 mL/hr at 06/11/25 0958 New Bag at 06/11/25 0958     Scheduled Meds:   metoclopramide  10 mg Intravenous Q8H    piperacillin-tazobactam (Zosyn) IV (PEDS and ADULTS) (extended infusion is not appropriate)  4.5 g Intravenous Q8H     PRN Meds:  Current Facility-Administered Medications:     acetaminophen, 650 mg, Oral, Q8H PRN    acetaminophen, 650 mg, Oral, Q4H PRN    LIDOcaine (PF) 10 mg/ml (1%), 1 mL, Intradermal, Once PRN    melatonin, 6 mg, Oral, Nightly PRN    ondansetron, 8 mg, Oral, Q8H PRN    oxyCODONE, 10 mg, Oral, Q4H PRN    oxyCODONE, 5 mg, Oral, Q4H PRN    sodium chloride 0.9%, 10 mL, Intravenous, PRN     Review of patient's allergies indicates:   Allergen Reactions    Dilaudid [hydromorphone] Itching     From IV    Nitrofurantoin Other (See Comments)     Cramping of legs     Objective:     Vital Signs (Most Recent):  Temp: 97.9 °F (36.6 °C) (06/11/25 1627)  Pulse: 86 (06/11/25 1627)  Resp: 18 (06/11/25 1627)  BP: (!) 109/59 (06/11/25 1627)  SpO2: 96 % (06/11/25 1627) Vital Signs (24h Range):  Temp:  [97.7 °F (36.5 °C)-98.7 °F (37.1 °C)] 97.9 °F (36.6 °C)  Pulse:  [85-94] 86  Resp:  [16-18] 18  SpO2:  [92 %-100 %] 96 %  BP: ()/(53-67) 109/59     Weight: 87.9 kg (193 lb 12.6 oz)  Body mass index is 29.46 kg/m².    Intake/Output - Last 3 Shifts         06/09 0700  06/10 0659 06/10 0700  06/11 0659 06/11 0700  06/12 0659    P.O.  120     Total Intake(mL/kg)  120 (1.4)     Net  +120            Unmeasured Stool Occurrence   1 x             Physical Exam  Vitals reviewed.   Constitutional:       Appearance: She is well-developed. She is obese.   HENT:      Head: Normocephalic and atraumatic.   Cardiovascular:      Rate and Rhythm: Normal rate.   Pulmonary:      Effort: Pulmonary effort is normal.   Abdominal:      General: There is  no distension.      Palpations: Abdomen is soft.      Tenderness: There is no abdominal tenderness.      Comments: Well-healed surgical scar  Right-sided drainage purulent   Skin:     General: Skin is warm and dry.   Neurological:      Mental Status: She is alert and oriented to person, place, and time.          Significant Labs:  I have reviewed all pertinent lab results within the past 24 hours.  CBC:   Recent Labs   Lab 06/11/25  0444   WBC 10.30   RBC 3.59*   HGB 10.6*   HCT 33.3*   *   MCV 93   MCH 29.5   MCHC 31.8*     CMP:   Recent Labs   Lab 06/11/25  0444   GLU 93   CALCIUM 8.8   ALBUMIN 2.5*   PROT 6.2      K 3.9   CO2 24      BUN 30*   CREATININE 0.7   ALKPHOS 99   *   AST 30   BILITOT 0.7       Significant Diagnostics:  CT:   FINDINGS:  Procedure: The risks and benefits of this procedure were discussed with the patient, written informed consent was obtained.  The patient was placed supine on the CT gantry.  Preprocedural imaging revealed collection within the epigastric region between left hepatic lobe and body of the stomach.  Collection within the right upper quadrant seen on prior exam not well seen on today's exam.  A suitable skin site for biopsy was selected.  IV fentanyl and Versed was given for conscious sedation.  The skin site was prepped and draped in sterile fashion.  The skin was anesthetized with 1% lidocaine.     Using CT guidance a 21-gauge introducer needle was guided into the collection.  Prior to aspiration appropriate needle positioning was confirmed with CT.   Aspiration yielded approximately 10 cc of serous material which was sent for as directed by the primary service.  The stylet was replaced and the needle was removed.     Postprocedural imaging was acquired. The site was bandaged sterilely. The patient left the room in stable condition.     Impression:     CT guided aspiration of epigastric collection yielding 10 cc of serous material which was sent for  cultures, lipase and bilirubin.

## 2025-06-11 NOTE — PROGRESS NOTES
[facility]  General Surgery  Progress Note    Subjective:     History of Present Illness:  Chel Thompson is a 64 y.o. female s/p whipple on 5/29/2025 for MD-IPMN, for purulent drainage from previous drain site.  Postoperatively she initially did very well.  She had a elevated drain amylase but that decreased down to normal, CT scan done on day of discharge showed no undrained fluid collections, and the drain output was serosanguineous.  Her KEVIN drains were removed prior to discharge and she was discharged home on postoperative day 7.  She has been doing very well at home and then last night noticed a change in the character and quantity of output from the previous KEVIN drain site.  She states that last night it changed from a minimal serous color to larger amounts of purulent exudate.  She was seen by her home health nurse today who sent him picture to our office and from there she was direct admitted to the hospital.  Upon admission she was afebrile, vitals were within normal limits.  Labs did show a mild leukocytosis up to 15 K, otherwise they are unremarkable.  CT scan was done with IV contrast which showed interval development of a fluid collection in the hepatic hilum extending into the retroperitoneum concerning for biloma versus abscess versus seroma.  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  She denies any fevers, chills, nausea, vomiting, or changes in bowel or bladder.  She states she has been eating well and overall still feels very well.    Post-Op Info:  * No surgery found *         Interval History: underwent IR drainage with serous fluid noted, WBC normalized, trial p.o. intake    Medications:  Continuous Infusions:   0.9% NaCl   Intravenous Continuous 125 mL/hr at 06/11/25 0958 New Bag at 06/11/25 0958     Scheduled Meds:   metoclopramide  10 mg Intravenous Q8H    piperacillin-tazobactam (Zosyn) IV (PEDS and ADULTS) (extended infusion is not  appropriate)  4.5 g Intravenous Q8H     PRN Meds:  Current Facility-Administered Medications:     acetaminophen, 650 mg, Oral, Q8H PRN    acetaminophen, 650 mg, Oral, Q4H PRN    LIDOcaine (PF) 10 mg/ml (1%), 1 mL, Intradermal, Once PRN    melatonin, 6 mg, Oral, Nightly PRN    ondansetron, 8 mg, Oral, Q8H PRN    oxyCODONE, 10 mg, Oral, Q4H PRN    oxyCODONE, 5 mg, Oral, Q4H PRN    sodium chloride 0.9%, 10 mL, Intravenous, PRN     Review of patient's allergies indicates:   Allergen Reactions    Dilaudid [hydromorphone] Itching     From IV    Nitrofurantoin Other (See Comments)     Cramping of legs     Objective:     Vital Signs (Most Recent):  Temp: 97.9 °F (36.6 °C) (06/11/25 1627)  Pulse: 86 (06/11/25 1627)  Resp: 18 (06/11/25 1627)  BP: (!) 109/59 (06/11/25 1627)  SpO2: 96 % (06/11/25 1627) Vital Signs (24h Range):  Temp:  [97.7 °F (36.5 °C)-98.7 °F (37.1 °C)] 97.9 °F (36.6 °C)  Pulse:  [85-94] 86  Resp:  [16-18] 18  SpO2:  [92 %-100 %] 96 %  BP: ()/(53-67) 109/59     Weight: 87.9 kg (193 lb 12.6 oz)  Body mass index is 29.46 kg/m².    Intake/Output - Last 3 Shifts         06/09 0700  06/10 0659 06/10 0700  06/11 0659 06/11 0700  06/12 0659    P.O.  120     Total Intake(mL/kg)  120 (1.4)     Net  +120            Unmeasured Stool Occurrence   1 x             Physical Exam  Vitals reviewed.   Constitutional:       Appearance: She is well-developed. She is obese.   HENT:      Head: Normocephalic and atraumatic.   Cardiovascular:      Rate and Rhythm: Normal rate.   Pulmonary:      Effort: Pulmonary effort is normal.   Abdominal:      General: There is no distension.      Palpations: Abdomen is soft.      Tenderness: There is no abdominal tenderness.      Comments: Well-healed surgical scar  Right-sided drainage purulent   Skin:     General: Skin is warm and dry.   Neurological:      Mental Status: She is alert and oriented to person, place, and time.          Significant Labs:  I have reviewed all pertinent lab  results within the past 24 hours.  CBC:   Recent Labs   Lab 06/11/25  0444   WBC 10.30   RBC 3.59*   HGB 10.6*   HCT 33.3*   *   MCV 93   MCH 29.5   MCHC 31.8*     CMP:   Recent Labs   Lab 06/11/25  0444   GLU 93   CALCIUM 8.8   ALBUMIN 2.5*   PROT 6.2      K 3.9   CO2 24      BUN 30*   CREATININE 0.7   ALKPHOS 99   *   AST 30   BILITOT 0.7       Significant Diagnostics:  CT:   FINDINGS:  Procedure: The risks and benefits of this procedure were discussed with the patient, written informed consent was obtained.  The patient was placed supine on the CT gantry.  Preprocedural imaging revealed collection within the epigastric region between left hepatic lobe and body of the stomach.  Collection within the right upper quadrant seen on prior exam not well seen on today's exam.  A suitable skin site for biopsy was selected.  IV fentanyl and Versed was given for conscious sedation.  The skin site was prepped and draped in sterile fashion.  The skin was anesthetized with 1% lidocaine.     Using CT guidance a 21-gauge introducer needle was guided into the collection.  Prior to aspiration appropriate needle positioning was confirmed with CT.   Aspiration yielded approximately 10 cc of serous material which was sent for as directed by the primary service.  The stylet was replaced and the needle was removed.     Postprocedural imaging was acquired. The site was bandaged sterilely. The patient left the room in stable condition.     Impression:     CT guided aspiration of epigastric collection yielding 10 cc of serous material which was sent for cultures, lipase and bilirubin.  Assessment/Plan:     Post-operative wound abscess  Status post Whipple almost 2 weeks ago, clinically doing well, however with purulent drainage from previous drain site and mild leukocytosis up to 15 K. suspect subclinical pancreatic leak versus residual fat necrosis.  Imaging reviewed with Interventional Radiology today.  Plan  for IR aspiration and possible drain placement tomorrow into the hilar fluid collection.    --serous fluid obtained from IR aspiration  --clear liquid diet advance as tolerated  -- WBC normalizing continue Zosyn  -- labs in a.m.  -- local wound care  -- PRN medications for pain control and nausea   -- encourage ambulation and IS  Dispo:  continued med/surg     SCDs for DVT ppx        Chin Diaz MD  General Surgery  [facility]

## 2025-06-11 NOTE — OP NOTE
Pre Op Diagnosis: abdominal fluid collection     Post Op Diagnosis: same     Procedure:  aspiration     Procedure performed by: Cristy MITCHELL, Juarez BROWN     Written Informed Consent Obtained: Yes     Specimen Removed:  yes     Estimated Blood Loss:  minimal     Findings: Local anesthesia     Sedation:  yes     The patient tolerated the procedure well and there were no complications.      Disposition:  F/U in clinic or with ordering physician    Discharge instructions:  Light activity for 24 hours.  Remove band aid in 24 hours.  No baths (showers are appropriate).      Sterile technique was performed in the mid upper abdomen, lidocaine was used as a local anesthetic.  Approx 10 ccs of serosanguinous fluid removed and sent to lab.  Pt tolerated the procedure well without immediate complications.  Please see radiologist report for details. F/u with PCP and/or ordering physician.

## 2025-06-11 NOTE — ASSESSMENT & PLAN NOTE
Status post Whipple almost 2 weeks ago, clinically doing well, however with purulent drainage from previous drain site and mild leukocytosis up to 15 K. suspect subclinical pancreatic leak versus residual fat necrosis.  Imaging reviewed with Interventional Radiology today.  Plan for IR aspiration and possible drain placement tomorrow into the hilar fluid collection.    --serous fluid obtained from IR aspiration  --clear liquid diet advance as tolerated  -- WBC normalizing continue Zosyn  -- labs in a.m.  -- local wound care  -- PRN medications for pain control and nausea   -- encourage ambulation and IS  Dispo:  continued med/surg     SCDs for DVT ppx

## 2025-06-11 NOTE — PLAN OF CARE
Discussed poc with pt, pt verbalized understanding    Purposeful rounding every 2hours    VS wnl  Fall precautions in place, remains injury free  Pt denies c/o pain and nausea at this time  Pain and nausea under control with PRN meds    IVFs  Accurate I&Os  Abx given as prescribed  Bed locked at lowest position  Call light within reach    Chart check complete  Will cont with POC

## 2025-06-12 ENCOUNTER — PATIENT MESSAGE (OUTPATIENT)
Dept: SURGICAL ONCOLOGY | Facility: CLINIC | Age: 65
End: 2025-06-12
Payer: COMMERCIAL

## 2025-06-12 VITALS
OXYGEN SATURATION: 95 % | DIASTOLIC BLOOD PRESSURE: 82 MMHG | HEIGHT: 68 IN | TEMPERATURE: 99 F | HEART RATE: 88 BPM | RESPIRATION RATE: 15 BRPM | SYSTOLIC BLOOD PRESSURE: 134 MMHG | BODY MASS INDEX: 29.37 KG/M2 | WEIGHT: 193.81 LBS

## 2025-06-12 LAB
ABSOLUTE EOSINOPHIL (OHS): 0.09 K/UL
ABSOLUTE MONOCYTE (OHS): 0.75 K/UL (ref 0.3–1)
ABSOLUTE NEUTROPHIL COUNT (OHS): 6.14 K/UL (ref 1.8–7.7)
ALBUMIN SERPL BCP-MCNC: 2.6 G/DL (ref 3.5–5.2)
ALP SERPL-CCNC: 98 UNIT/L (ref 40–150)
ALT SERPL W/O P-5'-P-CCNC: 119 UNIT/L (ref 10–44)
ANION GAP (OHS): 12 MMOL/L (ref 8–16)
AST SERPL-CCNC: 27 UNIT/L (ref 11–45)
BASOPHILS # BLD AUTO: 0.06 K/UL
BASOPHILS NFR BLD AUTO: 0.7 %
BILIRUB SERPL-MCNC: 0.5 MG/DL (ref 0.1–1)
BUN SERPL-MCNC: 20 MG/DL (ref 8–23)
CALCIUM SERPL-MCNC: 8.4 MG/DL (ref 8.7–10.5)
CHLORIDE SERPL-SCNC: 103 MMOL/L (ref 95–110)
CO2 SERPL-SCNC: 22 MMOL/L (ref 23–29)
CREAT SERPL-MCNC: 0.6 MG/DL (ref 0.5–1.4)
ERYTHROCYTE [DISTWIDTH] IN BLOOD BY AUTOMATED COUNT: 13.2 % (ref 11.5–14.5)
GFR SERPLBLD CREATININE-BSD FMLA CKD-EPI: >60 ML/MIN/1.73/M2
GLUCOSE SERPL-MCNC: 83 MG/DL (ref 70–110)
HCT VFR BLD AUTO: 33.9 % (ref 37–48.5)
HGB BLD-MCNC: 10.8 GM/DL (ref 12–16)
IMM GRANULOCYTES # BLD AUTO: 0.06 K/UL (ref 0–0.04)
IMM GRANULOCYTES NFR BLD AUTO: 0.7 % (ref 0–0.5)
LYMPHOCYTES # BLD AUTO: 1.44 K/UL (ref 1–4.8)
MCH RBC QN AUTO: 29.4 PG (ref 27–31)
MCHC RBC AUTO-ENTMCNC: 31.9 G/DL (ref 32–36)
MCV RBC AUTO: 92 FL (ref 82–98)
NUCLEATED RBC (/100WBC) (OHS): 0 /100 WBC
PLATELET # BLD AUTO: 545 K/UL (ref 150–450)
PMV BLD AUTO: 8.5 FL (ref 9.2–12.9)
POTASSIUM SERPL-SCNC: 3.7 MMOL/L (ref 3.5–5.1)
PROT SERPL-MCNC: 6.3 GM/DL (ref 6–8.4)
RBC # BLD AUTO: 3.67 M/UL (ref 4–5.4)
RELATIVE EOSINOPHIL (OHS): 1.1 %
RELATIVE LYMPHOCYTE (OHS): 16.9 % (ref 18–48)
RELATIVE MONOCYTE (OHS): 8.8 % (ref 4–15)
RELATIVE NEUTROPHIL (OHS): 71.8 % (ref 38–73)
SODIUM SERPL-SCNC: 137 MMOL/L (ref 136–145)
WBC # BLD AUTO: 8.54 K/UL (ref 3.9–12.7)

## 2025-06-12 PROCEDURE — 36415 COLL VENOUS BLD VENIPUNCTURE: CPT | Performed by: SURGERY

## 2025-06-12 PROCEDURE — 80053 COMPREHEN METABOLIC PANEL: CPT | Performed by: SURGERY

## 2025-06-12 PROCEDURE — 63600175 PHARM REV CODE 636 W HCPCS: Performed by: SURGERY

## 2025-06-12 PROCEDURE — 85025 COMPLETE CBC W/AUTO DIFF WBC: CPT | Performed by: SURGERY

## 2025-06-12 PROCEDURE — 25000003 PHARM REV CODE 250: Performed by: SURGERY

## 2025-06-12 RX ORDER — METOCLOPRAMIDE 5 MG/1
5 TABLET ORAL
Status: DISCONTINUED | OUTPATIENT
Start: 2025-06-12 | End: 2025-06-12 | Stop reason: HOSPADM

## 2025-06-12 RX ORDER — AMOXICILLIN AND CLAVULANATE POTASSIUM 875; 125 MG/1; MG/1
1 TABLET, FILM COATED ORAL 2 TIMES DAILY
Qty: 14 TABLET | Refills: 0 | Status: SHIPPED | OUTPATIENT
Start: 2025-06-12 | End: 2025-06-19

## 2025-06-12 RX ORDER — PROMETHAZINE HYDROCHLORIDE 25 MG/1
25 TABLET ORAL EVERY 6 HOURS PRN
Status: DISCONTINUED | OUTPATIENT
Start: 2025-06-12 | End: 2025-06-12 | Stop reason: HOSPADM

## 2025-06-12 RX ORDER — VALSARTAN 160 MG/1
160 TABLET ORAL EVERY MORNING
Status: DISCONTINUED | OUTPATIENT
Start: 2025-06-12 | End: 2025-06-12 | Stop reason: HOSPADM

## 2025-06-12 RX ORDER — VALSARTAN AND HYDROCHLOROTHIAZIDE 160; 12.5 MG/1; MG/1
1 TABLET, FILM COATED ORAL EVERY MORNING
Status: DISCONTINUED | OUTPATIENT
Start: 2025-06-12 | End: 2025-06-12 | Stop reason: CLARIF

## 2025-06-12 RX ORDER — HYDROCHLOROTHIAZIDE 12.5 MG/1
12.5 TABLET ORAL EVERY MORNING
Status: DISCONTINUED | OUTPATIENT
Start: 2025-06-12 | End: 2025-06-12 | Stop reason: HOSPADM

## 2025-06-12 RX ORDER — TRAZODONE HYDROCHLORIDE 50 MG/1
50 TABLET ORAL NIGHTLY
Status: DISCONTINUED | OUTPATIENT
Start: 2025-06-12 | End: 2025-06-12 | Stop reason: HOSPADM

## 2025-06-12 RX ADMIN — METOCLOPRAMIDE 10 MG: 5 INJECTION, SOLUTION INTRAMUSCULAR; INTRAVENOUS at 06:06

## 2025-06-12 RX ADMIN — VALSARTAN 160 MG: 160 TABLET, FILM COATED ORAL at 08:06

## 2025-06-12 RX ADMIN — HYDROCHLOROTHIAZIDE 12.5 MG: 12.5 TABLET ORAL at 08:06

## 2025-06-12 RX ADMIN — SODIUM CHLORIDE: 9 INJECTION, SOLUTION INTRAVENOUS at 04:06

## 2025-06-12 RX ADMIN — PROMETHAZINE HYDROCHLORIDE 25 MG: 25 TABLET ORAL at 03:06

## 2025-06-12 RX ADMIN — PIPERACILLIN SODIUM AND TAZOBACTAM SODIUM 4.5 G: 4; .5 INJECTION, POWDER, FOR SOLUTION INTRAVENOUS at 06:06

## 2025-06-12 RX ADMIN — METOCLOPRAMIDE 5 MG: 5 TABLET ORAL at 11:06

## 2025-06-12 NOTE — DISCHARGE SUMMARY
[facility]  General Surgery  Discharge Summary      Patient Name: Chel Thompson  MRN: [id number]  Admission Date: 6/10/2025  Hospital Length of Stay: 2 days  Discharge Date and Time: 06/12/2025 8:03 AM  Attending Physician: Yolie Lieberman MD   Discharging Provider: Chin Diaz MD  Primary Care Provider: Yesica Burk MD    HPI:   Chel Thompson is a 64 y.o. female s/p whipple on 5/29/2025 for MD-IPMN, for purulent drainage from previous drain site.  Postoperatively she initially did very well.  She had a elevated drain amylase but that decreased down to normal, CT scan done on day of discharge showed no undrained fluid collections, and the drain output was serosanguineous.  Her KEVIN drains were removed prior to discharge and she was discharged home on postoperative day 7.  She has been doing very well at home and then last night noticed a change in the character and quantity of output from the previous KEVIN drain site.  She states that last night it changed from a minimal serous color to larger amounts of purulent exudate.  She was seen by her home health nurse today who sent him picture to our office and from there she was direct admitted to the hospital.  Upon admission she was afebrile, vitals were within normal limits.  Labs did show a mild leukocytosis up to 15 K, otherwise they are unremarkable.  CT scan was done with IV contrast which showed interval development of a fluid collection in the hepatic hilum extending into the retroperitoneum concerning for biloma versus abscess versus seroma.  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  She denies any fevers, chills, nausea, vomiting, or changes in bowel or bladder.  She states she has been eating well and overall still feels very well.    * No surgery found *      Indwelling Lines/Drains at time of discharge:   Lines/Drains/Airways       None                 Hospital Course: Hospital day 1 underwent  IR drainage with serous fluid noted, WBC normalized, trial p.o. intake    Hospital day 2 small amount of purulent drainage at KEVIN site, tolerating diet, normal WBC, stable and ready for discharge    Goals of Care Treatment Preferences:  Code Status: Full Code      Consults: none    Significant Diagnostic Studies: N/A    Pending Diagnostic Studies:       Procedure Component Value Units Date/Time    Amylase, Pancreatic Cyst Fluid [[id number]] Collected: 06/11/25 1343    Order Status: Sent Lab Status: In process Updated: 06/11/25 1353    Specimen: Body Fluid from Pancreatic Fluid     Bilirubin, Body Fluid (Reference Lab) [facility]; Abdominal Fluid (Abscess so Whipple Dx: Intraductal papillary mucinous neoplasm of pancreas [D49.0 (ICD-10-CM)]) [[id number]] Collected: 06/11/25 1349    Order Status: Sent Lab Status: In process Updated: 06/11/25 1353    Specimen: Body Fluid           Final Active Diagnoses:    Diagnosis Date Noted POA    PRINCIPAL PROBLEM:  Post-operative wound abscess [T81.49XA] 06/10/2025 Yes      Problems Resolved During this Admission:      Discharged Condition: good    Disposition: Home or Self Care    Follow Up:   Follow-up Information       Yolie Lieberman MD Follow up in 1 week(s).    Specialty: Surgical Oncology  Contact information:  [address] [address]  [facility] [address] [phone]                           Patient Instructions:   No discharge procedures on file.  Medications:  Reconciled Home Medications:      Medication List        START taking these medications      amoxicillin-clavulanate 875-125mg 875-125 mg per tablet  Commonly known as: AUGMENTIN  Take 1 tablet by mouth 2 (two) times daily for 7 days            CONTINUE taking these medications      cyclobenzaprine 5 MG tablet  Commonly known as: FLEXERIL  Take 1 tablet (5 mg total) by mouth 3 (three) times daily as needed for Muscle spasms.     metoclopramide HCl 5 MG tablet  Commonly known as: REGLAN  Take 1 tablet (5 mg total)  by mouth 3 (three) times daily before meals.     oxyCODONE 5 MG immediate release tablet  Commonly known as: ROXICODONE  Take 1 tablet (5 mg total) by mouth every 4 (four) hours as needed for Pain.     traZODone 50 MG tablet  Commonly known as: DESYREL  Take 1 tablet (50 mg total) by mouth every evening.     valsartan-hydrochlorothiazide 160-12.5 mg per tablet  Commonly known as: DIOVAN-HCT  Take 1 tablet by mouth every morning.            Time spent on the discharge of patient: 30 minutes    Chin Diaz MD  General Surgery  [facility]

## 2025-06-12 NOTE — PLAN OF CARE
[facility] Med [facility]  Discharge Final Note    Primary Care Provider: Yesica Burk MD    Expected Discharge Date: 6/12/2025    Final Discharge Note (most recent)       Final Note - 06/12/25 0843          Final Note    Assessment Type Final Discharge Note     Antic[place] [facility] [place] Resources/Appts/Education Provided Appointments scheduled and added to AVS;Provided patient/caregiver with written discharge plan information;Post-Acute resouces added to AVS        Post-Acute Status    Discharge Delays None known at this time                     Important Message from Medicare             Contact Info       Yolie Lieberman MD   Specialty: Surgical Oncology    [address] [address].  [place] [address]   Phone: [phone]       Next Steps: Follow up in 1 week(s)          DC Dispo: home with home health    PCP: no appt suggestion available    DME: none    [facility] resumed.    CM reviewed chart; no d/c needs noted.

## 2025-06-13 ENCOUNTER — TELEPHONE (OUTPATIENT)
Dept: SURGICAL ONCOLOGY | Facility: CLINIC | Age: 65
End: 2025-06-13
Payer: COMMERCIAL

## 2025-06-13 ENCOUNTER — PATIENT MESSAGE (OUTPATIENT)
Dept: SURGICAL ONCOLOGY | Facility: CLINIC | Age: 65
End: 2025-06-13
Payer: COMMERCIAL

## 2025-06-13 LAB
AMYLASE P FLD-CCNC: 8 U/L
BDY SITE: NORMAL
BILIRUB FLD-MCNC: 0.5 MG/DL
BODY FLD TYPE: NORMAL

## 2025-06-13 NOTE — TELEPHONE ENCOUNTER
"Received pt's message:    "Alena Conti MA Intres, Amy, MA  Ps I lost [employer] office phone number and couldn't call.  Sorry!!  Chel Nguyen Martha Jay to JILLIAN Urbano Staff (supporting Yolie Lieberman MD)        6/12/25  1:22 PM  Paulie bueno!! I just got off the phone with [employer] term and was told they are missing paperwork that is due by June 15th!! Or my leave will be denied.  They said they need the second page of attending providers statement along with Entire Certification of the Healthcare Provider.  That is what she told me.  Can you please get this facilitated asap?  Thank you. I am home now discharged at noon.  Call me if you need anything.  Thanks Again!!"    Called [employer] and spoke with Shannan @ [phone] to let them know provider and nurse are out until after the 15th. I explained this is not the pt's fault and asked for an extension for the pt and a call back letting me know this was received and is being addressed. Also explained pt was released yesterday and that is when paperwork starts. Gave contact information as well as good contact telephone number. Shannan verbalized understanding and stated she will relay this message.    Called pt to update pt on call made to [employer]/Shannan. Pt is going to call them from her end to make sure they received communication and it is addressed. Pt and Shannan verbalized understanding of all things discussed.  "

## 2025-06-13 NOTE — TELEPHONE ENCOUNTER
Spoke with Marilin @ [facility] [phone]. She documented all information from mv pt and me as we were on a 3 way call. She forwarded call to another  as the pt's CW is not in today. We spoke with Fede. She gave the pt 7 day extension to have paperwork completely turned in. Fax  is missing a couple of papers. Pt's son will bring all documents by this afternoon to ENSURE we have them all scanned. Pt's extension ends on 06/21/2025. Pt verbalized understanding of all things discussed. Pt is feeling well after surgery and has no medical complaints at this time.

## 2025-06-13 NOTE — TELEPHONE ENCOUNTER
Pt called back to let me know medical leave company will not give an extension on the date that the paperwork should be delivered to them. Paperwork has been sent and received but there are 2 pages missing in the fax.  Pt spoke with Laney/ who told her she cannot get an extension and will have to file a request after it expires for an extension.    Called and spoke with Tarik in HR. He said to have  the pt call HR and ask what the protocol is for this situation. He stated they have different avenues for this but he is unable to discuss them with a provider's office. Called and lvm with this information for the pt to call back so we can make a 3 way call to [facility]. Gave all information and good call back number  .

## 2025-06-14 LAB — BACTERIA SPEC AEROBE CULT: NO GROWTH

## 2025-06-15 LAB
BACTERIA BLD CULT: NORMAL
BACTERIA BLD CULT: NORMAL

## 2025-06-17 ENCOUNTER — OFFICE VISIT (OUTPATIENT)
Dept: FAMILY MEDICINE | Facility: CLINIC | Age: 65
End: 2025-06-17
Payer: COMMERCIAL

## 2025-06-17 ENCOUNTER — TELEPHONE (OUTPATIENT)
Dept: SURGICAL ONCOLOGY | Facility: CLINIC | Age: 65
End: 2025-06-17
Payer: COMMERCIAL

## 2025-06-17 VITALS
SYSTOLIC BLOOD PRESSURE: 134 MMHG | WEIGHT: 185.75 LBS | TEMPERATURE: 97 F | HEART RATE: 72 BPM | BODY MASS INDEX: 28.15 KG/M2 | OXYGEN SATURATION: 97 % | HEIGHT: 68 IN | DIASTOLIC BLOOD PRESSURE: 78 MMHG

## 2025-06-17 DIAGNOSIS — G47.00 INSOMNIA, UNSPECIFIED TYPE: ICD-10-CM

## 2025-06-17 DIAGNOSIS — Z90.49 H/O WHIPPLE PROCEDURE: ICD-10-CM

## 2025-06-17 DIAGNOSIS — Z09 HOSPITAL DISCHARGE FOLLOW-UP: ICD-10-CM

## 2025-06-17 DIAGNOSIS — D64.9 ANEMIA, UNSPECIFIED TYPE: ICD-10-CM

## 2025-06-17 DIAGNOSIS — Z90.410 H/O WHIPPLE PROCEDURE: ICD-10-CM

## 2025-06-17 DIAGNOSIS — D49.0 INTRADUCTAL PAPILLARY MUCINOUS NEOPLASM OF PANCREAS: Primary | ICD-10-CM

## 2025-06-17 DIAGNOSIS — L98.9 SKIN LESION: ICD-10-CM

## 2025-06-17 PROCEDURE — 99999 PR PBB SHADOW E&M-EST. PATIENT-LVL IV: CPT | Mod: PBBFAC,,, | Performed by: STUDENT IN AN ORGANIZED HEALTH CARE EDUCATION/TRAINING PROGRAM

## 2025-06-17 PROCEDURE — 99214 OFFICE O/P EST MOD 30 MIN: CPT | Mod: S$GLB,,, | Performed by: STUDENT IN AN ORGANIZED HEALTH CARE EDUCATION/TRAINING PROGRAM

## 2025-06-17 PROCEDURE — 3078F DIAST BP <80 MM HG: CPT | Mod: CPTII,S$GLB,, | Performed by: STUDENT IN AN ORGANIZED HEALTH CARE EDUCATION/TRAINING PROGRAM

## 2025-06-17 PROCEDURE — 1159F MED LIST DOCD IN RCRD: CPT | Mod: CPTII,S$GLB,, | Performed by: STUDENT IN AN ORGANIZED HEALTH CARE EDUCATION/TRAINING PROGRAM

## 2025-06-17 PROCEDURE — 3075F SYST BP GE 130 - 139MM HG: CPT | Mod: CPTII,S$GLB,, | Performed by: STUDENT IN AN ORGANIZED HEALTH CARE EDUCATION/TRAINING PROGRAM

## 2025-06-17 PROCEDURE — 1111F DSCHRG MED/CURRENT MED MERGE: CPT | Mod: CPTII,S$GLB,, | Performed by: STUDENT IN AN ORGANIZED HEALTH CARE EDUCATION/TRAINING PROGRAM

## 2025-06-17 RX ORDER — ZOLPIDEM TARTRATE 5 MG/1
5 TABLET ORAL NIGHTLY PRN
Qty: 10 TABLET | Refills: 0 | Status: SHIPPED | OUTPATIENT
Start: 2025-06-17 | End: 2025-12-16

## 2025-06-17 NOTE — TELEPHONE ENCOUNTER
Called pt to remind her to sign her medical leave forms so I can fax it before it is due on the 21st. Lvm asking pt to call back. Gave good call back number

## 2025-06-17 NOTE — PROGRESS NOTES
Patient ID: Chel Thompson is a 64 y.o. female.    Chief Complaint: Hospital Follow Up    History of Present Illness    CHIEF COMPLAINT:  Ms. Thompson presents today for follow up after Whipple procedure    RECENT SURGERY:  She underwent Whipple procedure on the 29th with no evidence of dysplasia found, involving removal of portions of pancreas, gallbladder, small intestines, and stomach. She developed post-surgical infection at one of the drainage sites, requiring hospitalization from the 10th to 12th. She currently requires daily wound dressing changes performed by her . She reports having regular bowel movements.    SLEEP:  She reports significant sleep disturbance since hospital discharge, only sleeping 2-3 hours per night and feeling exhausted. She has difficulty getting comfortable in bed and cannot sleep on her side as previously accustomed. Trazodone was ineffective, though melatonin provided in the hospital offered some relief. She denies caffeine consumption.    HOME HEALTH:  She receives home health visits, with most recent visit yesterday, including monitoring of vital signs and wound care. She has support at home from her  and stepson.    DERMATOLOGIC:  She reports a longstanding mole on her back and concerns about toenail fungus affecting one toenail.    [facility] - Med Surg  General Surgery  Discharge Summary        Patient Name: Chel Thompson  MRN: [id number]  Admission Date: 6/10/2025  Hospital Length of Stay: 2 days  Discharge Date and Time: 06/12/2025 8:03 AM  Attending Physician: Yolie Lieberman MD   Discharging Provider: Chin Diaz MD  Primary Care Provider: Yesica Burk MD     HPI:   Chel Thompson is a 64 y.o. female s/p whipple on 5/29/2025 for MD-IPMN, for purulent drainage from previous drain site.  Postoperatively she initially did very well.  She had a elevated drain amylase but that decreased down to normal, CT scan done on day of  discharge showed no undrained fluid collections, and the drain output was serosanguineous.  Her KEVIN drains were removed prior to discharge and she was discharged home on postoperative day 7.  She has been doing very well at home and then last night noticed a change in the character and quantity of output from the previous KEVIN drain site.  She states that last night it changed from a minimal serous color to larger amounts of purulent exudate.  She was seen by her home health nurse today who sent him picture to our office and from there she was direct admitted to the hospital.  Upon admission she was afebrile, vitals were within normal limits.  Labs did show a mild leukocytosis up to 15 K, otherwise they are unremarkable.  CT scan was done with IV contrast which showed interval development of a fluid collection in the hepatic hilum extending into the retroperitoneum concerning for biloma versus abscess versus seroma.  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  She denies any fevers, chills, nausea, vomiting, or changes in bowel or bladder.  She states she has been eating well and overall still feels very well.     * No surgery found *      Indwelling Lines/Drains at time of discharge:   Lines/Drains/Airways         None                      Hospital Course: Hospital day 1 underwent IR drainage with serous fluid noted, WBC normalized, trial p.o. intake     Hospital day 2 small amount of purulent drainage at KEVIN site, tolerating diet, normal WBC, stable and ready for discharge     ROS:  General: -fever, -chills, +fatigue, -weight gain, -weight loss  Eyes: -vision changes, -redness, -discharge  ENT: -ear pain, -nasal congestion, -sore throat  Cardiovascular: -chest pain, -palpitations, -lower extremity edema  Respiratory: -cough, -shortness of breath  Gastrointestinal: -abdominal pain, -nausea, -vomiting, -diarrhea, -constipation, -blood in stool, +abdominal distention  Genitourinary:  -dysuria, -hematuria, -frequency  Musculoskeletal: -joint pain, -muscle pain, +pain with movement  Skin: -rash, +lesion, +abnormal moles, +toenail thickening, +nail yellowing  Neurological: -headache, -dizziness, -numbness, -tingling, +difficulty staying asleep, +difficulty falling asleep  Psychiatric: -anxiety, -depression, +sleep difficulty         Pmh, Psh, Family Hx, Social Hx updated in Epic Tabs today.       2/11/2025     8:13 AM 8/6/2024     7:28 AM 5/30/2023    10:27 AM 2/8/2022     4:55 PM   Depression Patient Health Questionnaire   Over the last two weeks how often have you been bothered by little interest or pleasure in doing things Not at all Not at all Not at all Not at all    Over the last two weeks how often have you been bothered by feeling down, depressed or hopeless Not at all Not at all Not at all Not at all    PHQ-2 Total Score 0 0 0 0       Data saved with a previous flowsheet row definition       Active Problem List with Overview Notes    Diagnosis Date Noted    Post-operative wound abscess 06/10/2025    Transaminitis 05/31/2025    Preoperative examination 05/29/2025    H/O acute pancreatitis 05/29/2025    Herpes genitalis in women 05/27/2025    Intraductal papillary mucinous neoplasm of pancreas 03/04/2025    Dilated pancreatic duct 02/11/2025    Weakness 09/30/2024    Decreased range of motion of trunk and back 09/30/2024    Positive FIT (fecal immunochemical test) 08/22/2024    Internal hemorrhoids 08/22/2024    Hypertension 05/30/2023     Controlled on Valsartan /12.5         Class 1 obesity with serious comorbidity and body mass index (BMI) of 31.0 to 31.9 in adult 05/30/2023     The patient is asked to make an attempt to improve diet and exercise patterns to aid in medical management of this problem.  Cut out white carbs: bread, rice, pasta, potatoes. Exercise/walk 5x/week for at least 30 minutes  .        Atypical chest pain 02/08/2021     Stress test with DR. Crockett  was positive and repeat stress test here with Dr Read was negative  Following cardiology  Now taking prilosec       Postmenopausal bleeding 05/16/2018    Pneumonitis 05/04/2017    Neuropathy involving both lower extremities 05/04/2017    Viral warts 05/04/2017       Past Medical History:   Diagnosis Date    Herpes genitalis in women 05/27/2025    Hypertension     Hypertensive heart disease without heart failure 02/04/2021    Intraductal papillary mucinous neoplasm of pancreas 03/04/2025    Pancreatitis     4 times (2009,2013)    UTI (urinary tract infection)        Past Surgical History:   Procedure Laterality Date    COLONOSCOPY N/A 8/22/2024    Procedure: COLONOSCOPY;  Surgeon: Kya Garcia MD;  Location: [facility] ENDO;  Service: Endoscopy;  Laterality: N/A;    DILATION AND CURETTAGE OF UTERUS      ENDOSCOPIC ULTRASOUND OF UPPER GASTROINTESTINAL TRACT N/A 2/26/2025    Procedure: ULTRASOUND, UPPER GI TRACT, ENDOSCOPIC;  Surgeon: Abdi Mtz MD;  Location: [facility] ENDO;  Service: Endoscopy;  Laterality: N/A;  2/21 portal-EUS with anyone in next 2-3 weeks, either site should be ok. [id number]    MULTIPLE TOOTH EXTRACTIONS      vaginal delivery      varicose veins      WHIPPLE PROCEDURE N/A 5/29/2025    Procedure: WHIPPLE PROCEDURE;  Surgeon: Yolie Lieberman MD;  Location: [facility] OR;  Service: Oncology;  Laterality: N/A;       Family History   Problem Relation Name Age of Onset    Asthma Mother      Hypertension Mother      COPD Brother 3     Cancer Maternal Grandmother      Cancer Maternal Grandfather         Social History     Socioeconomic History    Marital status:      Spouse name: Shen Thompson    Number of children: 1   Tobacco Use    Smoking status: Never    Smokeless tobacco: Never   Substance and Sexual Activity    Alcohol use: Not Currently     Comment: occasionally    Drug use: No    Sexual activity: Yes     Partners: Male     Social Drivers of Health     Financial  Resource Strain: Low Risk  (6/10/2025)    Overall Financial Resource Strain (CARDIA)     Difficulty of Paying Living Expenses: Not very hard   Food Insecurity: No Food Insecurity (6/10/2025)    Hunger Vital Sign     Worried About Running Out of Food in the Last Year: Never true     Ran Out of Food in the Last Year: Never true   Transportation Needs: No Transportation Needs (6/10/2025)    PRAPARE - Transportation     Lack of Transportation (Medical): No     Lack of Transportation (Non-Medical): No   Physical Activity: Sufficiently Active (8/6/2024)    Exercise Vital Sign     Days of Exercise per Week: 5 days     Minutes of Exercise per Session: 30 min   Stress: No Stress Concern Present (6/10/2025)    [employer] - Occupational Stress Questionnaire     Feeling of Stress : Not at all   Housing Stability: Low Risk  (6/10/2025)    Housing Stability Vital Sign     Unable to Pay for Housing in the Last Year: No     Homeless in the Last Year: No       Medications Ordered Prior to Encounter[1]    Review of patient's allergies indicates:   Allergen Reactions    Dilaudid [hydromorphone] Itching     From IV    Nitrofurantoin Other (See Comments)     Cramping of legs         Review of Systems        Physical Exam  HENT:      Head: Normocephalic.   Cardiovascular:      Rate and Rhythm: Normal rate and regular rhythm.      Pulses: Normal pulses.      Heart sounds: Normal heart sounds.   Abdominal:      Palpations: Abdomen is soft.      Comments: Central incision healing well with no sign of infection.  Drainage site dressed well   Skin:     Findings: Lesion present.      Comments: Well demarcated, stuck on plaque, night 10 in color present on the left side of back   Neurological:      Mental Status: She is alert.        Assessment:     1. Intraductal papillary mucinous neoplasm of pancreas    2. H/O Whipple procedure    3. Insomnia, unspecified type    4. Skin lesion        LABS:   Lab  Results   Component Value Date    HGBA1C 5.3 08/06/2024    HGBA1C 5.3 02/07/2022    HGBA1C 5.2 01/19/2021      Lab Results   Component Value Date    CHOL 188 08/06/2024    CHOL 183 05/30/2023    CHOL 174 02/07/2022     Lab Results   Component Value Date    LDLCALC 112.2 08/06/2024    LDLCALC 100.8 02/07/2022    LDLCALC 98.6 01/19/2021     Lab Results   Component Value Date    WBC 8.54 06/12/2025    HGB 10.8 (L) 06/12/2025    HCT 33.9 (L) 06/12/2025     (H) 06/12/2025    CHOL 188 08/06/2024    TRIG 99 08/06/2024    HDL 56 08/06/2024    LDLDIRECT 110 05/30/2023     (H) 06/12/2025    AST 27 06/12/2025     06/12/2025    K 3.7 06/12/2025     06/12/2025    CREATININE 0.6 06/12/2025    BUN 20 06/12/2025    CO2 22 (L) 06/12/2025    TSH 2.847 08/06/2024    INR 0.9 06/04/2025    HGBA1C 5.3 08/06/2024       Plan:   Chel was seen today for hospital follow up.    Diagnoses and all orders for this visit:    Intraductal papillary mucinous neoplasm of pancreas    H/O Whipple procedure    Insomnia, unspecified type  -     zolpidem (AMBIEN) 5 MG Tab; Take 1 tablet (5 mg total) by mouth nightly as needed.    Skin lesion  -     Ambulatory referral/consult to Dermatology; Future        Assessment & Plan    (POST-WHIPPLE PROCEDURE):  - Reviewed recent Whipple procedure results from May 29th, noting absence of dysplasia or invasive margins, indicating a positive outcome.  - Ms. Thompson is recovering well with normal bowel function despite previously treated drainage site infection.  - Discussed expected 6-week recovery timeline for major intra-abdominal surgery.  - Advised against lifting heavy objects and recommended continuing walking as tolerated.  - Noted slightly elevated but improving ALT.  - Due to pancreas and gallbladder removal, explained potential need for monitoring fat-soluble vitamins, particularly vitamin D.  - Ordered repeat labs in 3 months and arranged weekly home health support for wound  care.    INSOMNIA:   reviewed  - Ms. Thompson reports sleeping only 2-3 hours nightly and feeling exhausted despite trying 2 trazodone tablets without relief.  - Recommend non-pharmacological approaches including relaxation techniques and deep breathing exercises before bed.  - Prescribed melatonin to be taken 30 minutes before bedtime and zolpidem (Ambien) as needed, limited to 10 tablets per month with instructions not to use daily.    ANEMIA:  - Observed hemoglobin at 10.8, attributed to recent major surgery.  - Will monitor with repeat labs in 3 months.    SEBORRHEIC KERATOSIS:  - Clinically assessed skin lesion on back, suspecting benign seborrheic keratosis.  - Referred to dermatology for full skin check and potential biopsy of back lesion.    TOENAIL FUNGUS:  - Identified fungus on one toenail.  - Deferred treatment due to potential liver complications from antifungal medication.  - Advised patient to monitor without treatment.    FOLLOW-UP:  - Scheduled follow up in August for reassessment and lab work.  - Ms. Thompson advised to contact the office in 1 month if any additional needs arise.           Face to Face time with patient:  9:00 AM CDT -      Each patient to whom he or she provides medical services by telemedicine is:  (1) informed of the relationship between the physician and patient and the respective role of any other health care provider with respect to management of the patient; and (2) notified that he or she may decline to receive medical services by telemedicine and may withdraw from such care at any time.      There are no Patient Instructions on file for this visit.    No follow-ups on file.  The 10-year ASCVD risk score (Jessica CANALES, et al., 2019) is: 6.3%    Values used to calculate the score:      Age: 64 years      Sex: Female      Is Non- : No      Diabetic: No      Tobacco smoker: No      Systolic Blood Pressure: 134 mmHg      Is BP treated: Yes      HDL  Cholesterol: 56 mg/dL      Total Cholesterol: 188 mg/dL    This note was generated with the assistance of ambient listening technology. Verbal consent was obtained by the patient and accompanying visitor(s) for the recording of patient appointment to facilitate this note. I attest to having reviewed and edited the generated note for accuracy, though some syntax or spelling errors may persist. Please contact the author of this note for any clarification.           [1]  Current Outpatient Medications on File Prior to Visit   Medication Sig Dispense Refill    amoxicillin-clavulanate 875-125mg (AUGMENTIN) 875-125 mg per tablet Take 1 tablet by mouth 2 (two) times daily for 7 days 14 tablet 0    cyclobenzaprine (FLEXERIL) 5 MG tablet Take 1 tablet (5 mg total) by mouth 3 (three) times daily as needed for Muscle spasms. 60 tablet 2    metoclopramide HCl (REGLAN) 5 MG tablet Take 1 tablet (5 mg total) by mouth 3 (three) times daily before meals. (Patient taking differently: Take 5 mg by mouth 3 (three) times daily as needed.) 90 tablet 0    oxyCODONE (ROXICODONE) 5 MG immediate release tablet Take 1 tablet (5 mg total) by mouth every 4 (four) hours as needed for Pain. 20 tablet 0    traZODone (DESYREL) 50 MG tablet Take 1 tablet (50 mg total) by mouth every evening. 90 tablet 3    valsartan-hydrochlorothiazide (DIOVAN-HCT) 160-12.5 mg per tablet Take 1 tablet by mouth every morning.       No current facility-administered medications on file prior to visit.

## 2025-06-18 ENCOUNTER — TELEPHONE (OUTPATIENT)
Dept: SURGICAL ONCOLOGY | Facility: CLINIC | Age: 65
End: 2025-06-18
Payer: COMMERCIAL

## 2025-06-18 NOTE — TELEPHONE ENCOUNTER
Received completed medical leave paperwork from pt's spouse. Emailed paperwork to [email or web address]. Sent message via the same email asking them to contact the pt to let her know her status and that paperwork has been received. (Requested they contact me to let me know it was received) Faxed paperwork 2X to [phone]. Sent message asking them to let me know when it was received. Called pt to let her know it has been sent. I asked her to follow up on this paperwork. Pt verbalized understanding of all things discussed.

## 2025-06-19 LAB — BACTERIA SPEC ANAEROBE CULT: NORMAL

## 2025-06-24 ENCOUNTER — OFFICE VISIT (OUTPATIENT)
Dept: SURGICAL ONCOLOGY | Facility: CLINIC | Age: 65
End: 2025-06-24
Payer: COMMERCIAL

## 2025-06-24 ENCOUNTER — RESULTS FOLLOW-UP (OUTPATIENT)
Dept: SURGICAL ONCOLOGY | Facility: CLINIC | Age: 65
End: 2025-06-24

## 2025-06-24 ENCOUNTER — LAB VISIT (OUTPATIENT)
Dept: LAB | Facility: HOSPITAL | Age: 65
End: 2025-06-24
Attending: SURGERY
Payer: COMMERCIAL

## 2025-06-24 VITALS — BODY MASS INDEX: 27 KG/M2 | WEIGHT: 178.13 LBS | HEIGHT: 68 IN

## 2025-06-24 DIAGNOSIS — R53.83 FATIGUE, UNSPECIFIED TYPE: ICD-10-CM

## 2025-06-24 DIAGNOSIS — D49.0 IPMN (INTRADUCTAL PAPILLARY MUCINOUS NEOPLASM): Primary | ICD-10-CM

## 2025-06-24 LAB
ABSOLUTE NEUTROPHIL MANUAL (OHS): 7.6 K/UL
ANION GAP (OHS): 14 MMOL/L (ref 8–16)
BUN SERPL-MCNC: 40 MG/DL (ref 8–23)
CALCIUM SERPL-MCNC: 10.3 MG/DL (ref 8.7–10.5)
CHLORIDE SERPL-SCNC: 102 MMOL/L (ref 95–110)
CO2 SERPL-SCNC: 21 MMOL/L (ref 23–29)
CREAT SERPL-MCNC: 1.2 MG/DL (ref 0.5–1.4)
EOSINOPHIL NFR BLD MANUAL: 2 % (ref 0–8)
ERYTHROCYTE [DISTWIDTH] IN BLOOD BY AUTOMATED COUNT: 13.2 % (ref 11.5–14.5)
GFR SERPLBLD CREATININE-BSD FMLA CKD-EPI: 51 ML/MIN/1.73/M2
GLUCOSE SERPL-MCNC: 124 MG/DL (ref 70–110)
HCT VFR BLD AUTO: 42.7 % (ref 37–48.5)
HGB BLD-MCNC: 14.1 GM/DL (ref 12–16)
LYMPHOCYTES NFR BLD MANUAL: 14 % (ref 18–48)
MAGNESIUM SERPL-MCNC: 2.1 MG/DL (ref 1.6–2.6)
MCH RBC QN AUTO: 29.4 PG (ref 27–31)
MCHC RBC AUTO-ENTMCNC: 33 G/DL (ref 32–36)
MCV RBC AUTO: 89 FL (ref 82–98)
MONOCYTES NFR BLD MANUAL: 9 % (ref 4–15)
NEUTROPHILS NFR BLD MANUAL: 75 % (ref 38–73)
NUCLEATED RBC (/100WBC) (OHS): 0 /100 WBC
PLATELET # BLD AUTO: 498 K/UL (ref 150–450)
PLATELET BLD QL SMEAR: ABNORMAL
PMV BLD AUTO: 9.1 FL (ref 9.2–12.9)
POTASSIUM SERPL-SCNC: 3.6 MMOL/L (ref 3.5–5.1)
RBC # BLD AUTO: 4.79 M/UL (ref 4–5.4)
SODIUM SERPL-SCNC: 137 MMOL/L (ref 136–145)
WBC # BLD AUTO: 10.19 K/UL (ref 3.9–12.7)

## 2025-06-24 PROCEDURE — 82310 ASSAY OF CALCIUM: CPT

## 2025-06-24 PROCEDURE — 36415 COLL VENOUS BLD VENIPUNCTURE: CPT

## 2025-06-24 PROCEDURE — 1159F MED LIST DOCD IN RCRD: CPT | Mod: CPTII,S$GLB,, | Performed by: SURGERY

## 2025-06-24 PROCEDURE — 99024 POSTOP FOLLOW-UP VISIT: CPT | Mod: S$GLB,,, | Performed by: SURGERY

## 2025-06-24 PROCEDURE — 85025 COMPLETE CBC W/AUTO DIFF WBC: CPT

## 2025-06-24 PROCEDURE — 83735 ASSAY OF MAGNESIUM: CPT

## 2025-06-24 PROCEDURE — 99999 PR PBB SHADOW E&M-EST. PATIENT-LVL II: CPT | Mod: PBBFAC,,, | Performed by: SURGERY

## 2025-06-24 NOTE — PROGRESS NOTES
Surgical Oncology Clinic Note      Referring Provider: Dr. Abdi Mtz   PCP: Yesica Burk MD    Reason For Visit: Pancreas: cyst (IPMN)    HISTORY OF PRESENT ILLNESS     Chel Thompson is a 64 y.o. female with history of HTN who presents today for evaluation and management of main duct- IPMN.      She has a remote history of pancreatitis with 3 bouts of pancreatitis back in 2010.  She states that she was not drinking at that time and that nobody was ever able to tell her why she was having pancreatitis.  It sounds like it was shocked up has been idiopathic.  Then about 3 years later in 2013 she states she was on the way to work and suddenly had sudden onset of abdominal/chest pain was taken to the hospital and an extensive workup was done which ultimately revealed an additional episode of pancreatitis.  Around that time she had imaging done in the hospital either a CT or an MRI however is uncertain what it showed.  That was done at [facility].  She unfortunately does not have any records of that either.  Following that bout of pancreatitis she has never had any additional episodes or issues.    She was in her usual state of good health up until a few months ago when she started having right lower extremity pain for which she went to the [facility] in [place].  MRI of the lumbar spine was ordered which showed an evidence of degenerative changes but also showed what was concerning for a multilocular cystic mass in the head of the pancreas measuring approximately 4.4 cm which was incompletely characterized.  A subsequent MRI/MRCP of the abdomen was ordered and done on 11/26/2024.  This showed dilation of the main pancreatic duct in the pancreatic head and proximal body measuring up to 1.9 cm, increased compared to 2013.  There was also non masslike enhancement of the pancreatic head.  She was then referred to GI who she saw on 02/11/2025.  They referred her to  advanced endoscopy in [place] for an EUS.  That was done on 02/26/2025 by Dr. Mtz and revealed gaping ampulla with visible extrusion of mucin as well as dilated pancreatic duct in the pancreatic head in January of the pancreas measuring up to 20 mm in diameter, with tapering of and of the ductal dilatation in the body/tail.  There were no intraductal nodule seen on EUS in no pancreatic mass seen.    She is otherwise healthy.  She is a nonsmoker.  She drinks alcohol on rare occasions.  Family history is significant for prostate cancer in her brother as well as lung cancer in her maternal grandfather.  Otherwise no significant family history aside from what is noted below    Brother- prostate cancer, CAD   Materna grandfather=- lung cancer  Maternal grandmother- tumor in the eye  Maternal family is from [place] so limited information on them     INTERVAL HISTORY     04/15/2025:  She is doing well.  She has had no issues since our last visit.  Her brother successfully completed his operation and has been recovering well.  She presents to discuss the next steps of surgical planning.      5/27/2025:  doing well.  No concerns at this time.      6/24/2025:  Here for post op appointment- was discharged on POD #7 after KEVIN drain removal.  Imaging prior to d/c negative for undrained collection and amylase low.  She was readmitted 2 weeks later for purulent drainage from KEVIN drain site- there was no subcutaneous collection, there was a serous collection near the stomach which was aspirated by IR and was serous, amylase was 8.  She was discharged home after 2 days and was doing well until about 4 days ago when she started feeling really poorly.  Per her and her  she has had decreased appetite and has been eating little an feeling more and more weak.  She denies fevers, chills, nausea, and vomiting.  She is having some diarrhea but denies any greasy stools or cramping.  She presents in a wheelchair today.      Past  Medical History:   Diagnosis Date    Herpes genitalis in women 05/27/2025    Hypertension     Hypertensive heart disease without heart failure 02/04/2021    Intraductal papillary mucinous neoplasm of pancreas 03/04/2025    Pancreatitis     4 times (2009,2013)    UTI (urinary tract infection)        Past Surgical History:   Procedure Laterality Date    COLONOSCOPY N/A 8/22/2024    Procedure: COLONOSCOPY;  Surgeon: Kya Garcia MD;  Location: [facility] ENDO;  Service: Endoscopy;  Laterality: N/A;    DILATION AND CURETTAGE OF UTERUS      ENDOSCOPIC ULTRASOUND OF UPPER GASTROINTESTINAL TRACT N/A 2/26/2025    Procedure: ULTRASOUND, UPPER GI TRACT, ENDOSCOPIC;  Surgeon: Abdi Mtz MD;  Location: [facility] ENDO;  Service: Endoscopy;  Laterality: N/A;  2/21 portal-EUS with anyone in next 2-3 weeks, either site should be ok. [email or web address]    MULTIPLE TOOTH EXTRACTIONS      vaginal delivery      varicose veins      WHIPPLE PROCEDURE N/A 5/29/2025    Procedure: WHIPPLE PROCEDURE;  Surgeon: Yolie Lieberman MD;  Location: [facility] OR;  Service: Oncology;  Laterality: N/A;       Family History   Problem Relation Name Age of Onset    Asthma Mother      Hypertension Mother      COPD Brother 3     Cancer Maternal Grandmother      Cancer Maternal Grandfather         Social History[1]       Medication List            Accurate as of June 24, 2025  5:06 PM. If you have any questions, ask your nurse or doctor.                CHANGE how you take these medications      metoclopramide HCl 5 MG tablet  Commonly known as: REGLAN  Take 1 tablet (5 mg total) by mouth 3 (three) times daily before meals.  What changed:   when to take this  reasons to take this            CONTINUE taking these medications      cyclobenzaprine 5 MG tablet  Commonly known as: FLEXERIL  Take 1 tablet (5 mg total) by mouth 3 (three) times daily as needed for Muscle spasms.     oxyCODONE 5 MG immediate release tablet  Commonly known as: ROXICODONE  Take 1 tablet (5 mg  "total) by mouth every 4 (four) hours as needed for Pain.     traZODone 50 MG tablet  Commonly known as: DESYREL  Take 1 tablet (50 mg total) by mouth every evening.     valsartan-hydrochlorothiazide 160-12.5 mg per tablet  Commonly known as: DIOVAN-HCT     zolpidem 5 MG Tab  Commonly known as: AMBIEN  Take 1 tablet (5 mg total) by mouth nightly as needed.              Review of patient's allergies indicates:   Allergen Reactions    Dilaudid [hydromorphone] Itching     From IV    Nitrofurantoin Other (See Comments)     Cramping of legs       ROS      PHYSICAL EXAM     There were no vitals filed for this visit.    Body mass index is 27.08 kg/m².  ECOG SCORE             Physical Exam   Thinner, appears weak, multiple bruises on her arms, midline incision well healed     DATA REVIEW:  I personally reviewed the following records for this visit: lab work from prior visit, notes from other physicians, magnetic resonance/MR imaging, surgical pathology results, endoscopy results, radiographic study evaluation, and laboratory results done by primary care physician    LABS       Lab Results   Component Value Date    WBC 10.19 06/24/2025    HGB 14.1 06/24/2025    HCT 42.7 06/24/2025     (H) 06/24/2025     Lab Results   Component Value Date     (H) 06/24/2025    CALCIUM 10.3 06/24/2025    ALBUMIN 2.6 (L) 06/12/2025    PROT 6.3 06/12/2025     06/24/2025    K 3.6 06/24/2025    CO2 21 (L) 06/24/2025     06/24/2025    BUN 40 (H) 06/24/2025    CREATININE 1.2 06/24/2025    ALKPHOS 98 06/12/2025     (H) 06/12/2025    AST 27 06/12/2025    BILITOT 0.5 06/12/2025       Nutritional:  No results found for: "PREALBUMIN"    Tumor Markers:  Lab Results   Component Value Date    AMYLASE 269 (H) 05/30/2025     No results found for: ""    PATHOLOGY     05/29/2025:  Whipple  Final Diagnosis   1. Hernia sac, excision:  Fibroadipose tissue with congestion.     2. Hepatic artery lymph node, " lymphadenectomy:  Three lymph nodes, negative for carcinoma (0/3).     3. Gallbladder, cholecystectomy:  Gallbladder with minimal chronic cholecystitis.  One lymph node, negative for carcinoma (0/1).     4. Pancreatoduodenectomy:  Intraductal papillary mucinous neoplasm (IPMN), measuring 4.2 cm in greatest dimension, with extensive high-grade dysplasia.  Surgical margins are negative for dysplasia.  Negative for invasive carcinoma.  Thirty lymph nodes, negative for carcinoma (0/30).  Uninvolved pancreas with atrophy.  Uninvolved stomach and duodenum with no diagnostic abnormality.     5. Omentum, resection:  Fibroadipose tissue, with no diagnostic abnormality.     6. Stomach, resection:  Segment of stomach, lined by oxyntic mucosa, with no diagnostic abnormality.  One lymph node, negative for carcinoma (0/1).          IMAGING     11/19/2024:  MRI Lumbar Spine  Impression:  1. Multilevel degenerative changes of the lumbar spine, as detailed above, resulting in mild neural foraminal and spinal canal stenosis at L2-3 through L4-5.  2. Multilocular cystic mass at the head of the pancreas, measuring on the order of 4.1 cm, incompletely characterized.  Recommend further evaluation with MRI/MRCP with and without contrast.  This report was flagged in Epic as abnormal.    11/26/2024:  MRI/ MRCP Abdomen  Impression:  1. Dilatation main pancreatic duct which appears increased to 2013.  Differential considerations include upstream dilatation the setting of scarring prior episodes of pancreatitis and main branch IPMN.  Recommend GI consultation and consideration for ERCP.  2. Hepatomegaly and hepatic steatosis.    02/26/2025:  EUS (Dr. Mtz)  No sign of significant and a sonographic abnormality in the common bile duct.  The pancreatic duct had a dilated and a sonographic parents in the pancreatic head in January of the pancreas.  The duct measured up to 20 mm in diameter, with tapering of the ductal dilatation in the body/tail.   There were no intraductal nodule seen on EUS.  No pancreatic mass was seen.  A gaping ampulla with visible extrusion of mucin was seen intra procedurally.    ASSESSMENT & PLAN     1. IPMN (intraductal papillary mucinous neoplasm)    2. Fatigue, unspecified type       Chel Thompson is a 64 y.o. female with  main duct IPMN s/p whipple on 05/29/2025.    She was doing very well up until this point- even when she was readmitted for a day with purulent drainage from the KEVIN drain site she was feeling normal and doing well.  I discussed with her and her  that I am not sure if this is 2/2 a pancreas leak or due to poor PO intake.  We will check labs today and get a CT scan to r/o leak.  Assuming that is negative I have encouraged her to work on PO intake with increased intake of high calorie protein shakes.      We also reviewed her pathology today which showed extensive high grade dysplasia but no evidence of invasive carcinoma.  Fortunately she will not need chemotherapy, but we will continue to surveil the remainder of her pancreas.      -- CT Abdomen/ Pelvis STAT  -- CBC, BMP, and Mag   -- encourage PO intake  -- will need repeat MRI every 6 mo for the first 2 years, followed by yearly surveillance- based on the  evidence-based guidelines on pancreatic cystic neoplasms (EURO)    Ms. Thompson expressed understanding in regards to our discussion today. Many good questions were asked on today's visit, all of which were answered to their satisfaction.    Follow-up: Follow up in about 1 week (around 7/1/2025).                  Yolie Lieberman MD, MS, [facility]  Board Certified Surgical Oncologist   [facility]- [place], [place]  Office: [phone]            Orders Placed This Encounter   Procedures    CT Abdomen Pelvis With IV Contrast NO Oral Contrast     Standing Status:   Future     Expected Date:   6/24/2025     Expiration Date:   6/24/2026     Is the patient allergic  to iodine contrast?:   No     Does this patient have impaired renal function?:   No     May the Radiologist modify the order per protocol to meet the clinical needs of the patient?:   Yes     Oral/Rectal Contrast instructions::   NO Oral Contrast    CBC Auto Differential     Standing Status:   Future     Number of Occurrences:   1     Expected Date:   6/24/2025     Expiration Date:   8/23/2026    Basic Metabolic Panel     Standing Status:   Future     Number of Occurrences:   1     Expected Date:   6/24/2025     Expiration Date:   8/23/2026    Magnesium     Standing Status:   Future     Number of Occurrences:   1     Expected Date:   6/24/2025     Expiration Date:   9/22/2026     Send normal result to authorizing provider's In Basket if patient is active on MyChart::   Yes                   [1]   Social History  Socioeconomic History    Marital status:      Spouse name: Shen Thompson    Number of children: 1   Tobacco Use    Smoking status: Never    Smokeless tobacco: Never   Substance and Sexual Activity    Alcohol use: Not Currently     Comment: occasionally    Drug use: No    Sexual activity: Yes     Partners: Male     Social Drivers of Health     Financial Resource Strain: Low Risk  (6/10/2025)    Overall Financial Resource Strain (CARDIA)     Difficulty of Paying Living Expenses: Not very hard   Food Insecurity: No Food Insecurity (6/10/2025)    Hunger Vital Sign     Worried About Running Out of Food in the Last Year: Never true     Ran Out of Food in the Last Year: Never true   Transportation Needs: No Transportation Needs (6/10/2025)    PRAPARE - Transportation     Lack of Transportation (Medical): No     Lack of Transportation (Non-Medical): No   Physical Activity: Sufficiently Active (8/6/2024)    Exercise Vital Sign     Days of Exercise per Week: 5 days     Minutes of Exercise per Session: 30 min   Stress: No Stress Concern Present (6/10/2025)    Citizen of [place] [employer] - Occupational  Stress Questionnaire     Feeling of Stress : Not at all   Housing Stability: Low Risk  (6/10/2025)    Housing Stability Vital Sign     Unable to Pay for Housing in the Last Year: No     Homeless in the Last Year: No

## 2025-06-25 ENCOUNTER — TELEPHONE (OUTPATIENT)
Dept: SURGICAL ONCOLOGY | Facility: CLINIC | Age: 65
End: 2025-06-25
Payer: COMMERCIAL

## 2025-06-25 ENCOUNTER — HOSPITAL ENCOUNTER (OUTPATIENT)
Dept: RADIOLOGY | Facility: HOSPITAL | Age: 65
Discharge: HOME | End: 2025-06-25
Attending: SURGERY
Payer: COMMERCIAL

## 2025-06-25 DIAGNOSIS — R53.83 FATIGUE, UNSPECIFIED TYPE: ICD-10-CM

## 2025-06-25 PROCEDURE — 74177 CT ABD & PELVIS W/CONTRAST: CPT | Mod: 26,,, | Performed by: RADIOLOGY

## 2025-06-25 PROCEDURE — 74177 CT ABD & PELVIS W/CONTRAST: CPT | Mod: TC

## 2025-06-25 PROCEDURE — 25500020 PHARM REV CODE 255: Performed by: SURGERY

## 2025-06-25 RX ADMIN — IOHEXOL 100 ML: 350 INJECTION, SOLUTION INTRAVENOUS at 10:06

## 2025-06-25 NOTE — TELEPHONE ENCOUNTER
Called pt to check on her and to relay message from Dr. Lieberman that her CT came back with no leaks. I explained to her she is most likely not feeling well because she needs more food in her stomach. Pt agrees and she is actively trying to finish all of her protein shakes each day. I asked her to call should she have any questions or concerns.

## 2025-06-26 ENCOUNTER — HOSPITAL ENCOUNTER (OUTPATIENT)
Facility: HOSPITAL | Age: 65
Discharge: HOME | End: 2025-06-27
Attending: EMERGENCY MEDICINE | Admitting: STUDENT IN AN ORGANIZED HEALTH CARE EDUCATION/TRAINING PROGRAM
Payer: COMMERCIAL

## 2025-06-26 ENCOUNTER — TELEPHONE (OUTPATIENT)
Dept: SURGERY | Facility: CLINIC | Age: 65
End: 2025-06-26
Payer: COMMERCIAL

## 2025-06-26 DIAGNOSIS — Z98.890 POSTOPERATIVE NAUSEA AND VOMITING: ICD-10-CM

## 2025-06-26 DIAGNOSIS — R11.2 POSTOPERATIVE NAUSEA AND VOMITING: ICD-10-CM

## 2025-06-26 DIAGNOSIS — R11.2 INTRACTABLE NAUSEA AND VOMITING: Primary | ICD-10-CM

## 2025-06-26 LAB
ABSOLUTE EOSINOPHIL (OHS): 0.22 K/UL
ABSOLUTE MONOCYTE (OHS): 0.79 K/UL (ref 0.3–1)
ABSOLUTE NEUTROPHIL COUNT (OHS): 4.27 K/UL (ref 1.8–7.7)
ALBUMIN SERPL BCP-MCNC: 3.8 G/DL (ref 3.5–5.2)
ALP SERPL-CCNC: 87 UNIT/L (ref 40–150)
ALT SERPL W/O P-5'-P-CCNC: 56 UNIT/L (ref 10–44)
ANION GAP (OHS): 17 MMOL/L (ref 8–16)
AST SERPL-CCNC: 41 UNIT/L (ref 11–45)
BASOPHILS # BLD AUTO: 0.05 K/UL
BASOPHILS NFR BLD AUTO: 0.7 %
BILIRUB SERPL-MCNC: 0.4 MG/DL (ref 0.1–1)
BUN SERPL-MCNC: 54 MG/DL (ref 8–23)
CALCIUM SERPL-MCNC: 9.7 MG/DL (ref 8.7–10.5)
CHLORIDE SERPL-SCNC: 102 MMOL/L (ref 95–110)
CO2 SERPL-SCNC: 18 MMOL/L (ref 23–29)
CREAT SERPL-MCNC: 0.9 MG/DL (ref 0.5–1.4)
ERYTHROCYTE [DISTWIDTH] IN BLOOD BY AUTOMATED COUNT: 13.4 % (ref 11.5–14.5)
GFR SERPLBLD CREATININE-BSD FMLA CKD-EPI: >60 ML/MIN/1.73/M2
GLUCOSE SERPL-MCNC: 100 MG/DL (ref 70–110)
HCT VFR BLD AUTO: 40.4 % (ref 37–48.5)
HGB BLD-MCNC: 13.3 GM/DL (ref 12–16)
IMM GRANULOCYTES # BLD AUTO: 0.09 K/UL (ref 0–0.04)
IMM GRANULOCYTES NFR BLD AUTO: 1.3 % (ref 0–0.5)
LIPASE SERPL-CCNC: 11 U/L (ref 4–60)
LYMPHOCYTES # BLD AUTO: 1.54 K/UL (ref 1–4.8)
MCH RBC QN AUTO: 29.1 PG (ref 27–31)
MCHC RBC AUTO-ENTMCNC: 32.9 G/DL (ref 32–36)
MCV RBC AUTO: 88 FL (ref 82–98)
NUCLEATED RBC (/100WBC) (OHS): 0 /100 WBC
PLATELET # BLD AUTO: 358 K/UL (ref 150–450)
PMV BLD AUTO: 9.2 FL (ref 9.2–12.9)
POTASSIUM SERPL-SCNC: 4.5 MMOL/L (ref 3.5–5.1)
PROT SERPL-MCNC: 8.5 GM/DL (ref 6–8.4)
RBC # BLD AUTO: 4.57 M/UL (ref 4–5.4)
RELATIVE EOSINOPHIL (OHS): 3.2 %
RELATIVE LYMPHOCYTE (OHS): 22.1 % (ref 18–48)
RELATIVE MONOCYTE (OHS): 11.4 % (ref 4–15)
RELATIVE NEUTROPHIL (OHS): 61.3 % (ref 38–73)
SODIUM SERPL-SCNC: 137 MMOL/L (ref 136–145)
WBC # BLD AUTO: 6.96 K/UL (ref 3.9–12.7)

## 2025-06-26 PROCEDURE — 25000003 PHARM REV CODE 250: Performed by: EMERGENCY MEDICINE

## 2025-06-26 PROCEDURE — 96376 TX/PRO/DX INJ SAME DRUG ADON: CPT

## 2025-06-26 PROCEDURE — 85025 COMPLETE CBC W/AUTO DIFF WBC: CPT | Performed by: EMERGENCY MEDICINE

## 2025-06-26 PROCEDURE — G0378 HOSPITAL OBSERVATION PER HR: HCPCS

## 2025-06-26 PROCEDURE — 63600175 PHARM REV CODE 636 W HCPCS: Performed by: EMERGENCY MEDICINE

## 2025-06-26 PROCEDURE — 99024 POSTOP FOLLOW-UP VISIT: CPT | Mod: ,,, | Performed by: STUDENT IN AN ORGANIZED HEALTH CARE EDUCATION/TRAINING PROGRAM

## 2025-06-26 PROCEDURE — 96374 THER/PROPH/DIAG INJ IV PUSH: CPT

## 2025-06-26 PROCEDURE — A9698 NON-RAD CONTRAST MATERIALNOC: HCPCS | Performed by: EMERGENCY MEDICINE

## 2025-06-26 PROCEDURE — 96375 TX/PRO/DX INJ NEW DRUG ADDON: CPT

## 2025-06-26 PROCEDURE — 96361 HYDRATE IV INFUSION ADD-ON: CPT

## 2025-06-26 PROCEDURE — 25500020 PHARM REV CODE 255: Performed by: EMERGENCY MEDICINE

## 2025-06-26 PROCEDURE — 99285 EMERGENCY DEPT VISIT HI MDM: CPT | Mod: 25

## 2025-06-26 PROCEDURE — 82040 ASSAY OF SERUM ALBUMIN: CPT | Performed by: EMERGENCY MEDICINE

## 2025-06-26 PROCEDURE — 83690 ASSAY OF LIPASE: CPT | Performed by: EMERGENCY MEDICINE

## 2025-06-26 RX ORDER — SODIUM CHLORIDE 9 MG/ML
INJECTION, SOLUTION INTRAVENOUS CONTINUOUS
Status: DISCONTINUED | OUTPATIENT
Start: 2025-06-26 | End: 2025-06-27 | Stop reason: HOSPADM

## 2025-06-26 RX ORDER — SODIUM CHLORIDE 0.9 % (FLUSH) 0.9 %
10 SYRINGE (ML) INJECTION
Status: DISCONTINUED | OUTPATIENT
Start: 2025-06-26 | End: 2025-06-27 | Stop reason: HOSPADM

## 2025-06-26 RX ORDER — METOCLOPRAMIDE HYDROCHLORIDE 5 MG/ML
10 INJECTION INTRAMUSCULAR; INTRAVENOUS
Status: COMPLETED | OUTPATIENT
Start: 2025-06-26 | End: 2025-06-26

## 2025-06-26 RX ORDER — FAMOTIDINE 10 MG/ML
20 INJECTION, SOLUTION INTRAVENOUS EVERY 12 HOURS
Status: DISCONTINUED | OUTPATIENT
Start: 2025-06-26 | End: 2025-06-27 | Stop reason: HOSPADM

## 2025-06-26 RX ORDER — ONDANSETRON HYDROCHLORIDE 2 MG/ML
4 INJECTION, SOLUTION INTRAVENOUS
Status: COMPLETED | OUTPATIENT
Start: 2025-06-26 | End: 2025-06-26

## 2025-06-26 RX ORDER — TALC
6 POWDER (GRAM) TOPICAL NIGHTLY PRN
Status: DISCONTINUED | OUTPATIENT
Start: 2025-06-26 | End: 2025-06-27 | Stop reason: HOSPADM

## 2025-06-26 RX ORDER — FAMOTIDINE 10 MG/ML
20 INJECTION, SOLUTION INTRAVENOUS
Status: COMPLETED | OUTPATIENT
Start: 2025-06-26 | End: 2025-06-26

## 2025-06-26 RX ORDER — ACETAMINOPHEN 325 MG/1
650 TABLET ORAL EVERY 8 HOURS PRN
Status: DISCONTINUED | OUTPATIENT
Start: 2025-06-26 | End: 2025-06-27 | Stop reason: HOSPADM

## 2025-06-26 RX ORDER — MORPHINE SULFATE 4 MG/ML
4 INJECTION, SOLUTION INTRAMUSCULAR; INTRAVENOUS EVERY 4 HOURS PRN
Status: DISCONTINUED | OUTPATIENT
Start: 2025-06-26 | End: 2025-06-27 | Stop reason: HOSPADM

## 2025-06-26 RX ORDER — MORPHINE SULFATE 2 MG/ML
2 INJECTION, SOLUTION INTRAMUSCULAR; INTRAVENOUS EVERY 4 HOURS PRN
Status: DISCONTINUED | OUTPATIENT
Start: 2025-06-26 | End: 2025-06-27 | Stop reason: HOSPADM

## 2025-06-26 RX ORDER — PROCHLORPERAZINE EDISYLATE 5 MG/ML
5 INJECTION INTRAMUSCULAR; INTRAVENOUS EVERY 6 HOURS PRN
Status: DISCONTINUED | OUTPATIENT
Start: 2025-06-26 | End: 2025-06-27 | Stop reason: HOSPADM

## 2025-06-26 RX ORDER — ONDANSETRON HYDROCHLORIDE 2 MG/ML
4 INJECTION, SOLUTION INTRAVENOUS EVERY 12 HOURS PRN
Status: DISCONTINUED | OUTPATIENT
Start: 2025-06-26 | End: 2025-06-27 | Stop reason: HOSPADM

## 2025-06-26 RX ORDER — METOCLOPRAMIDE HYDROCHLORIDE 5 MG/ML
10 INJECTION INTRAMUSCULAR; INTRAVENOUS EVERY 6 HOURS
Status: DISCONTINUED | OUTPATIENT
Start: 2025-06-26 | End: 2025-06-27 | Stop reason: HOSPADM

## 2025-06-26 RX ADMIN — MORPHINE SULFATE 4 MG: 4 INJECTION INTRAVENOUS at 10:06

## 2025-06-26 RX ADMIN — SODIUM CHLORIDE 1000 ML: 9 INJECTION, SOLUTION INTRAVENOUS at 02:06

## 2025-06-26 RX ADMIN — FAMOTIDINE 20 MG: 10 INJECTION, SOLUTION INTRAVENOUS at 07:06

## 2025-06-26 RX ADMIN — IOHEXOL 500 ML: 12 SOLUTION ORAL at 05:06

## 2025-06-26 RX ADMIN — SODIUM CHLORIDE: 9 INJECTION, SOLUTION INTRAVENOUS at 11:06

## 2025-06-26 RX ADMIN — ONDANSETRON 4 MG: 2 INJECTION INTRAMUSCULAR; INTRAVENOUS at 02:06

## 2025-06-26 RX ADMIN — ONDANSETRON 4 MG: 2 INJECTION INTRAMUSCULAR; INTRAVENOUS at 04:06

## 2025-06-26 RX ADMIN — METOCLOPRAMIDE 10 MG: 5 INJECTION, SOLUTION INTRAMUSCULAR; INTRAVENOUS at 07:06

## 2025-06-26 NOTE — ED PROVIDER NOTES
SCRIBE #1 NOTE: I, Sayda Geovanni, am scribing for, and in the presence of, Luigi Cotton MD and Adin Brewer Jr., MD. I have scribed the HPI, ROS, and PEx.     SCRIBE #2 NOTE: I, Tiana Danica, am scribing for, and in the presence of,  Adin Brewer Jr., MD. I have scribed the remaining portions of the note not scribed by Scribe #1.      History     Chief Complaint   Patient presents with    Emesis     Wipple done of 5/29. Since then persistent +NV. +weak -chest pain/ sob      Review of patient's allergies indicates:   Allergen Reactions    Dilaudid [hydromorphone] Itching     From IV    Nitrofurantoin Other (See Comments)     Cramping of legs         History of Present Illness     HPI    6/26/2025, 2:36 PM  History obtained from the patient and medical records      History of Present Illness: Chel Thompson is a 64 y.o. female patient with a PMHx of pancreatitis, UTI, HTN, and recent whipple done on 5/29/2025 who presents to the Emergency Department for evaluation of n/v which began after her whipple procedure. Pt called Dr. Yolie Lieberman MD (General Surgery) about her sxs and was advised to go to the ED. Pt reports she could not hold anything down since 3 days, including her Ensure MAX protein drink. Symptoms are constant and moderate in severity. Associated sxs include abdominal cramping this morning. Patient denies any diarrhea or fever. No further complaints or concerns at this time.       Arrival mode: Personal Transportation    PCP: Yesica Burk MD        Past Medical History:  Past Medical History:   Diagnosis Date    Herpes genitalis in women 05/27/2025    Hypertension     Hypertensive heart disease without heart failure 02/04/2021    Intraductal papillary mucinous neoplasm of pancreas 03/04/2025    Pancreatitis     4 times (2009,2013)    UTI (urinary tract infection)        Past Surgical History:  Past Surgical History:   Procedure Laterality Date    COLONOSCOPY N/A 8/22/2024     Procedure: COLONOSCOPY;  Surgeon: Kya Garcia MD;  Location: [facility] ENDO;  Service: Endoscopy;  Laterality: N/A;    DILATION AND CURETTAGE OF UTERUS      ENDOSCOPIC ULTRASOUND OF UPPER GASTROINTESTINAL TRACT N/A 2/26/2025    Procedure: ULTRASOUND, UPPER GI TRACT, ENDOSCOPIC;  Surgeon: Abdi Mtz MD;  Location: [facility] ENDO;  Service: Endoscopy;  Laterality: N/A;  2/21 portal-EUS with anyone in next 2-3 weeks, either site should be ok. [email or web address]    MULTIPLE TOOTH EXTRACTIONS      vaginal delivery      varicose veins      WHIPPLE PROCEDURE N/A 5/29/2025    Procedure: WHIPPLE PROCEDURE;  Surgeon: Yolie Lieberman MD;  Location: [facility] OR;  Service: Oncology;  Laterality: N/A;         Family History:  Family History   Problem Relation Name Age of Onset    Asthma Mother      Hypertension Mother      COPD Brother 3     Cancer Maternal Grandmother      Cancer Maternal Grandfather         Social History:  Social History     Tobacco Use    Smoking status: Never    Smokeless tobacco: Never   Substance and Sexual Activity    Alcohol use: Not Currently     Comment: occasionally    Drug use: No    Sexual activity: Yes     Partners: Male        Review of Systems     Review of Systems   Constitutional:  Negative for fever.   HENT:  Negative for sore throat.    Respiratory:  Negative for shortness of breath.    Cardiovascular:  Negative for chest pain.   Gastrointestinal:  Positive for abdominal pain (cramping this morning), nausea and vomiting. Negative for diarrhea.   Genitourinary:  Negative for dysuria.   Musculoskeletal:  Negative for back pain.   Skin:  Negative for rash.   Neurological:  Negative for weakness.   Hematological:  Does not bruise/bleed easily.   All other systems reviewed and are negative.     Physical Exam     Initial Vitals [06/26/25 1418]   BP Pulse Resp Temp SpO2   113/68 (!) 116 18 97.1 °F (36.2 °C) 96 %      MAP       --          Physical Exam  Nursing Notes and Vital Signs  Reviewed.  Constitutional: Patient is in mild distress. Well-developed and well-nourished.  Head: Atraumatic. Normocephalic.  Eyes: PERRL. EOM intact. Conjunctivae are not pale. No scleral icterus.  ENT: Mucous membranes are moist. Oropharynx is clear and symmetric.    Neck: Supple. Full ROM. No lymphadenopathy.  Cardiovascular: Tachycardic. Regular rhythm. No murmurs, rubs, or gallops. Distal pulses are 2+ and symmetric.  Pulmonary/Chest: No respiratory distress. Clear to auscultation bilaterally. No wheezing or rales.  Abdominal: Soft and non-distended.  There is no tenderness.  No rebound, guarding, or rigidity. Good bowel sounds.  Genitourinary: No CVA tenderness  Musculoskeletal: Moves all extremities. No obvious deformities. No edema. No calf tenderness.  Skin: Warm and dry.  Neurological:  Alert, awake, and appropriate.  Normal speech.  No acute focal neurological deficits are appreciated.  Psychiatric: Normal affect. Good eye contact. Appropriate in content.     ED Course   Procedures  ED Vital Signs:  Vitals:    06/26/25 1418 06/26/25 1459 06/26/25 1533 06/26/25 1633   BP: 113/68 102/70 119/68 112/77   Pulse: (!) 116 98 97 102   Resp: 18 20 (!) 22 19   Temp: 97.1 °F (36.2 °C)      TempSrc: Oral      SpO2: 96% 96% 97% 97%   Weight:  82.1 kg (181 lb 1.6 oz)         Abnormal Lab Results:  Labs Reviewed   COMPREHENSIVE METABOLIC PANEL - Abnormal       Result Value    Sodium 137      Potassium 4.5      Chloride 102      CO2 18 (*)     Glucose 100      BUN 54 (*)     Creatinine 0.9      Calcium 9.7      Protein Total 8.5 (*)     Albumin 3.8      Bilirubin Total 0.4      ALP 87      AST 41      ALT 56 (*)     Anion Gap 17 (*)     eGFR >60     CBC WITH DIFFERENTIAL - Abnormal    WBC 6.96      RBC 4.57      HGB 13.3      HCT 40.4      MCV 88      MCH 29.1      MCHC 32.9      RDW 13.4      Platelet Count 358      MPV 9.2      Nucleated RBC 0      Neut % 61.3      Lymph % 22.1      Mono % 11.4      Eos % 3.2       Basophil % 0.7      Imm Grans % 1.3 (*)     Neut # 4.27      Lymph # 1.54      Mono # 0.79      Eos # 0.22      Baso # 0.05      Imm Grans # 0.09 (*)    LIPASE - Normal    Lipase Level 11     CBC W/ AUTO DIFFERENTIAL    Narrative:     The following orders were created for panel order CBC Auto Differential.  Procedure                               Abnormality         Status                     ---------                               -----------         ------                     CBC with Differential[[id number]]       Abnormal            Final result                 Please view results for these tests on the individual orders.   URINALYSIS, REFLEX TO URINE CULTURE        All Lab Results:  Results for orders placed or performed during the hospital encounter of 06/26/25   Comprehensive Metabolic Panel    Collection Time: 06/26/25  2:50 PM   Result Value Ref Range    Sodium 137 136 - 145 mmol/L    Potassium 4.5 3.5 - 5.1 mmol/L    Chloride 102 95 - 110 mmol/L    CO2 18 (L) 23 - 29 mmol/L    Glucose 100 70 - 110 mg/dL    BUN 54 (H) 8 - 23 mg/dL    Creatinine 0.9 0.5 - 1.4 mg/dL    Calcium 9.7 8.7 - 10.5 mg/dL    Protein Total 8.5 (H) 6.0 - 8.4 gm/dL    Albumin 3.8 3.5 - 5.2 g/dL    Bilirubin Total 0.4 0.1 - 1.0 mg/dL    ALP 87 40 - 150 unit/L    AST 41 11 - 45 unit/L    ALT 56 (H) 10 - 44 unit/L    Anion Gap 17 (H) 8 - 16 mmol/L    eGFR >60 >60 mL/min/1.73/m2   Lipase    Collection Time: 06/26/25  2:50 PM   Result Value Ref Range    Lipase Level 11 4 - 60 U/L   CBC with Differential    Collection Time: 06/26/25  2:57 PM   Result Value Ref Range    WBC 6.96 3.90 - 12.70 K/uL    RBC 4.57 4.00 - 5.40 M/uL    HGB 13.3 12.0 - 16.0 gm/dL    HCT 40.4 37.0 - 48.5 %    MCV 88 82 - 98 fL    MCH 29.1 27.0 - 31.0 pg    MCHC 32.9 32.0 - 36.0 g/dL    RDW 13.4 11.5 - 14.5 %    Platelet Count 358 150 - 450 K/uL    MPV 9.2 9.2 - 12.9 fL    Nucleated RBC 0 <=0 /100 WBC    Neut % 61.3 38 - 73 %    Lymph % 22.1 18 - 48 %    Mono % 11.4 4 -  15 %    Eos % 3.2 <=8 %    Basophil % 0.7 <=1.9 %    Imm Grans % 1.3 (H) 0.0 - 0.5 %    Neut # 4.27 1.8 - 7.7 K/uL    Lymph # 1.54 1 - 4.8 K/uL    Mono # 0.79 0.3 - 1 K/uL    Eos # 0.22 <=0.5 K/uL    Baso # 0.05 <=0.2 K/uL    Imm Grans # 0.09 (H) 0.00 - 0.04 K/uL       Imaging Results:  Imaging Results              CT Abdomen Pelvis  Without Contrast (Final result)  Result time 06/26/25 17:28:13      Final result by Get Andersen DO (06/26/25 17:28:13)                   Impression:     No new acute findings.    All CT scans at [this location] are performed using dose modulation techniques as appropriate to a performed exam including the following:  Automated exposure control; adjustment of the mA and/or kV according to patient size (this includes techniques or standardized protocols for targeted exams where dose is matched to indication / reason for exam; i.e. extremities or head); use of iterative reconstruction technique.    Finalized on: 6/26/2025 5:28 PM By:  Get Andersen  [facility]# [id number]      2025-06-26 17:30:19.621     [facility]               Narrative:    EXAM: CT ABDOMEN PELVIS WITHOUT CONTRAST    CLINICAL HISTORY: Concern for bowel obstruction    COMPARISON: 06/25/2025, 06/10/2025.    TECHNIQUE: Standard thin-section axial images, with reformatted sagittal and coronal images.    FINDINGS: Linear bands of atelectasis, lower lobes.2    Fatty infiltration of the liver, liver is otherwise unremarkable within limits of a noncontrast CT.    Within limits of a noncontrast CT, the spleen, pancreas, adrenals, are unremarkable.  Parapelvic cyst at the interpolar region of the right kidney, kidneys and ureters are otherwise unremarkable.    2.4 cm circumscribed fluid collection adjacent to the anterior lateral aspect of the spleen, unchanged.  Nonspecific fluid/fat stranding adjacent to the pancreatic head, unchanged.    Postsurgical changes at the stomach.  No new abdominal or retroperitoneal lymphadenopathy.  No  dilated loops of large or small bowel.  No evidence for appendicitis.    Pelvis: No pelvic free fluid, iliac chain or inguinal lymphadenopathy.  Uterus and adnexal regions are unremarkable.  Contrast within the bladder.    No concerning lytic or sclerotic bony lesions.                                         No EKG ordered.           The Emergency Provider reviewed the vital signs and test results, which are outlined above.     ED Discussion     4:00 PM: Dr. Cotton transfers care of patient to Dr. Brewer pending imaging results.    5:44 PM: Discussed case with Caro Brar RN (General Surgery). Dr. Lieberman agrees with current care and management of pt and accepts admission.   Admitting Service: General Surgery  Admitting Physician: Dr. Lieberman  Admit to: obs    5:44 PM: Re-evaluated pt. I have discussed test results, shared treatment plan, and the need for admission with patient/family/caretaker at bedside. Pt and/or family/caretaker express understanding at this time and agree with all information. All questions answered. Pt/caretaker/family member(s) have no further questions or concerns at this time. Pt is ready for admit.         Medical Decision Making  Differential diagnosis: Nausea and vomiting, diarrhea, dehydration, intractable nausea and vomiting, intra-abdominal infection    The patient is post Whipple with stable fluid collection or abdomen.  That has now has a intractable nausea and vomiting is not tolerating p.o..  In consultation with General surgery she has been admitted for IV fluids and antiemetics.  I have discussed all findings with the patient was well as plan of care.  She verbalized her agreement and understanding with all.    Amount and/or Complexity of Data Reviewed  External Data Reviewed: labs, radiology and notes.  Labs: ordered. Decision-making details documented in ED Course.     Details: Normal white count.  Normal H&H.  Lipase is normal.  Anion gap is 17.  That has into his 18.  BUN  in his elevated 54 with a creatinine that has normal.  That has consistent with dehydration.  Radiology: ordered. Decision-making details documented in ED Course.     Details: Stable fluid collection.  No obstruction  Discussion of management or test interpretation with external provider(s): I discussed the case with Dr. Jackson who recommended admission to Dr. Louis with IV fluids and antiemetics.  She will notify Dr. Louis.    Risk  OTC drugs.  Prescription drug management.  Decision regarding hospitalization.                ED Medication(s):  Medications   metoclopramide injection 10 mg (has no administration in time range)   famotidine (PF) injection 20 mg (has no administration in time range)   metoclopramide injection 10 mg (has no administration in time range)   sodium chloride 0.9% bolus 1,000 mL 1,000 mL (0 mLs Intravenous Stopped 6/26/25 1646)   ondansetron injection 4 mg (4 mg Intravenous Given 6/26/25 1454)   ondansetron injection 4 mg (4 mg Intravenous Given 6/26/25 1654)   iohexoL (OMNIPAQUE 12) oral solution 500 mL (500 mLs Oral Given 6/26/25 1714)       New Prescriptions    No medications on file               Scribe Attestation:   Scribe #1: I performed the above scribed service and the documentation accurately describes the services I performed. I attest to the accuracy of the note.     Attending:   Physician Attestation Statement for Scribe #1: We, Luigi Cotton MD and Adin Brewer Jr., MD, personally performed the services described in this documentation, as scribed by Sayda Galarza, in my presence, and it is both accurate and complete.       Scribe Attestation:   Scribe #2: I performed the above scribed service and the documentation accurately describes the services I performed. I attest to the accuracy of the note.    Attending Attestation:           Physician Attestation for Scribe:    Physician Attestation Statement for Scribe #2: I, Adin Brewer Jr., MD, reviewed documentation,  as scribed by Tiana Mishra in my presence, and it is both accurate and complete. I also acknowledge and confirm the content of the note done by Scribe #1.           Clinical Impression       ICD-10-CM ICD-9-CM   1. Intractable nausea and vomiting  R11.2 536.2       Disposition:   Disposition: Placed in Observation  Condition: Stable       Adin Brewer Jr., MD  06/26/25 2132

## 2025-06-26 NOTE — Clinical Note
Diagnosis: Intractable nausea and vomiting [686655]   Future Attending Provider: KERRIE CARD [[id number]]

## 2025-06-26 NOTE — TELEPHONE ENCOUNTER
Pt called to let Dr. Lieberman know that she has been unable to keep anything down for past couple days. Pt had whipple procedure on 5/29. Advised pt to go to ED. Unable to send message via secure chat to Dr. Lieberman to make her aware immediately. Message sent via in-basket to Quita Acevedo MA and Dr. Lieberman. Pt states she will go to ED as soon as her  gets home.

## 2025-06-26 NOTE — PHARMACY MED REC
"Admission Medication History     The home medication history was taken by Danna Hairston.    You may go to "Admission" then "Reconcile Home Medications" tabs to review and/or act upon these items.     The home medication list has been updated by the Pharmacy department.   Please read ALL comments highlighted in yellow.   Please address this information as you see fit.    Feel free to contact us if you have any questions or require assistance.      The medications listed below were removed from the home medication list. Please reorder if appropriate:  Patient reports no longer taking the following medication(s):  Flexeril 5 mg  Reglan 5 mg  Roxicodone 5 mg IR  Trazodone 50 mg  Ambien 5 mg    Medications listed below were obtained from: Patient/family and Analytic software- [employer]      [facility] REC COMPLETED:     Danna Hairston  [id number]    Current Outpatient Medications on File Prior to Encounter   Medication Sig Dispense Refill Last Dose/Taking    oxyCODONE (ROXICODONE) 5 MG immediate release tablet Take 1 tablet (5 mg total) by mouth every 4 (four) hours as needed for Pain. 20 tablet 0 6/25/2025    valsartan-hydrochlorothiazide (DIOVAN-HCT) 160-12.5 mg per tablet Take 1 tablet by mouth every morning.   Past Week                           .          "

## 2025-06-27 ENCOUNTER — TELEPHONE (OUTPATIENT)
Dept: SURGICAL ONCOLOGY | Facility: CLINIC | Age: 65
End: 2025-06-27
Payer: COMMERCIAL

## 2025-06-27 VITALS
TEMPERATURE: 98 F | OXYGEN SATURATION: 96 % | HEART RATE: 91 BPM | WEIGHT: 182.75 LBS | HEIGHT: 68 IN | BODY MASS INDEX: 27.7 KG/M2 | SYSTOLIC BLOOD PRESSURE: 127 MMHG | RESPIRATION RATE: 18 BRPM | DIASTOLIC BLOOD PRESSURE: 77 MMHG

## 2025-06-27 PROBLEM — R11.2 POSTOPERATIVE NAUSEA AND VOMITING: Status: ACTIVE | Noted: 2025-06-27

## 2025-06-27 PROBLEM — Z98.890 POSTOPERATIVE NAUSEA AND VOMITING: Status: ACTIVE | Noted: 2025-06-27

## 2025-06-27 LAB
ABSOLUTE EOSINOPHIL (OHS): 0.19 K/UL
ABSOLUTE MONOCYTE (OHS): 0.64 K/UL (ref 0.3–1)
ABSOLUTE NEUTROPHIL COUNT (OHS): 3.39 K/UL (ref 1.8–7.7)
ALBUMIN SERPL BCP-MCNC: 3.1 G/DL (ref 3.5–5.2)
ALP SERPL-CCNC: 72 UNIT/L (ref 40–150)
ALT SERPL W/O P-5'-P-CCNC: 45 UNIT/L (ref 10–44)
ANION GAP (OHS): 14 MMOL/L (ref 8–16)
AST SERPL-CCNC: 29 UNIT/L (ref 11–45)
BASOPHILS # BLD AUTO: 0.03 K/UL
BASOPHILS NFR BLD AUTO: 0.5 %
BILIRUB SERPL-MCNC: 0.4 MG/DL (ref 0.1–1)
BUN SERPL-MCNC: 37 MG/DL (ref 8–23)
CALCIUM SERPL-MCNC: 8.8 MG/DL (ref 8.7–10.5)
CHLORIDE SERPL-SCNC: 108 MMOL/L (ref 95–110)
CO2 SERPL-SCNC: 18 MMOL/L (ref 23–29)
CREAT SERPL-MCNC: 0.7 MG/DL (ref 0.5–1.4)
ERYTHROCYTE [DISTWIDTH] IN BLOOD BY AUTOMATED COUNT: 13.4 % (ref 11.5–14.5)
GFR SERPLBLD CREATININE-BSD FMLA CKD-EPI: >60 ML/MIN/1.73/M2
GLUCOSE SERPL-MCNC: 78 MG/DL (ref 70–110)
HCT VFR BLD AUTO: 34.8 % (ref 37–48.5)
HGB BLD-MCNC: 11.1 GM/DL (ref 12–16)
IMM GRANULOCYTES # BLD AUTO: 0.03 K/UL (ref 0–0.04)
IMM GRANULOCYTES NFR BLD AUTO: 0.5 % (ref 0–0.5)
LYMPHOCYTES # BLD AUTO: 1.44 K/UL (ref 1–4.8)
MCH RBC QN AUTO: 29.3 PG (ref 27–31)
MCHC RBC AUTO-ENTMCNC: 31.9 G/DL (ref 32–36)
MCV RBC AUTO: 92 FL (ref 82–98)
NUCLEATED RBC (/100WBC) (OHS): 0 /100 WBC
PLATELET # BLD AUTO: 282 K/UL (ref 150–450)
PMV BLD AUTO: 9.4 FL (ref 9.2–12.9)
POTASSIUM SERPL-SCNC: 3.5 MMOL/L (ref 3.5–5.1)
PROT SERPL-MCNC: 6.5 GM/DL (ref 6–8.4)
RBC # BLD AUTO: 3.79 M/UL (ref 4–5.4)
RELATIVE EOSINOPHIL (OHS): 3.3 %
RELATIVE LYMPHOCYTE (OHS): 25.2 % (ref 18–48)
RELATIVE MONOCYTE (OHS): 11.2 % (ref 4–15)
RELATIVE NEUTROPHIL (OHS): 59.3 % (ref 38–73)
SODIUM SERPL-SCNC: 140 MMOL/L (ref 136–145)
WBC # BLD AUTO: 5.72 K/UL (ref 3.9–12.7)

## 2025-06-27 PROCEDURE — 85025 COMPLETE CBC W/AUTO DIFF WBC: CPT | Performed by: EMERGENCY MEDICINE

## 2025-06-27 PROCEDURE — 25000003 PHARM REV CODE 250: Performed by: EMERGENCY MEDICINE

## 2025-06-27 PROCEDURE — G0378 HOSPITAL OBSERVATION PER HR: HCPCS

## 2025-06-27 PROCEDURE — 96361 HYDRATE IV INFUSION ADD-ON: CPT

## 2025-06-27 PROCEDURE — 51701 INSERT BLADDER CATHETER: CPT

## 2025-06-27 PROCEDURE — 36415 COLL VENOUS BLD VENIPUNCTURE: CPT | Performed by: EMERGENCY MEDICINE

## 2025-06-27 PROCEDURE — 63600175 PHARM REV CODE 636 W HCPCS: Performed by: EMERGENCY MEDICINE

## 2025-06-27 PROCEDURE — 99024 POSTOP FOLLOW-UP VISIT: CPT | Mod: ,,, | Performed by: STUDENT IN AN ORGANIZED HEALTH CARE EDUCATION/TRAINING PROGRAM

## 2025-06-27 PROCEDURE — 96376 TX/PRO/DX INJ SAME DRUG ADON: CPT

## 2025-06-27 PROCEDURE — 63600175 PHARM REV CODE 636 W HCPCS: Performed by: STUDENT IN AN ORGANIZED HEALTH CARE EDUCATION/TRAINING PROGRAM

## 2025-06-27 PROCEDURE — 80053 COMPREHEN METABOLIC PANEL: CPT | Performed by: EMERGENCY MEDICINE

## 2025-06-27 RX ORDER — METOCLOPRAMIDE 10 MG/1
10 TABLET ORAL
Qty: 40 TABLET | Refills: 1 | Status: SHIPPED | OUTPATIENT
Start: 2025-06-27

## 2025-06-27 RX ADMIN — METOCLOPRAMIDE 10 MG: 5 INJECTION, SOLUTION INTRAMUSCULAR; INTRAVENOUS at 12:06

## 2025-06-27 RX ADMIN — FAMOTIDINE 20 MG: 10 INJECTION, SOLUTION INTRAVENOUS at 09:06

## 2025-06-27 RX ADMIN — METOCLOPRAMIDE 10 MG: 5 INJECTION, SOLUTION INTRAMUSCULAR; INTRAVENOUS at 01:06

## 2025-06-27 RX ADMIN — MORPHINE SULFATE 4 MG: 4 INJECTION INTRAVENOUS at 04:06

## 2025-06-27 RX ADMIN — SODIUM CHLORIDE: 9 INJECTION, SOLUTION INTRAVENOUS at 12:06

## 2025-06-27 RX ADMIN — METOCLOPRAMIDE 10 MG: 5 INJECTION, SOLUTION INTRAMUSCULAR; INTRAVENOUS at 05:06

## 2025-06-27 NOTE — PLAN OF CARE
Patient being discharged home in stable condition per MD order. Patient remains free from injury/falls during stay. IV removed per MD order, catheters intact. Discharge instructions reviewed with patient and pt provided with a copy of AVS, pt verbalized understanding of teaching.

## 2025-06-27 NOTE — PROGRESS NOTES
[facility] Surg  General Surgery  Progress Note    Subjective:     History of Present Illness:  No notes on file    Post-Op Info:  * No surgery found *         Interval History:  Only had ice chips overnight.  Denies nausea.  Previously her nausea was even without oral intake    Medications:  Continuous Infusions:   0.9% NaCl   Intravenous Continuous 75 mL/hr at 06/27/25 0410 Rate Verify at 06/27/25 0410     Scheduled Meds:   famotidine (PF)  20 mg Intravenous Q12H    metoclopramide  10 mg Intravenous Q6H     PRN Meds:  Current Facility-Administered Medications:     acetaminophen, 650 mg, Oral, Q8H PRN    melatonin, 6 mg, Oral, Nightly PRN    morphine, 2 mg, Intravenous, Q4H PRN    morphine, 4 mg, Intravenous, Q4H PRN    ondansetron, 4 mg, Intravenous, Q12H PRN    prochlorperazine, 5 mg, Intravenous, Q6H PRN    sodium chloride 0.9%, 10 mL, Intravenous, PRN     Review of patient's allergies indicates:   Allergen Reactions    Dilaudid [hydromorphone] Itching     From IV    Nitrofurantoin Other (See Comments)     Cramping of legs     Objective:     Vital Signs (Most Recent):  Temp: 97.9 °F (36.6 °C) (06/27/25 0745)  Pulse: 94 (06/27/25 0745)  Resp: 17 (06/27/25 0745)  BP: 104/69 (06/27/25 0745)  SpO2: (!) 93 % (06/27/25 0745) Vital Signs (24h Range):  Temp:  [97.1 °F (36.2 °C)-98.2 °F (36.8 °C)] 97.9 °F (36.6 °C)  Pulse:  [] 94  Resp:  [15-22] 17  SpO2:  [93 %-100 %] 93 %  BP: (101-135)/(64-77) 104/69     Weight: 82.9 kg (182 lb 12.2 oz)  Body mass index is 27.79 kg/m².    Intake/Output - Last 3 Shifts         06/25 0700 06/26 0659 06/26 0700 06/27 0659 06/27 0700 06/28 0659    P.O.  50     I.V. (mL/kg)  375.6 (4.5)     IV Piggyback  1000     Total Intake(mL/kg)  1425.6 (17.2)     Urine (mL/kg/hr)   725 (3)    Total Output   725    Net  +1425.6 -725           Unmeasured Urine Occurrence  1 x     Unmeasured Stool Occurrence  0 x              Physical Exam  Constitutional:       Appearance: She is  well-developed.   HENT:      Head: Normocephalic and atraumatic.   Eyes:      Conjunctiva/sclera: Conjunctivae normal.      Pupils: Pupils are equal, round, and reactive to light.   Neck:      Thyroid: No thyromegaly.   Cardiovascular:      Rate and Rhythm: Normal rate and regular rhythm.   Pulmonary:      Effort: Pulmonary effort is normal. No respiratory distress.   Abdominal:      General: There is no distension.      Palpations: Abdomen is soft. There is no mass.      Tenderness: There is no abdominal tenderness.   Musculoskeletal:         General: No tenderness. Normal range of motion.      Cervical back: Normal range of motion.   Skin:     General: Skin is warm and dry.      Capillary Refill: Capillary refill takes less than 2 seconds.   Neurological:      General: No focal deficit present.      Mental Status: She is alert and oriented to person, place, and time.          Significant Labs:  I have reviewed all pertinent lab results within the past 24 hours.    Significant Diagnostics:  I have reviewed all pertinent imaging results/findings within the past 24 hours.  Assessment/Plan:     * Postoperative nausea and vomiting  - IV Reglan, convert to p.o. at home  - IVF resuscitation  - advance diet and discharge when tolerating        Tressa Jackson MD  General Surgery  [facility] - Med Surg

## 2025-06-27 NOTE — SUBJECTIVE & OBJECTIVE
Interval History:  Only had ice chips overnight.  Denies nausea.  Previously her nausea was even without oral intake    Medications:  Continuous Infusions:   0.9% NaCl   Intravenous Continuous 75 mL/hr at 06/27/25 0410 Rate Verify at 06/27/25 0410     Scheduled Meds:   famotidine (PF)  20 mg Intravenous Q12H    metoclopramide  10 mg Intravenous Q6H     PRN Meds:  Current Facility-Administered Medications:     acetaminophen, 650 mg, Oral, Q8H PRN    melatonin, 6 mg, Oral, Nightly PRN    morphine, 2 mg, Intravenous, Q4H PRN    morphine, 4 mg, Intravenous, Q4H PRN    ondansetron, 4 mg, Intravenous, Q12H PRN    prochlorperazine, 5 mg, Intravenous, Q6H PRN    sodium chloride 0.9%, 10 mL, Intravenous, PRN     Review of patient's allergies indicates:   Allergen Reactions    Dilaudid [hydromorphone] Itching     From IV    Nitrofurantoin Other (See Comments)     Cramping of legs     Objective:     Vital Signs (Most Recent):  Temp: 97.9 °F (36.6 °C) (06/27/25 0745)  Pulse: 94 (06/27/25 0745)  Resp: 17 (06/27/25 0745)  BP: 104/69 (06/27/25 0745)  SpO2: (!) 93 % (06/27/25 0745) Vital Signs (24h Range):  Temp:  [97.1 °F (36.2 °C)-98.2 °F (36.8 °C)] 97.9 °F (36.6 °C)  Pulse:  [] 94  Resp:  [15-22] 17  SpO2:  [93 %-100 %] 93 %  BP: (101-135)/(64-77) 104/69     Weight: 82.9 kg (182 lb 12.2 oz)  Body mass index is 27.79 kg/m².    Intake/Output - Last 3 Shifts         06/25 0700 06/26 0659 06/26 0700 06/27 0659 06/27 0700 06/28 0659    P.O.  50     I.V. (mL/kg)  375.6 (4.5)     IV Piggyback  1000     Total Intake(mL/kg)  1425.6 (17.2)     Urine (mL/kg/hr)   725 (3)    Total Output   725    Net  +1425.6 -725           Unmeasured Urine Occurrence  1 x     Unmeasured Stool Occurrence  0 x              Physical Exam  Constitutional:       Appearance: She is well-developed.   HENT:      Head: Normocephalic and atraumatic.   Eyes:      Conjunctiva/sclera: Conjunctivae normal.      Pupils: Pupils are equal, round, and reactive to  light.   Neck:      Thyroid: No thyromegaly.   Cardiovascular:      Rate and Rhythm: Normal rate and regular rhythm.   Pulmonary:      Effort: Pulmonary effort is normal. No respiratory distress.   Abdominal:      General: There is no distension.      Palpations: Abdomen is soft. There is no mass.      Tenderness: There is no abdominal tenderness.   Musculoskeletal:         General: No tenderness. Normal range of motion.      Cervical back: Normal range of motion.   Skin:     General: Skin is warm and dry.      Capillary Refill: Capillary refill takes less than 2 seconds.   Neurological:      General: No focal deficit present.      Mental Status: She is alert and oriented to person, place, and time.          Significant Labs:  I have reviewed all pertinent lab results within the past 24 hours.    Significant Diagnostics:  I have reviewed all pertinent imaging results/findings within the past 24 hours.

## 2025-06-27 NOTE — ASSESSMENT & PLAN NOTE
- IV Reglan, convert to p.o. at home  - IVF resuscitation  - advance diet and discharge when tolerating

## 2025-06-27 NOTE — TELEPHONE ENCOUNTER
Called pt and spouse when I received a message she went to the ER. No answer from pt or her spouse. Called to touch base and let her know Dr. Lieberman is out today ut that she can call me if she needs anything [phone]

## 2025-06-27 NOTE — H&P
[facility] Surg  Colorectal Surgery  History and Physical    Patient Name: Chel Thompson  MRN: [id number]  Admission Date: 6/26/2025  Hospital Length of Stay: 0 days  Attending Physician: Tressa Jackson MD  Primary Care Provider: Yesica Burk MD      Subjective:     Chief Complaint/Reason for Admission:  Inability to tolerate p.o.    History of Present Illness:  Patient is a 64-year-old female status post Whipple procedure 05/29/2025.  She was doing well at home and then stopped taking her Reglan.  When she quit taking Reglan she had inability to tolerate oral intake with nausea and emesis.  Prior to this she was tolerating regular diet.  In the ED patient has slight acidosis likely secondary to dehydration.  CT scan similar to prior CT scan from 2 days ago with no evidence of pancreatic leak in stable perisplenic collection.     Current Facility-Administered Medications   Medication    0.9% NaCl infusion    acetaminophen tablet 650 mg    famotidine (PF) injection 20 mg    melatonin tablet 6 mg    metoclopramide injection 10 mg    morphine injection 2 mg    morphine injection 4 mg    ondansetron injection 4 mg    prochlorperazine injection Soln 5 mg    sodium chloride 0.9% flush 10 mL       Review of patient's allergies indicates:   Allergen Reactions    Dilaudid [hydromorphone] Itching     From IV    Nitrofurantoin Other (See Comments)     Cramping of legs       Past Medical History:   Diagnosis Date    Herpes genitalis in women 05/27/2025    Hypertension     Hypertensive heart disease without heart failure 02/04/2021    Intraductal papillary mucinous neoplasm of pancreas 03/04/2025    Pancreatitis     4 times (2009,2013)    UTI (urinary tract infection)      Past Surgical History:   Procedure Laterality Date    COLONOSCOPY N/A 8/22/2024    Procedure: COLONOSCOPY;  Surgeon: Kya Garcia MD;  Location: [facility];  Service: Endoscopy;  Laterality: N/A;    DILATION AND CURETTAGE OF UTERUS       ENDOSCOPIC ULTRASOUND OF UPPER GASTROINTESTINAL TRACT N/A 2/26/2025    Procedure: ULTRASOUND, UPPER GI TRACT, ENDOSCOPIC;  Surgeon: Abdi Mtz MD;  Location: [facility] ENDO;  Service: Endoscopy;  Laterality: N/A;  2/21 portal-EUS with anyone in next 2-3 weeks, either site should be ok. [id number]    MULTIPLE TOOTH EXTRACTIONS      vaginal delivery      varicose veins      WHIPPLE PROCEDURE N/A 5/29/2025    Procedure: WHIPPLE PROCEDURE;  Surgeon: Yolie Lieberman MD;  Location: [facility] OR;  Service: Oncology;  Laterality: N/A;     Family History       Problem Relation (Age of Onset)    Asthma Mother    COPD Brother    Cancer Maternal Grandmother, Maternal Grandfather    Hypertension Mother          Tobacco Use    Smoking status: Never    Smokeless tobacco: Never   Substance and Sexual Activity    Alcohol use: Not Currently     Comment: occasionally    Drug use: No    Sexual activity: Yes     Partners: Male     Objective:     Vital Signs (Most Recent):  Temp: 97.9 °F (36.6 °C) (06/27/25 0745)  Pulse: 94 (06/27/25 0745)  Resp: 17 (06/27/25 0745)  BP: 104/69 (06/27/25 0745)  SpO2: (!) 93 % (06/27/25 0745) Vital Signs (24h Range):  Temp:  [97.1 °F (36.2 °C)-98.2 °F (36.8 °C)] 97.9 °F (36.6 °C)  Pulse:  [] 94  Resp:  [15-22] 17  SpO2:  [93 %-100 %] 93 %  BP: (101-135)/(64-77) 104/69     Weight: 82.9 kg (182 lb 12.2 oz)  Body mass index is 27.79 kg/m².    Physical Exam  Constitutional:       Appearance: She is well-developed.   HENT:      Head: Normocephalic and atraumatic.   Eyes:      Conjunctiva/sclera: Conjunctivae normal.      Pupils: Pupils are equal, round, and reactive to light.   Neck:      Thyroid: No thyromegaly.   Cardiovascular:      Rate and Rhythm: Normal rate and regular rhythm.   Pulmonary:      Effort: Pulmonary effort is normal. No respiratory distress.   Abdominal:      General: There is no distension.      Palpations: Abdomen is soft. There is no mass.      Tenderness: There is no abdominal  tenderness.      Comments: Incisions clean dry and intact   Musculoskeletal:         General: No tenderness. Normal range of motion.      Cervical back: Normal range of motion.   Skin:     General: Skin is warm and dry.      Capillary Refill: Capillary refill takes less than 2 seconds.   Neurological:      General: No focal deficit present.      Mental Status: She is alert and oriented to person, place, and time.         Significant Labs:  CBC:   Recent Labs   Lab 06/27/25  0529   WBC 5.72   RBC 3.79*   HGB 11.1*   HCT 34.8*      MCV 92   MCH 29.3   MCHC 31.9*     CMP:   Recent Labs   Lab 06/27/25  0529   GLU 78   CALCIUM 8.8   ALBUMIN 3.1*   PROT 6.5      K 3.5   CO2 18*      BUN 37*   CREATININE 0.7   ALKPHOS 72   ALT 45*   AST 29   BILITOT 0.4       Significant Diagnostics:  CT: I have reviewed all pertinent results/findings within the past 24 hours:       Assessment/Plan:     Active Diagnoses:    Diagnosis Date Noted POA    PRINCIPAL PROBLEM:  Postoperative nausea and vomiting [R11.2, Z98.890] 06/27/2025 Yes    Intraductal papillary mucinous neoplasm of pancreas [D49.0] 03/04/2025 Yes      Problems Resolved During this Admission:       - admit to observation  - IVF resuscitation  - start IV Reglan  - full liquid diet and advance as tolerated.  If she can tolerate a diet can switch to oral Reglan and discharged home      Tressa Jackson MD  Colorectal Surgery  [facility]

## 2025-06-27 NOTE — TELEPHONE ENCOUNTER
Called pt to check on her @ the ED. LVM asking her to call back to let me know how she is feeling.

## 2025-06-27 NOTE — PLAN OF CARE
[facility]  Discharge Final Note    Primary Care Provider: Yesica Burk MD    Expected Discharge Date: 6/27/2025    Final Discharge Note (most recent)       Final Note - 06/27/25 1111          Final Note    Assessment Type Final Discharge Note (P)      Anticipated Discharge Disposition Home or Self Care (P)      Hospital Resources/Appts/Education Provided Appointments scheduled and added to AVS (P)         Post-Acute Status    Post-Acute Authorization Home Health (P)      Home Health Status Set-up Complete/Auth obtained (P)      Discharge Delays None known at this time (P)                      Patient discharging home with continued care with [facility]

## 2025-06-27 NOTE — PLAN OF CARE
[facility]  Initial Discharge Assessment       Primary Care Provider: Yesica Burk MD    Admission Diagnosis: Intractable nausea and vomiting [R11.2]    Admission Date: 6/26/2025  Expected Discharge Date: 6/27/2025         Payor: BLUE CROSS BLUE SHIELD / Plan: BCBS ALL OUT OF STATE / Product Type: PPO /     Extended Emergency Contact Information  Primary Emergency Contact: Shen KIRK  Address: [address] [place] [address] [place]  Work Phone: [phone]  Mobile Phone: [phone]  Relation: Spouse  Secondary Emergency Contact: MerPhillip gilbert  Address: [address]           [facility]  Home Phone: [phone]  Relation: Son              [facility] - [place], [place] - [address]  [facility]  [facility] [address]  Phone: [phone] Fax: [phone]    [id number] - [place], [place] - [address]  [address] [address]  Phone: [phone] Fax: [phone]          Readmission Assessment (most recent)       Readmission Assessment - 06/27/25 1109          Readmission    Was this a planned readmission? No (P)      Why were you readmitted? Related to previous admission (P)      When you left the hospital where did you go? Home with Home Health (P)      Did patient/caregiver refused recommended DC plan? No (P)      Did you call anyone? Yes (P)      Who did you call? PCP (P)      Did you try to see or did see a doctor or nurse before you came? Yes (P)      Did you have  a follow-up appointment on discharge? Yes (P)      Did you go? Yes (P)

## 2025-06-30 ENCOUNTER — TELEPHONE (OUTPATIENT)
Dept: FAMILY MEDICINE | Facility: CLINIC | Age: 65
End: 2025-06-30
Payer: COMMERCIAL

## 2025-06-30 ENCOUNTER — PATIENT MESSAGE (OUTPATIENT)
Dept: SURGICAL ONCOLOGY | Facility: CLINIC | Age: 65
End: 2025-06-30
Payer: COMMERCIAL

## 2025-06-30 ENCOUNTER — TELEPHONE (OUTPATIENT)
Dept: SURGICAL ONCOLOGY | Facility: CLINIC | Age: 65
End: 2025-06-30
Payer: COMMERCIAL

## 2025-06-30 NOTE — TELEPHONE ENCOUNTER
"Called pt to see how she is doing after her hospital stay. Pt is feeling much better and able to keep food down again. She states the medical leave company is saying she is missing paperwork. I told her to have them contact me. Told her to have them fax to me and add "attn: Quita" on the fax. Told her to give them all of my contact information. Pt verbalized understanding and will call them to give info.  "

## 2025-06-30 NOTE — TELEPHONE ENCOUNTER
Copied from CRM #[id number]. Topic: General Inquiry - Patient Advice  >> Jun 30, 2025 11:03 AM Quita wrote:  .1MEDICALADVICE     Patient is calling for Medical Advice regarding:Suzie with [employer] calling to speak with the nurse regarding the patient. Please call them, phone [phone]. Thanks.    How long has patient had these symptoms: N/A    Pharmacy name and phone#: N/A    Patient wants a call back or thru [facility], provide patient's call back phone number: N/A    Comments: N/A    Please advise patient replies from provider may take up to 48 hours.    Left VM.

## 2025-06-30 NOTE — DISCHARGE SUMMARY
[facility]  Discharge Summary      Patient Name: Chel Thompson  MRN: [id number]  Admission Date: 6/26/2025  Hospital Length of Stay: 0 days  Discharge Date and Time: 6/27/2025  2:23 PM  Attending Physician: No att. providers found    Discharging Provider: Tressa Jackson MD  Primary Care Physician: Yesica Burk MD    HPI:   No notes on file    * No surgery found *     Indwelling Lines/Drains at time of discharge:  Lines/Drains/Airways       None                   Hospital Course:  No notes on file    Consults:     Significant Diagnostic Studies: N/A    Pending Diagnostic Studies:       None            Final Active Diagnoses:    Diagnosis Date Noted POA    PRINCIPAL PROBLEM:  Postoperative nausea and vomiting [R11.2, Z98.890] 06/27/2025 Yes    Intraductal papillary mucinous neoplasm of pancreas [D49.0] 03/04/2025 Yes      Problems Resolved During this Admission:     No new Assessment & Plan notes have been filed under this hospital service since the last note was generated.  Service: General Surgery      Discharged Condition: good    Disposition: Home or Self Care    Follow Up:    Patient Instructions:      Diet Adult Regular     Notify your health care provider if you experience any of the following:  temperature >100.4     Notify your health care provider if you experience any of the following:  persistent nausea and vomiting or diarrhea     Notify your health care provider if you experience any of the following:  severe uncontrolled pain     Notify your health care provider if you experience any of the following:   Order Comments: Worsening nausea, vomiting inability to tolerate p.o.     No dressing needed     Activity as tolerated     Medications:  Reconciled Home Medications:      Medication List        START taking these medications      metoclopramide HCl 10 MG tablet  Commonly known as: REGLAN  Take 1 tablet (10 mg total) by mouth 4 (four) times daily before meals and nightly.             CONTINUE taking these medications      oxyCODONE 5 MG immediate release tablet  Commonly known as: ROXICODONE  Take 1 tablet (5 mg total) by mouth every 4 (four) hours as needed for Pain.     valsartan-hydrochlorothiazide 160-12.5 mg per tablet  Commonly known as: DIOVAN-HCT  Take 1 tablet by mouth every morning.              Time spent on the discharge of patient: 20 minutes    Tressa Jackson MD  Cardiac Electrophysiology  [facility]

## 2025-07-01 ENCOUNTER — TELEPHONE (OUTPATIENT)
Dept: SURGICAL ONCOLOGY | Facility: CLINIC | Age: 65
End: 2025-07-01
Payer: COMMERCIAL

## 2025-07-01 NOTE — TELEPHONE ENCOUNTER
Spoke with patient & denicet. Paperwork to be faxed to [phone]    ----- Message from Carmelo Devlin sent at 7/1/2025 11:01 AM CDT -----  Regarding: Asking for an amendment to original paperwork  Liana Ibarra. Will you please reach out to her? She is asking for an amendment to the original paperwork to extend her leave.  She needs it extended from 10th to 23rd. She said we have the form. She said it is the form that has blue trim and it is asking for dates to return to work.

## 2025-07-02 ENCOUNTER — TELEPHONE (OUTPATIENT)
Dept: SURGICAL ONCOLOGY | Facility: CLINIC | Age: 65
End: 2025-07-02
Payer: COMMERCIAL

## 2025-07-02 NOTE — TELEPHONE ENCOUNTER
"Called pt to check on her after sx/ED visit. Pt states she is having headaches every day/every other day that are 2/10 pain level. She thinks it is continued dehydration.  Pt states they only last minutes because she takes an over the counter pain reliever as soon as she feels it. Asked about her liquid/food intake. Pt states she cannot drink the protein shakes due to their taste. However, her sister has been making her shakes and adding protein powder. She has been eating some solid food. Today: [place] steak, corn and green beans. Pt states she had [employer] yesterday ("burger/it was burnt and gross").  She has been drinking Sprite, Coke, Iced-T and orange juice. I explained that the caffeine will most likely add to her dehydration and to swap them for Powerade/Gatorade/liquid IV or any other hydrating drink and water. I told her to exchange her fast food for healthier selections like rice, veggies and lean/lighter meats. Pt verbalized understanding of all things discussed and repeated information back to me. I told her to call her nutritionist if she requires more detailed instructions. Pt states she will call if anything changes.   "

## 2025-07-07 ENCOUNTER — TELEPHONE (OUTPATIENT)
Dept: INTERNAL MEDICINE | Facility: CLINIC | Age: 65
End: 2025-07-07
Payer: COMMERCIAL

## 2025-07-07 NOTE — TELEPHONE ENCOUNTER
Copied from CRM #[id number]. Topic: General Inquiry - Patient Advice  >> Jul 7, 2025 10:01 AM Chrissy wrote:  Who Called: Blue cross blue shield     What is the request in detail: Requesting call back to discuss opportunities and available resources. Please advise    Can the clinic reply by [facility]? No    Best Call Back Number: [phone]    Additional Information:

## 2025-07-15 ENCOUNTER — TELEPHONE (OUTPATIENT)
Dept: SURGICAL ONCOLOGY | Facility: CLINIC | Age: 65
End: 2025-07-15
Payer: COMMERCIAL

## 2025-07-15 NOTE — TELEPHONE ENCOUNTER
Lvm asking pt to call me back.  I moved her appt to tomorrow @ 4pm. Asked her to call to let me know if she can make this appt. Gave call back number.

## 2025-07-15 NOTE — TELEPHONE ENCOUNTER
Pt called me back per message request. Gave her time date and location of tomorrow's appt. She is going to call back to reschedule again if they schedule her  to work tomorrow.    I asked her to go over her food consumption for the week. Pt states: she ate 3 to 4 little meals on 07/12/2025.  States: 07/13 [place] broccoli & mashed potatoes  States: 07/14 chicken spaghetti w/bread  States: today chocolate milk only    Pt states she has not been able to hold anything down without nausea medicine. She states keeping food down is becoming more and more difficult as time passes since her surgery. She c/o weakness and difficulty getting around to appts. She said she knows it is from not being able to eat enough.   I encouraged her to come to her appt tomorrow @ 4 so we can get a new baseline. Pt agreed and will try to get a solid ride for tomorrow's appt. Pt verbalized understanding of all things discussed today .

## 2025-07-16 ENCOUNTER — TELEPHONE (OUTPATIENT)
Dept: SURGICAL ONCOLOGY | Facility: CLINIC | Age: 65
End: 2025-07-16
Payer: COMMERCIAL

## 2025-07-16 NOTE — TELEPHONE ENCOUNTER
Pt called to let me know she does not have a ride tot he appt scheduled today. Pt rescheduled to 07/22/2025. Gave time date and location of new appt. Pt verbalized understanding and repeated info back correctly

## 2025-07-16 NOTE — TELEPHONE ENCOUNTER
Lvm asking pt to call to let us know if she will be coming today and to ask if she can come in earlier. Gave good call back number.

## 2025-07-22 ENCOUNTER — TELEPHONE (OUTPATIENT)
Dept: SURGICAL ONCOLOGY | Facility: CLINIC | Age: 65
End: 2025-07-22
Payer: COMMERCIAL

## 2025-07-22 NOTE — TELEPHONE ENCOUNTER
Called pt to let her know provider was called in to emerg sx. Moved appt with pt. Pt verbalized understanding of all things discussed including time date and location of new appt.

## 2025-07-29 ENCOUNTER — OFFICE VISIT (OUTPATIENT)
Dept: SURGICAL ONCOLOGY | Facility: CLINIC | Age: 65
End: 2025-07-29
Payer: COMMERCIAL

## 2025-07-29 VITALS
HEART RATE: 124 BPM | TEMPERATURE: 98 F | BODY MASS INDEX: 26.18 KG/M2 | OXYGEN SATURATION: 97 % | SYSTOLIC BLOOD PRESSURE: 109 MMHG | DIASTOLIC BLOOD PRESSURE: 79 MMHG | HEIGHT: 68 IN | WEIGHT: 172.75 LBS

## 2025-07-29 DIAGNOSIS — Z90.410 H/O WHIPPLE PROCEDURE: Primary | ICD-10-CM

## 2025-07-29 DIAGNOSIS — D49.0 IPMN (INTRADUCTAL PAPILLARY MUCINOUS NEOPLASM): ICD-10-CM

## 2025-07-29 DIAGNOSIS — K86.81 EXOCRINE PANCREATIC INSUFFICIENCY: ICD-10-CM

## 2025-07-29 DIAGNOSIS — K31.84 GASTROPARESIS: ICD-10-CM

## 2025-07-29 DIAGNOSIS — Z90.49 H/O WHIPPLE PROCEDURE: Primary | ICD-10-CM

## 2025-07-29 DIAGNOSIS — R53.81 DEBILITY: ICD-10-CM

## 2025-07-29 PROCEDURE — 99024 POSTOP FOLLOW-UP VISIT: CPT | Mod: S$GLB,,, | Performed by: SURGERY

## 2025-07-29 PROCEDURE — 99999 PR PBB SHADOW E&M-EST. PATIENT-LVL IV: CPT | Mod: PBBFAC,,, | Performed by: SURGERY

## 2025-07-29 PROCEDURE — 3078F DIAST BP <80 MM HG: CPT | Mod: CPTII,S$GLB,, | Performed by: SURGERY

## 2025-07-29 PROCEDURE — 3074F SYST BP LT 130 MM HG: CPT | Mod: CPTII,S$GLB,, | Performed by: SURGERY

## 2025-07-29 RX ORDER — FAMOTIDINE 20 MG/1
20 TABLET, FILM COATED ORAL 2 TIMES DAILY
Qty: 60 TABLET | Refills: 11 | Status: SHIPPED | OUTPATIENT
Start: 2025-07-29 | End: 2026-07-29

## 2025-07-29 RX ORDER — DIPHENHYDRAMINE HCL 12.5MG/5ML
12.5 ELIXIR ORAL 4 TIMES DAILY PRN
Qty: 118 ML | Refills: 0 | Status: SHIPPED | OUTPATIENT
Start: 2025-07-29

## 2025-07-29 NOTE — PATIENT INSTRUCTIONS
-- only liquids until Monday  -- increase protein shakes to 6/ day  -- continue reglan  -- start pepcid BID   -- start benadryl 12.5mg every 6-8 hours as needed for nausea   -- getting gastric emptying study   -- PT referral -you MUST start walking more  -- checking fecal elastase to see if have enough pancreatic enzymes- if not, may need to start taking creon

## 2025-07-29 NOTE — PROGRESS NOTES
Surgical Oncology Clinic Note      Referring Provider: Dr. Abdi Mtz   PCP: Yesica Burk MD    Reason For Visit: Pancreas: cyst (IPMN)    HISTORY OF PRESENT ILLNESS     Chel Thompson is a 64 y.o. female with history of HTN who presents today for evaluation and management of main duct- IPMN.      She has a remote history of pancreatitis with 3 bouts of pancreatitis back in 2010.  She states that she was not drinking at that time and that nobody was ever able to tell her why she was having pancreatitis.  It sounds like it was shocked up has been idiopathic.  Then about 3 years later in 2013 she states she was on the way to work and suddenly had sudden onset of abdominal/chest pain was taken to the hospital and an extensive workup was done which ultimately revealed an additional episode of pancreatitis.  Around that time she had imaging done in the hospital either a CT or an MRI however is uncertain what it showed.  That was done at [facility].  She unfortunately does not have any records of that either.  Following that bout of pancreatitis she has never had any additional episodes or issues.    She was in her usual state of good health up until a few months ago when she started having right lower extremity pain for which she went to the [facility] in [place].  MRI of the lumbar spine was ordered which showed an evidence of degenerative changes but also showed what was concerning for a multilocular cystic mass in the head of the pancreas measuring approximately 4.4 cm which was incompletely characterized.  A subsequent MRI/MRCP of the abdomen was ordered and done on 11/26/2024.  This showed dilation of the main pancreatic duct in the pancreatic head and proximal body measuring up to 1.9 cm, increased compared to 2013.  There was also non masslike enhancement of the pancreatic head.  She was then referred to GI who she saw on 02/11/2025.  They referred her to  advanced endoscopy in [place] for an EUS.  That was done on 02/26/2025 by Dr. Mtz and revealed gaping ampulla with visible extrusion of mucin as well as dilated pancreatic duct in the pancreatic head in January of the pancreas measuring up to 20 mm in diameter, with tapering of and of the ductal dilatation in the body/tail.  There were no intraductal nodule seen on EUS in no pancreatic mass seen.    She is otherwise healthy.  She is a nonsmoker.  She drinks alcohol on rare occasions.  Family history is significant for prostate cancer in her brother as well as lung cancer in her maternal grandfather.  Otherwise no significant family history aside from what is noted below    Brother- prostate cancer, CAD   Materna grandfather=- lung cancer  Maternal grandmother- tumor in the eye  Maternal family is from [place] so limited information on them     INTERVAL HISTORY     04/15/2025:  She is doing well.  She has had no issues since our last visit.  Her brother successfully completed his operation and has been recovering well.  She presents to discuss the next steps of surgical planning.      5/27/2025:  doing well.  No concerns at this time.      6/24/2025:  Here for post op appointment- was discharged on POD #7 after KEVIN drain removal.  Imaging prior to d/c negative for undrained collection and amylase low.  She was readmitted 2 weeks later for purulent drainage from KEVIN drain site- there was no subcutaneous collection, there was a serous collection near the stomach which was aspirated by IR and was serous, amylase was 8.  She was discharged home after 2 days and was doing well until about 4 days ago when she started feeling really poorly.  Per her and her  she has had decreased appetite and has been eating little an feeling more and more weak.  She denies fevers, chills, nausea, and vomiting.  She is having some diarrhea but denies any greasy stools or cramping.  She presents in a wheelchair today.       07/29/2025:    CT scan done last time was negative for any acute issues, there was no fluid collections or evidence of abscess or leak.  She returns for follow up.  She has lost another 6 lb in the last month.  She is still complaining of belching, intermittent nausea throughout the day, as well as change in taste and texture for food.  Regarding her nausea she states she wakes up in the mornings and feels nauseated but does continued to improve throughout the day.  She is still taking her Reglan 3 to 4 times a day and thinks that helps.  She will belch hook.  She is eating healthy choice meals, 2 protein shakes a day, and some watermelon.  She denies any pain with eating or cramping immediately after eating.  She does note that her stools are light yellow in color.  Functionally she looks much better than the last time she was here but she is still not walking much or being very active.        Past Medical History:   Diagnosis Date    Herpes genitalis in women 05/27/2025    Hypertension     Hypertensive heart disease without heart failure 02/04/2021    Intraductal papillary mucinous neoplasm of pancreas 03/04/2025    Pancreatitis     4 times (2009,2013)    UTI (urinary tract infection)        Past Surgical History:   Procedure Laterality Date    COLONOSCOPY N/A 8/22/2024    Procedure: COLONOSCOPY;  Surgeon: Kya Garcia MD;  Location: [facility] ENDO;  Service: Endoscopy;  Laterality: N/A;    DILATION AND CURETTAGE OF UTERUS      ENDOSCOPIC ULTRASOUND OF UPPER GASTROINTESTINAL TRACT N/A 2/26/2025    Procedure: ULTRASOUND, UPPER GI TRACT, ENDOSCOPIC;  Surgeon: Abdi Mtz MD;  [facility] ENDO;  Service: Endoscopy;  Laterality: N/A;  2/21 portal-EUS with anyone in next 2-3 weeks, either site should be ok. [id number]    MULTIPLE TOOTH EXTRACTIONS      vaginal delivery      varicose veins      WHIPPLE PROCEDURE N/A 5/29/2025    Procedure: WHIPPLE PROCEDURE;  Surgeon: Yolie Lieberman MD;  Location: [facility] OR;   Service: Oncology;  Laterality: N/A;       Family History   Problem Relation Name Age of Onset    Asthma Mother      Hypertension Mother      COPD Brother 3     Cancer Maternal Grandmother      Cancer Maternal Grandfather         Social History[1]       Medication List            Accurate as of July 29, 2025  4:13 PM. If you have any questions, ask your nurse or doctor.                CONTINUE taking these medications      metoclopramide HCl 10 MG tablet  Commonly known as: REGLAN  Take 1 tablet (10 mg total) by mouth 4 (four) times daily before meals and nightly.     oxyCODONE 5 MG immediate release tablet  Commonly known as: ROXICODONE  Take 1 tablet (5 mg total) by mouth every 4 (four) hours as needed for Pain.     valsartan-hydrochlorothiazide 160-12.5 mg per tablet  Commonly known as: DIOVAN-HCT              Review of patient's allergies indicates:   Allergen Reactions    Dilaudid [hydromorphone] Itching     From IV    Nitrofurantoin Other (See Comments)     Cramping of legs       ROS      PHYSICAL EXAM     Vitals:    07/29/25 1605   BP: 109/79   Pulse: (!) 124   Temp: 97.7 °F (36.5 °C)       Body mass index is 26.26 kg/m².  ECOG SCORE             Physical Exam   Thinner, appears weak, multiple bruises on her arms, midline incision well healed     DATA REVIEW:  I personally reviewed the following records for this visit: lab work from prior visit, notes from other physicians, magnetic resonance/MR imaging, surgical pathology results, endoscopy results, radiographic study evaluation, and laboratory results done by primary care physician    LABS       Lab Results   Component Value Date    WBC 5.72 06/27/2025    HGB 11.1 (L) 06/27/2025    HCT 34.8 (L) 06/27/2025     06/27/2025     Lab Results   Component Value Date    GLU 78 06/27/2025    CALCIUM 8.8 06/27/2025    ALBUMIN 3.1 (L) 06/27/2025    PROT 6.5 06/27/2025     06/27/2025    K 3.5 06/27/2025    CO2 18 (L) 06/27/2025     06/27/2025    BUN  "37 (H) 06/27/2025    CREATININE 0.7 06/27/2025    ALKPHOS 72 06/27/2025    ALT 45 (H) 06/27/2025    AST 29 06/27/2025    BILITOT 0.4 06/27/2025       Nutritional:  No results found for: "PREALBUMIN"    Tumor Markers:  Lab Results   Component Value Date    AMYLASE 269 (H) 05/30/2025     No results found for: ""    PATHOLOGY     05/29/2025:  Whipple  Final Diagnosis   1. Hernia sac, excision:  Fibroadipose tissue with congestion.     2. Hepatic artery lymph node, lymphadenectomy:  Three lymph nodes, negative for carcinoma (0/3).     3. Gallbladder, cholecystectomy:  Gallbladder with minimal chronic cholecystitis.  One lymph node, negative for carcinoma (0/1).     4. Pancreatoduodenectomy:  Intraductal papillary mucinous neoplasm (IPMN), measuring 4.2 cm in greatest dimension, with extensive high-grade dysplasia.  Surgical margins are negative for dysplasia.  Negative for invasive carcinoma.  Thirty lymph nodes, negative for carcinoma (0/30).  Uninvolved pancreas with atrophy.  Uninvolved stomach and duodenum with no diagnostic abnormality.     5. Omentum, resection:  Fibroadipose tissue, with no diagnostic abnormality.     6. Stomach, resection:  Segment of stomach, lined by oxyntic mucosa, with no diagnostic abnormality.  One lymph node, negative for carcinoma (0/1).          IMAGING     11/19/2024:  MRI Lumbar Spine  Impression:  1. Multilevel degenerative changes of the lumbar spine, as detailed above, resulting in mild neural foraminal and spinal canal stenosis at L2-3 through L4-5.  2. Multilocular cystic mass at the head of the pancreas, measuring on the order of 4.1 cm, incompletely characterized.  Recommend further evaluation with MRI/MRCP with and without contrast.  This report was flagged in Epic as abnormal.    11/26/2024:  MRI/ MRCP Abdomen  Impression:  1. Dilatation main pancreatic duct which appears increased to 2013.  Differential considerations include upstream dilatation the setting of scarring " prior episodes of pancreatitis and main branch IPMN.  Recommend GI consultation and consideration for ERCP.  2. Hepatomegaly and hepatic steatosis.    02/26/2025:  EUS (Dr. Mtz)  No sign of significant and a sonographic abnormality in the common bile duct.  The pancreatic duct had a dilated and a sonographic parents in the pancreatic head in January of the pancreas.  The duct measured up to 20 mm in diameter, with tapering of the ductal dilatation in the body/tail.  There were no intraductal nodule seen on EUS.  No pancreatic mass was seen.  A gaping ampulla with visible extrusion of mucin was seen intra procedurally.    06/10/2025:  CT Abdomen/ Pelvis with IV contrast  Impression:  1. Interval development of a fluid collection in the hepatic hilum extending into the retroperitoneum.  Given homogeneous appearance findings may suggest biloma versus abscess less likely seroma.  2. Continued evolution of the previously seen splenic infarct.  3. Interval removal of the surgical drains in this patient status post Whipple.    06/25/2025:  CT Abdomen/ Pelvis with IV Contrast  Impression:  1.  Interval decrease without complete resolution of fluid collection along the medial liver.  Stable parasplenic fluid collection.  Near complete resolution of bilateral basilar and lingular atelectasis.  2. Negative for acute process otherwise.  Normal appendix.  Negative for renal stone disease or hydronephrosis.  Negative for free air.  Negative for inflammatory changes.  3.  Numerous stable findings as noted above.    ASSESSMENT & PLAN     1. H/O Whipple procedure    2. Gastroparesis    3. Debility    4. Exocrine pancreatic insufficiency    5. IPMN (intraductal papillary mucinous neoplasm)         Chel Thompson is a 64 y.o. female with  main duct IPMN s/p whipple on 05/29/2025.    She is doing better than she was at her last visit however remains fatigued with weight loss and overall has not progressed as I would  expect.  Her imaging done last time did not reveal any undrained fluid collections or evidence of a pancreatic leak.  She continues to complain of nausea, she states it is worse in the morning right when she wakes up.  I suspect there is 1 if not many different things going on here.  I discussed with her again that she is certainly high-risk for gastroparesis following Whipple.  I reviewed with her the importance of avoiding spicy and fatty foods, as well as foods rich in insoluble fiber.  I discussed with her that even patients with gastroparesis tender mostly have that issue with solid foods and that liquids 10 to leave the stomach readily even with some component of gastroparesis.  I have not yet again encouraged her to avoid solid foods for right now and to just take some liquids throughout the day and focusing on high-calorie, high-protein shakes.  I have encouraged her to drink anywhere from 5-6 of these a day to see if her symptoms improve.  In addition I have instructed her to not lay down a reclined for at least 2 hours after eating or drinking to try to avoid some of the reflux.  I have also prescribed her some Benadryl as there is some evidence that this can help with gastroparesis induced nausea/vomiting.    Additional differential diagnosis includes bile reflux which with a B2 anastomosis is certainly a possibility.  And went to see if she improves with just liquids as well as the Pepcid and we will see what the gastric emptying study shows.  If all of that is normal and she is still having nausea and reflux bile reflux certainly needs to be considered.  In addition she is complaining of some changes in her stools.  Although she declines any acute GI pains after eating, exocrine insufficiency certainly needs to be considered.    I have also had a lengthy discussion with her again today about the importance of activity and trying to maintain her mobility.  She has already been off of work for 8 weeks and  although she looks better than she did last time she still is not progressing as expected.  I have instructed her to go to the mall or to any other indoor large area where she can walk around and take breaks as needed.  In addition I have placed a referral for physical therapy to see if we can improve her functionality because she is having some significant debility after being in bed and decreased mobility for this long.     -- gastric emptying study   -- benadryl 12.5mg for nausea every 6-8 hrs   -- pepcid BID  -- PT for debility   -- fecal elastase to check for exocrine insufficiency  -- will need repeat MRI every 6 mo for the first 2 years, followed by yearly surveillance- based on the  evidence-based guidelines on pancreatic cystic neoplasms (EURO)    Ms. Thompson expressed understanding in regards to our discussion today. Many good questions were asked on today's visit, all of which were answered to their satisfaction.    Follow-up: Follow up in about 2 weeks (around 8/12/2025).                  Yolie Lieberman MD, MS, FSSO  Board Certified Surgical Oncologist   [facility]- [place], [place]  Office: [phone]            Orders Placed This Encounter   Procedures    NM Gastric Emptying     Standing Status:   Future     Expected Date:   7/29/2025     Expiration Date:   7/29/2026     May the Radiologist modify the order per protocol to meet the clinical needs of the patient?:   Yes     Release to patient:   Immediate    Pancreatic elastase, fecal     Standing Status:   Future     Expected Date:   7/29/2025     Expiration Date:   9/27/2026     Send normal result to authorizing provider's In Basket if patient is active on MyChart::   Yes    Ambulatory Referral/Consult to Physical Therapy     Standing Status:   Future     Expected Date:   8/5/2025     Expiration Date:   8/29/2026     Referral Priority:   Routine     Referral Type:   Physical Therapy     Referral Reason:    Specialty Services Required     Number of Visits Requested:   1                     [1]   Social History  Socioeconomic History    Marital status:      Spouse name: Shen Thompson    Number of children: 1   Tobacco Use    Smoking status: Never    Smokeless tobacco: Never   Substance and Sexual Activity    Alcohol use: Not Currently     Comment: occasionally    Drug use: No    Sexual activity: Yes     Partners: Male     Social Drivers of Health     Financial Resource Strain: Low Risk  (6/27/2025)    Overall Financial Resource Strain (CARDIA)     Difficulty of Paying Living Expenses: Not very hard   Food Insecurity: No Food Insecurity (6/27/2025)    Hunger Vital Sign     Worried About Running Out of Food in the Last Year: Never true     Ran Out of Food in the Last Year: Never true   Transportation Needs: No Transportation Needs (6/27/2025)    PRAPARE - Transportation     Lack of Transportation (Medical): No     Lack of Transportation (Non-Medical): No   Physical Activity: Sufficiently Active (8/6/2024)    Exercise Vital Sign     Days of Exercise per Week: 5 days     Minutes of Exercise per Session: 30 min   Stress: No Stress Concern Present (6/27/2025)    [employer] - Occupational Stress Questionnaire     Feeling of Stress : Not at all   Housing Stability: Low Risk  (6/27/2025)    Housing Stability Vital Sign     Unable to Pay for Housing in the Last Year: No     Homeless in the Last Year: No

## 2025-07-30 ENCOUNTER — PATIENT MESSAGE (OUTPATIENT)
Dept: SURGICAL ONCOLOGY | Facility: CLINIC | Age: 65
End: 2025-07-30
Payer: COMMERCIAL

## 2025-08-07 ENCOUNTER — DOCUMENT SCAN (OUTPATIENT)
Dept: HOME HEALTH SERVICES | Facility: HOSPITAL | Age: 65
End: 2025-08-07
Payer: COMMERCIAL

## 2025-08-11 ENCOUNTER — TELEPHONE (OUTPATIENT)
Dept: SURGICAL ONCOLOGY | Facility: CLINIC | Age: 65
End: 2025-08-11
Payer: COMMERCIAL

## 2025-08-12 ENCOUNTER — OFFICE VISIT (OUTPATIENT)
Dept: SURGICAL ONCOLOGY | Facility: CLINIC | Age: 65
End: 2025-08-12
Payer: COMMERCIAL

## 2025-08-12 ENCOUNTER — LAB VISIT (OUTPATIENT)
Dept: LAB | Facility: HOSPITAL | Age: 65
End: 2025-08-12
Attending: STUDENT IN AN ORGANIZED HEALTH CARE EDUCATION/TRAINING PROGRAM
Payer: COMMERCIAL

## 2025-08-12 VITALS
TEMPERATURE: 98 F | HEIGHT: 68 IN | HEART RATE: 109 BPM | DIASTOLIC BLOOD PRESSURE: 75 MMHG | WEIGHT: 169.06 LBS | BODY MASS INDEX: 25.62 KG/M2 | OXYGEN SATURATION: 96 % | SYSTOLIC BLOOD PRESSURE: 97 MMHG

## 2025-08-12 DIAGNOSIS — R43.2 DYSGEUSIA: Primary | ICD-10-CM

## 2025-08-12 DIAGNOSIS — Z00.00 WELLNESS EXAMINATION: ICD-10-CM

## 2025-08-12 DIAGNOSIS — R63.4 WEIGHT LOSS: ICD-10-CM

## 2025-08-12 LAB
ABSOLUTE EOSINOPHIL (OHS): 0.2 K/UL
ABSOLUTE MONOCYTE (OHS): 0.6 K/UL (ref 0.3–1)
ABSOLUTE NEUTROPHIL COUNT (OHS): 3.73 K/UL (ref 1.8–7.7)
ALBUMIN SERPL BCP-MCNC: 4 G/DL (ref 3.5–5.2)
ALP SERPL-CCNC: 77 UNIT/L (ref 40–150)
ALT SERPL W/O P-5'-P-CCNC: 44 UNIT/L (ref 0–55)
ANION GAP (OHS): 14 MMOL/L (ref 8–16)
AST SERPL-CCNC: 36 UNIT/L (ref 0–50)
BASOPHILS # BLD AUTO: 0.04 K/UL
BASOPHILS NFR BLD AUTO: 0.6 %
BILIRUB SERPL-MCNC: 0.6 MG/DL (ref 0.1–1)
BUN SERPL-MCNC: 16 MG/DL (ref 8–23)
CALCIUM SERPL-MCNC: 9.5 MG/DL (ref 8.7–10.5)
CHLORIDE SERPL-SCNC: 101 MMOL/L (ref 95–110)
CHOLEST SERPL-MCNC: 126 MG/DL (ref 120–199)
CHOLEST/HDLC SERPL: 4.1 {RATIO} (ref 2–5)
CO2 SERPL-SCNC: 25 MMOL/L (ref 23–29)
CREAT SERPL-MCNC: 0.8 MG/DL (ref 0.5–1.4)
EAG (OHS): 94 MG/DL (ref 68–131)
ERYTHROCYTE [DISTWIDTH] IN BLOOD BY AUTOMATED COUNT: 14.2 % (ref 11.5–14.5)
GFR SERPLBLD CREATININE-BSD FMLA CKD-EPI: >60 ML/MIN/1.73/M2
GLUCOSE SERPL-MCNC: 107 MG/DL (ref 70–110)
HBA1C MFR BLD: 4.9 % (ref 4–5.6)
HCT VFR BLD AUTO: 40.8 % (ref 37–48.5)
HDLC SERPL-MCNC: 31 MG/DL (ref 40–75)
HDLC SERPL: 24.6 % (ref 20–50)
HGB BLD-MCNC: 13.2 GM/DL (ref 12–16)
IMM GRANULOCYTES # BLD AUTO: 0.03 K/UL (ref 0–0.04)
IMM GRANULOCYTES NFR BLD AUTO: 0.4 % (ref 0–0.5)
LDLC SERPL CALC-MCNC: 65.6 MG/DL (ref 63–159)
LYMPHOCYTES # BLD AUTO: 2.48 K/UL (ref 1–4.8)
MCH RBC QN AUTO: 29 PG (ref 27–31)
MCHC RBC AUTO-ENTMCNC: 32.4 G/DL (ref 32–36)
MCV RBC AUTO: 90 FL (ref 82–98)
NONHDLC SERPL-MCNC: 95 MG/DL
NUCLEATED RBC (/100WBC) (OHS): 0 /100 WBC
PLATELET # BLD AUTO: 406 K/UL (ref 150–450)
PMV BLD AUTO: 9.7 FL (ref 9.2–12.9)
POTASSIUM SERPL-SCNC: 2.9 MMOL/L (ref 3.5–5.1)
PROT SERPL-MCNC: 7.5 GM/DL (ref 6–8.4)
RBC # BLD AUTO: 4.55 M/UL (ref 4–5.4)
RELATIVE EOSINOPHIL (OHS): 2.8 %
RELATIVE LYMPHOCYTE (OHS): 35 % (ref 18–48)
RELATIVE MONOCYTE (OHS): 8.5 % (ref 4–15)
RELATIVE NEUTROPHIL (OHS): 52.7 % (ref 38–73)
SODIUM SERPL-SCNC: 140 MMOL/L (ref 136–145)
TRIGL SERPL-MCNC: 147 MG/DL (ref 30–150)
TSH SERPL-ACNC: 2.51 UIU/ML (ref 0.4–4)
WBC # BLD AUTO: 7.08 K/UL (ref 3.9–12.7)

## 2025-08-12 PROCEDURE — 99024 POSTOP FOLLOW-UP VISIT: CPT | Mod: S$GLB,,, | Performed by: SURGERY

## 2025-08-12 PROCEDURE — 3078F DIAST BP <80 MM HG: CPT | Mod: CPTII,S$GLB,, | Performed by: SURGERY

## 2025-08-12 PROCEDURE — 3074F SYST BP LT 130 MM HG: CPT | Mod: CPTII,S$GLB,, | Performed by: SURGERY

## 2025-08-12 PROCEDURE — 83036 HEMOGLOBIN GLYCOSYLATED A1C: CPT

## 2025-08-12 PROCEDURE — 99999 PR PBB SHADOW E&M-EST. PATIENT-LVL III: CPT | Mod: PBBFAC,,, | Performed by: SURGERY

## 2025-08-12 PROCEDURE — 84443 ASSAY THYROID STIM HORMONE: CPT

## 2025-08-12 PROCEDURE — 1159F MED LIST DOCD IN RCRD: CPT | Mod: CPTII,S$GLB,, | Performed by: SURGERY

## 2025-08-12 PROCEDURE — 36415 COLL VENOUS BLD VENIPUNCTURE: CPT | Mod: PN

## 2025-08-12 PROCEDURE — 85025 COMPLETE CBC W/AUTO DIFF WBC: CPT

## 2025-08-12 PROCEDURE — 80061 LIPID PANEL: CPT

## 2025-08-12 PROCEDURE — 80053 COMPREHEN METABOLIC PANEL: CPT

## 2025-08-13 ENCOUNTER — TELEPHONE (OUTPATIENT)
Dept: SURGICAL ONCOLOGY | Facility: CLINIC | Age: 65
End: 2025-08-13
Payer: COMMERCIAL

## 2025-08-15 ENCOUNTER — PATIENT MESSAGE (OUTPATIENT)
Dept: NUTRITION | Facility: CLINIC | Age: 65
End: 2025-08-15
Payer: COMMERCIAL

## 2025-08-25 ENCOUNTER — EXTERNAL HOME HEALTH (OUTPATIENT)
Dept: HOME HEALTH SERVICES | Facility: HOSPITAL | Age: 65
End: 2025-08-25
Payer: COMMERCIAL

## 2025-08-27 ENCOUNTER — LAB VISIT (OUTPATIENT)
Dept: LAB | Facility: HOSPITAL | Age: 65
End: 2025-08-27
Attending: SURGERY
Payer: COMMERCIAL

## 2025-08-27 DIAGNOSIS — K86.81 EXOCRINE PANCREATIC INSUFFICIENCY: ICD-10-CM

## 2025-08-27 PROCEDURE — 82653 EL-1 FECAL QUANTITATIVE: CPT

## 2025-08-28 ENCOUNTER — PATIENT MESSAGE (OUTPATIENT)
Dept: NUTRITION | Facility: CLINIC | Age: 65
End: 2025-08-28
Payer: COMMERCIAL

## 2025-08-28 LAB
ELASTASE, STOOL INTERPRETATION (OHS): ABNORMAL
PANCREATIC ELASTASE, FECAL (OHS): 10.9 ΜG/G

## 2025-08-29 ENCOUNTER — CLINICAL SUPPORT (OUTPATIENT)
Dept: REHABILITATION | Facility: HOSPITAL | Age: 65
End: 2025-08-29
Attending: SURGERY
Payer: COMMERCIAL

## 2025-08-29 DIAGNOSIS — R29.898 LEG WEAKNESS, BILATERAL: Primary | ICD-10-CM

## 2025-08-29 DIAGNOSIS — R26.9 GAIT DIFFICULTY: ICD-10-CM

## 2025-08-29 PROBLEM — R53.1 WEAKNESS: Status: RESOLVED | Noted: 2024-09-30 | Resolved: 2025-08-29

## 2025-08-29 PROBLEM — M25.69 DECREASED RANGE OF MOTION OF TRUNK AND BACK: Status: RESOLVED | Noted: 2024-09-30 | Resolved: 2025-08-29

## 2025-08-29 PROCEDURE — 97112 NEUROMUSCULAR REEDUCATION: CPT | Mod: PN

## 2025-08-29 PROCEDURE — 97161 PT EVAL LOW COMPLEX 20 MIN: CPT | Mod: PN

## 2025-09-02 ENCOUNTER — OFFICE VISIT (OUTPATIENT)
Dept: SURGICAL ONCOLOGY | Facility: CLINIC | Age: 65
End: 2025-09-02
Payer: COMMERCIAL

## 2025-09-02 VITALS
HEART RATE: 124 BPM | BODY MASS INDEX: 25.39 KG/M2 | HEIGHT: 68 IN | OXYGEN SATURATION: 95 % | TEMPERATURE: 98 F | WEIGHT: 167.56 LBS | DIASTOLIC BLOOD PRESSURE: 77 MMHG | SYSTOLIC BLOOD PRESSURE: 108 MMHG

## 2025-09-02 DIAGNOSIS — Z90.410 H/O WHIPPLE PROCEDURE: ICD-10-CM

## 2025-09-02 DIAGNOSIS — R53.81 DEBILITY: ICD-10-CM

## 2025-09-02 DIAGNOSIS — Z90.49 H/O WHIPPLE PROCEDURE: ICD-10-CM

## 2025-09-02 DIAGNOSIS — D49.0 INTRADUCTAL PAPILLARY MUCINOUS NEOPLASM OF PANCREAS: ICD-10-CM

## 2025-09-02 DIAGNOSIS — K86.81 EXOCRINE PANCREATIC INSUFFICIENCY: Primary | ICD-10-CM

## 2025-09-02 DIAGNOSIS — R63.4 WEIGHT LOSS: ICD-10-CM

## 2025-09-02 PROCEDURE — 3008F BODY MASS INDEX DOCD: CPT | Mod: CPTII,S$GLB,, | Performed by: SURGERY

## 2025-09-02 PROCEDURE — 3078F DIAST BP <80 MM HG: CPT | Mod: CPTII,S$GLB,, | Performed by: SURGERY

## 2025-09-02 PROCEDURE — 3044F HG A1C LEVEL LT 7.0%: CPT | Mod: CPTII,S$GLB,, | Performed by: SURGERY

## 2025-09-02 PROCEDURE — 1159F MED LIST DOCD IN RCRD: CPT | Mod: CPTII,S$GLB,, | Performed by: SURGERY

## 2025-09-02 PROCEDURE — 3074F SYST BP LT 130 MM HG: CPT | Mod: CPTII,S$GLB,, | Performed by: SURGERY

## 2025-09-02 PROCEDURE — 99999 PR PBB SHADOW E&M-EST. PATIENT-LVL III: CPT | Mod: PBBFAC,,, | Performed by: SURGERY

## 2025-09-02 PROCEDURE — 99213 OFFICE O/P EST LOW 20 MIN: CPT | Mod: S$GLB,,, | Performed by: SURGERY

## 2025-09-02 RX ORDER — PANCRELIPASE 36000; 180000; 114000 [USP'U]/1; [USP'U]/1; [USP'U]/1
1 CAPSULE, DELAYED RELEASE PELLETS ORAL
Qty: 360 CAPSULE | Refills: 2 | Status: SHIPPED | OUTPATIENT
Start: 2025-09-02

## 2025-09-02 RX ORDER — PANCRELIPASE 36000; 180000; 114000 [USP'U]/1; [USP'U]/1; [USP'U]/1
1 CAPSULE, DELAYED RELEASE PELLETS ORAL
Qty: 360 CAPSULE | Refills: 2 | Status: SHIPPED | OUTPATIENT
Start: 2025-09-02 | End: 2025-09-02

## 2025-09-03 ENCOUNTER — TELEPHONE (OUTPATIENT)
Dept: PHARMACY | Facility: CLINIC | Age: 65
End: 2025-09-03
Payer: COMMERCIAL

## (undated) DEVICE — RELOAD ECHELON FLEX BLU 60MM

## (undated) DEVICE — SUT PROLENE 4-0 RB-1 BL MO

## (undated) DEVICE — DEV-O-LOOPS MINI BLUE

## (undated) DEVICE — TOWEL COTTON SURG WHT 27X17IN

## (undated) DEVICE — TIP GRASPER FENESTRATED DISP

## (undated) DEVICE — SUT 5/0 27IN PDS II VIO MO

## (undated) DEVICE — JELLY SURGILUBE LUBE PKT 3GM

## (undated) DEVICE — RELOAD ECHELON FLEX GRN 60MM

## (undated) DEVICE — CAUTERY TIP 2 3/4

## (undated) DEVICE — TAPE UMBILICAL .32X76.2CM

## (undated) DEVICE — COUNTER BDL BLADEGUARD LI DBL

## (undated) DEVICE — DRAPE ORTH SPLIT 77X108IN

## (undated) DEVICE — NDL BOX COUNTER

## (undated) DEVICE — SUT SILK 2-0 STRANDS 30IN

## (undated) DEVICE — ADHESIVE DERMABOND ADVANCED

## (undated) DEVICE — DRAPE ABDOMINAL TIBURON 14X11

## (undated) DEVICE — SUT PROLENE 3-0 SH DA 36 BL

## (undated) DEVICE — PACK ECLIPSE UNIVERSAL STERILE

## (undated) DEVICE — EVACUATOR WOUND BULB 100CC

## (undated) DEVICE — SUT 1 48IN PDS II VIO MONO

## (undated) DEVICE — BOOT SUTURE AID

## (undated) DEVICE — SUT SILK 3-0 SH 18IN BLACK

## (undated) DEVICE — SUT MONOCRYL 4-0 PS-1 UND

## (undated) DEVICE — GLOVE SIGNATURE ESSNTL LTX 6.5

## (undated) DEVICE — CLIP LIGACLIP XTRA TITANIUM

## (undated) DEVICE — SUT SILK 0 SUTUPAK SA86H

## (undated) DEVICE — CLIP LIGATING TITANIUM SMALL

## (undated) DEVICE — SUT VICRYL 3-0 27 SH

## (undated) DEVICE — GOWN POLY REINF X-LONG XL

## (undated) DEVICE — COVER PROXIMA MAYO STAND

## (undated) DEVICE — NDL ECLIPSE SAFETY 18GX1-1/2IN

## (undated) DEVICE — TRAY CATH 1-LYR URIMTR 16FR

## (undated) DEVICE — SPONGE LAP 18X18 PREWASHED

## (undated) DEVICE — SUT VICRYL PLUS 3-0 SH 18IN

## (undated) DEVICE — DRAPE THREE-QTR REINF 53X77IN

## (undated) DEVICE — CONTAINER SPECIMEN OR STER 4OZ

## (undated) DEVICE — SUT SILK 3-0 STRANDS 30IN

## (undated) DEVICE — SUT PDS RB-2 5-0

## (undated) DEVICE — STAPLER INT PROX TX 60X3.5MM

## (undated) DEVICE — COVER MAYO STND XL 30X57IN

## (undated) DEVICE — SEALER LIGASURE MARYLAND 23CM

## (undated) DEVICE — LOOP VESSEL MAXI RED

## (undated) DEVICE — STAPLER ECHELON STAND 60X340MM

## (undated) DEVICE — GAUZE SPONGE PEANUT STRL

## (undated) DEVICE — LOOP VESSEL BLUE MINI 2/CARD

## (undated) DEVICE — ELECTRODE REM PLYHSV RETURN 9

## (undated) DEVICE — PAD PINK TRENDELENBURG POS XL

## (undated) DEVICE — SUT ETHILON 2-0 BLK PS-2

## (undated) DEVICE — TOWEL OR DISP STRL BLUE 4/PK

## (undated) DEVICE — BLADE EZ CLEAN 2 1/2

## (undated) DEVICE — COVER LIGHT HANDLE 80/CA

## (undated) DEVICE — CLIP LIGATING TITANIUM MD/LRG

## (undated) DEVICE — STAPLER SKIN PROXIMATE WIDE

## (undated) DEVICE — SUT PROLENE 4-0 SH BLU 36IN

## (undated) DEVICE — DRAIN SIL RND HUBLSS 19F TRCR

## (undated) DEVICE — PACK BASIC SETUP SC BR

## (undated) DEVICE — DRAPE CORETEMP FLD WRM 56X62IN

## (undated) DEVICE — SYR 30CC LUER LOCK

## (undated) DEVICE — DRAIN PENROSE STD 18X1/2IN

## (undated) DEVICE — APPLICATOR CHLORAPREP ORN 26ML